# Patient Record
Sex: FEMALE | Race: WHITE | Employment: UNEMPLOYED | URBAN - METROPOLITAN AREA
[De-identification: names, ages, dates, MRNs, and addresses within clinical notes are randomized per-mention and may not be internally consistent; named-entity substitution may affect disease eponyms.]

---

## 2017-01-05 ENCOUNTER — TELEPHONE (OUTPATIENT)
Dept: INTERNAL MEDICINE CLINIC | Age: 57
End: 2017-01-05

## 2017-01-05 DIAGNOSIS — E79.0 HYPERURICEMIA: ICD-10-CM

## 2017-01-05 DIAGNOSIS — Z79.899 ENCOUNTER FOR LONG-TERM (CURRENT) USE OF MEDICATIONS: ICD-10-CM

## 2017-01-05 DIAGNOSIS — I10 ESSENTIAL HYPERTENSION: ICD-10-CM

## 2017-01-05 DIAGNOSIS — Z51.81 ENCOUNTER FOR THERAPEUTIC DRUG MONITORING: ICD-10-CM

## 2017-01-05 DIAGNOSIS — E78.2 MIXED HYPERLIPIDEMIA: Primary | ICD-10-CM

## 2017-01-12 ENCOUNTER — HOSPITAL ENCOUNTER (OUTPATIENT)
Dept: GENERAL RADIOLOGY | Age: 57
Discharge: HOME | End: 2017-01-12
Attending: INTERNAL MEDICINE | Admitting: INTERNAL MEDICINE

## 2017-01-12 LAB
ALBUMIN SERPL-MCNC: 4.8 GM/DL (ref 3.4–5)
ALP BLD-CCNC: 66 IU/L (ref 40–129)
ALT SERPL-CCNC: 26 U/L (ref 10–40)
ANION GAP SERPL CALCULATED.3IONS-SCNC: 14 MMOL/L (ref 4–16)
AST SERPL-CCNC: 22 IU/L (ref 15–37)
BILIRUB SERPL-MCNC: 0.5 MG/DL (ref 0–1)
BILIRUBIN DIRECT: 0.2 MG/DL (ref 0–0.3)
BILIRUBIN, INDIRECT: 0.3 MG/DL (ref 0–0.7)
BUN BLDV-MCNC: 12 MG/DL (ref 6–23)
CALCIUM SERPL-MCNC: 9.8 MG/DL (ref 8.3–10.6)
CHLORIDE BLD-SCNC: 104 MMOL/L (ref 99–110)
CHOLESTEROL: 194 MG/DL
CO2: 26 MMOL/L (ref 21–32)
CREAT SERPL-MCNC: 0.7 MG/DL (ref 0.6–1.1)
GFR AFRICAN AMERICAN: >60 ML/MIN/1.73M2
GFR NON-AFRICAN AMERICAN: >60 ML/MIN/1.73M2
GLUCOSE BLD-MCNC: 106 MG/DL (ref 70–140)
HDLC SERPL-MCNC: 38 MG/DL
LDL CHOLESTEROL CALCULATED: 87 MG/DL
POTASSIUM SERPL-SCNC: 4.4 MMOL/L (ref 3.5–5.1)
SODIUM BLD-SCNC: 144 MMOL/L (ref 135–145)
TOTAL CK: 74 IU/L (ref 26–140)
TOTAL PROTEIN: 7.2 GM/DL (ref 6.4–8.2)
TRIGL SERPL-MCNC: 347 MG/DL
URIC ACID: 6.1 MG/DL (ref 2.6–6)

## 2017-01-19 ENCOUNTER — OFFICE VISIT (OUTPATIENT)
Dept: INTERNAL MEDICINE CLINIC | Age: 57
End: 2017-01-19

## 2017-01-19 VITALS
WEIGHT: 164.2 LBS | DIASTOLIC BLOOD PRESSURE: 80 MMHG | SYSTOLIC BLOOD PRESSURE: 130 MMHG | HEART RATE: 68 BPM | BODY MASS INDEX: 29.79 KG/M2 | RESPIRATION RATE: 14 BRPM

## 2017-01-19 DIAGNOSIS — I10 ESSENTIAL HYPERTENSION: ICD-10-CM

## 2017-01-19 DIAGNOSIS — E79.0 HYPERURICEMIA: ICD-10-CM

## 2017-01-19 DIAGNOSIS — E78.2 MIXED HYPERLIPIDEMIA: ICD-10-CM

## 2017-01-19 DIAGNOSIS — R10.13 EPIGASTRIC PAIN: Primary | ICD-10-CM

## 2017-01-19 PROCEDURE — 99213 OFFICE O/P EST LOW 20 MIN: CPT | Performed by: INTERNAL MEDICINE

## 2017-02-02 ENCOUNTER — TELEPHONE (OUTPATIENT)
Dept: INTERNAL MEDICINE CLINIC | Age: 57
End: 2017-02-02

## 2017-02-02 DIAGNOSIS — M62.838 MUSCLE SPASMS OF NECK: Primary | ICD-10-CM

## 2017-02-03 ENCOUNTER — TELEPHONE (OUTPATIENT)
Dept: INTERNAL MEDICINE CLINIC | Age: 57
End: 2017-02-03

## 2017-02-03 ENCOUNTER — HOSPITAL ENCOUNTER (OUTPATIENT)
Dept: PHYSICAL THERAPY | Age: 57
Discharge: HOME | End: 2017-02-28
Attending: INTERNAL MEDICINE | Admitting: INTERNAL MEDICINE

## 2017-02-03 RX ORDER — TIZANIDINE HYDROCHLORIDE 2 MG/1
2 CAPSULE, GELATIN COATED ORAL 2 TIMES DAILY PRN
Qty: 30 CAPSULE | Refills: 1 | Status: SHIPPED | OUTPATIENT
Start: 2017-02-03 | End: 2017-08-29

## 2017-02-03 ASSESSMENT — PAIN DESCRIPTION - LOCATION: LOCATION: ARM;SHOULDER;NECK

## 2017-02-03 ASSESSMENT — PAIN DESCRIPTION - PROGRESSION: CLINICAL_PROGRESSION: GRADUALLY WORSENING

## 2017-02-03 ASSESSMENT — PAIN DESCRIPTION - PAIN TYPE: TYPE: ACUTE PAIN

## 2017-02-03 ASSESSMENT — PAIN DESCRIPTION - ONSET: ONSET: SUDDEN

## 2017-02-03 ASSESSMENT — PAIN DESCRIPTION - DESCRIPTORS: DESCRIPTORS: ACHING;SHARP

## 2017-02-03 ASSESSMENT — PAIN DESCRIPTION - FREQUENCY: FREQUENCY: CONTINUOUS

## 2017-02-03 ASSESSMENT — PAIN SCALES - GENERAL: PAINLEVEL_OUTOF10: 3

## 2017-02-03 ASSESSMENT — PAIN DESCRIPTION - ORIENTATION: ORIENTATION: LEFT

## 2017-02-06 ENCOUNTER — HOSPITAL ENCOUNTER (OUTPATIENT)
Dept: PHYSICAL THERAPY | Age: 57
Discharge: HOME | End: 2017-02-06
Admitting: INTERNAL MEDICINE

## 2017-02-09 ENCOUNTER — HOSPITAL ENCOUNTER (OUTPATIENT)
Dept: PHYSICAL THERAPY | Age: 57
Discharge: HOME | End: 2017-02-09
Admitting: INTERNAL MEDICINE

## 2017-02-13 ENCOUNTER — HOSPITAL ENCOUNTER (OUTPATIENT)
Dept: GENERAL RADIOLOGY | Age: 57
Discharge: HOME | End: 2017-02-13
Attending: INTERNAL MEDICINE | Admitting: INTERNAL MEDICINE

## 2017-02-13 ENCOUNTER — TELEPHONE (OUTPATIENT)
Dept: INTERNAL MEDICINE CLINIC | Age: 57
End: 2017-02-13

## 2017-02-13 DIAGNOSIS — M54.2 NECK PAIN: ICD-10-CM

## 2017-02-13 DIAGNOSIS — M54.2 NECK PAIN: Primary | ICD-10-CM

## 2017-02-14 ENCOUNTER — TELEPHONE (OUTPATIENT)
Dept: INTERNAL MEDICINE CLINIC | Age: 57
End: 2017-02-14

## 2017-02-14 DIAGNOSIS — M50.30 DDD (DEGENERATIVE DISC DISEASE), CERVICAL: Primary | ICD-10-CM

## 2017-02-18 ENCOUNTER — HOSPITAL ENCOUNTER (OUTPATIENT)
Dept: PHYSICAL THERAPY | Age: 57
Discharge: HOME | End: 2017-02-18
Admitting: INTERNAL MEDICINE

## 2017-03-01 ENCOUNTER — HOSPITAL ENCOUNTER (OUTPATIENT)
Dept: OTHER | Age: 57
Discharge: HOME | End: 2017-03-31
Attending: INTERNAL MEDICINE | Admitting: INTERNAL MEDICINE

## 2017-03-02 ENCOUNTER — HOSPITAL ENCOUNTER (OUTPATIENT)
Dept: PHYSICAL THERAPY | Age: 57
Discharge: HOME | End: 2017-03-02
Admitting: INTERNAL MEDICINE

## 2017-03-16 ENCOUNTER — HOSPITAL ENCOUNTER (OUTPATIENT)
Dept: SURGERY | Age: 57
Discharge: HOME | End: 2017-03-16
Attending: INTERNAL MEDICINE | Admitting: INTERNAL MEDICINE

## 2017-03-16 VITALS
SYSTOLIC BLOOD PRESSURE: 144 MMHG | WEIGHT: 167 LBS | DIASTOLIC BLOOD PRESSURE: 85 MMHG | RESPIRATION RATE: 16 BRPM | HEIGHT: 62 IN | BODY MASS INDEX: 30.73 KG/M2 | TEMPERATURE: 98.7 F | HEART RATE: 66 BPM | OXYGEN SATURATION: 98 %

## 2017-03-16 RX ORDER — SODIUM CHLORIDE, SODIUM LACTATE, POTASSIUM CHLORIDE, CALCIUM CHLORIDE 600; 310; 30; 20 MG/100ML; MG/100ML; MG/100ML; MG/100ML
INJECTION, SOLUTION INTRAVENOUS CONTINUOUS
Status: DISCONTINUED | OUTPATIENT
Start: 2017-03-16 | End: 2017-03-17 | Stop reason: HOSPADM

## 2017-03-16 RX ORDER — ONDANSETRON 2 MG/ML
4 INJECTION INTRAMUSCULAR; INTRAVENOUS ONCE
Status: DISCONTINUED | OUTPATIENT
Start: 2017-03-16 | End: 2017-03-17 | Stop reason: HOSPADM

## 2017-03-16 RX ADMIN — SODIUM CHLORIDE, SODIUM LACTATE, POTASSIUM CHLORIDE, CALCIUM CHLORIDE: 600; 310; 30; 20 INJECTION, SOLUTION INTRAVENOUS at 08:54

## 2017-03-16 ASSESSMENT — PAIN - FUNCTIONAL ASSESSMENT: PAIN_FUNCTIONAL_ASSESSMENT: 0-10

## 2017-03-16 ASSESSMENT — PAIN SCALES - GENERAL
PAINLEVEL_OUTOF10: 0
PAINLEVEL_OUTOF10: 0

## 2017-03-20 ENCOUNTER — HOSPITAL ENCOUNTER (OUTPATIENT)
Dept: PHYSICAL THERAPY | Age: 57
Discharge: HOME | End: 2017-03-20
Admitting: INTERNAL MEDICINE

## 2017-04-01 ENCOUNTER — HOSPITAL ENCOUNTER (OUTPATIENT)
Dept: OTHER | Age: 57
Discharge: HOME | End: 2017-04-24
Attending: INTERNAL MEDICINE | Admitting: INTERNAL MEDICINE

## 2017-04-03 ENCOUNTER — TELEPHONE (OUTPATIENT)
Dept: INTERNAL MEDICINE CLINIC | Age: 57
End: 2017-04-03

## 2017-04-04 ENCOUNTER — OFFICE VISIT (OUTPATIENT)
Dept: INTERNAL MEDICINE CLINIC | Age: 57
End: 2017-04-04

## 2017-04-04 VITALS
WEIGHT: 165.4 LBS | SYSTOLIC BLOOD PRESSURE: 122 MMHG | HEART RATE: 64 BPM | HEIGHT: 63 IN | DIASTOLIC BLOOD PRESSURE: 70 MMHG | RESPIRATION RATE: 12 BRPM | BODY MASS INDEX: 29.3 KG/M2

## 2017-04-04 DIAGNOSIS — E55.9 VITAMIN D DEFICIENCY: ICD-10-CM

## 2017-04-04 DIAGNOSIS — M25.512 CHRONIC LEFT SHOULDER PAIN: ICD-10-CM

## 2017-04-04 DIAGNOSIS — E79.0 HYPERURICEMIA: ICD-10-CM

## 2017-04-04 DIAGNOSIS — R00.2 PALPITATIONS: ICD-10-CM

## 2017-04-04 DIAGNOSIS — G89.29 CHRONIC LEFT SHOULDER PAIN: ICD-10-CM

## 2017-04-04 DIAGNOSIS — Z00.00 ANNUAL PHYSICAL EXAM: Primary | ICD-10-CM

## 2017-04-04 LAB
HEMOCCULT STL QL: NORMAL

## 2017-04-04 PROCEDURE — 81001 URINALYSIS AUTO W/SCOPE: CPT | Performed by: INTERNAL MEDICINE

## 2017-04-04 PROCEDURE — 36415 COLL VENOUS BLD VENIPUNCTURE: CPT | Performed by: INTERNAL MEDICINE

## 2017-04-04 PROCEDURE — 93000 ELECTROCARDIOGRAM COMPLETE: CPT | Performed by: INTERNAL MEDICINE

## 2017-04-04 PROCEDURE — 99396 PREV VISIT EST AGE 40-64: CPT | Performed by: INTERNAL MEDICINE

## 2017-04-04 RX ORDER — HYDROCODONE BITARTRATE AND ACETAMINOPHEN 5; 325 MG/1; MG/1
1 TABLET ORAL 2 TIMES DAILY PRN
Qty: 60 TABLET | Refills: 0 | Status: SHIPPED | OUTPATIENT
Start: 2017-04-04 | End: 2017-11-30 | Stop reason: SDUPTHER

## 2017-04-04 RX ORDER — DILTIAZEM HYDROCHLORIDE 180 MG/1
180 CAPSULE, COATED, EXTENDED RELEASE ORAL DAILY
Qty: 90 CAPSULE | Refills: 3 | Status: SHIPPED | OUTPATIENT
Start: 2017-04-04 | End: 2018-04-18 | Stop reason: SDUPTHER

## 2017-04-05 LAB
A/G RATIO: 1.9 (ref 1.1–2.2)
ALBUMIN SERPL-MCNC: 4.6 G/DL (ref 3.4–5)
ALP BLD-CCNC: 75 U/L (ref 40–129)
ALT SERPL-CCNC: 25 U/L (ref 10–40)
ANION GAP SERPL CALCULATED.3IONS-SCNC: 18 MMOL/L (ref 3–16)
AST SERPL-CCNC: 19 U/L (ref 15–37)
BASOPHILS ABSOLUTE: 0 K/UL (ref 0–0.2)
BASOPHILS RELATIVE PERCENT: 0.3 %
BILIRUB SERPL-MCNC: 0.5 MG/DL (ref 0–1)
BILIRUBIN URINE: NEGATIVE
BLOOD, URINE: NEGATIVE
BUN BLDV-MCNC: 10 MG/DL (ref 7–20)
CALCIUM SERPL-MCNC: 9.8 MG/DL (ref 8.3–10.6)
CHLORIDE BLD-SCNC: 104 MMOL/L (ref 99–110)
CHOLESTEROL, TOTAL: 203 MG/DL (ref 0–199)
CLARITY: ABNORMAL
CO2: 23 MMOL/L (ref 21–32)
COLOR: YELLOW
CREAT SERPL-MCNC: 0.6 MG/DL (ref 0.6–1.1)
EOSINOPHILS ABSOLUTE: 0.1 K/UL (ref 0–0.6)
EOSINOPHILS RELATIVE PERCENT: 1.6 %
EPITHELIAL CELLS, UA: 1 /HPF (ref 0–5)
ESTIMATED AVERAGE GLUCOSE: 111.2 MG/DL
GFR AFRICAN AMERICAN: >60
GFR NON-AFRICAN AMERICAN: >60
GLOBULIN: 2.4 G/DL
GLUCOSE BLD-MCNC: 106 MG/DL (ref 70–99)
GLUCOSE URINE: NEGATIVE MG/DL
HBA1C MFR BLD: 5.5 %
HCT VFR BLD CALC: 44.1 % (ref 36–48)
HDLC SERPL-MCNC: 36 MG/DL (ref 40–60)
HEMOGLOBIN: 14.5 G/DL (ref 12–16)
HYALINE CASTS: 0 /HPF (ref 0–8)
KETONES, URINE: NEGATIVE MG/DL
LDL CHOLESTEROL CALCULATED: ABNORMAL MG/DL
LDL CHOLESTEROL DIRECT: 103 MG/DL
LEUKOCYTE ESTERASE, URINE: NEGATIVE
LYMPHOCYTES ABSOLUTE: 2.8 K/UL (ref 1–5.1)
LYMPHOCYTES RELATIVE PERCENT: 34.7 %
MCH RBC QN AUTO: 28 PG (ref 26–34)
MCHC RBC AUTO-ENTMCNC: 32.9 G/DL (ref 31–36)
MCV RBC AUTO: 85.2 FL (ref 80–100)
MICROSCOPIC EXAMINATION: YES
MONOCYTES ABSOLUTE: 0.6 K/UL (ref 0–1.3)
MONOCYTES RELATIVE PERCENT: 7.7 %
NEUTROPHILS ABSOLUTE: 4.5 K/UL (ref 1.7–7.7)
NEUTROPHILS RELATIVE PERCENT: 55.7 %
NITRITE, URINE: NEGATIVE
PDW BLD-RTO: 14.1 % (ref 12.4–15.4)
PH UA: 7.5
PLATELET # BLD: 264 K/UL (ref 135–450)
PMV BLD AUTO: 9.2 FL (ref 5–10.5)
POTASSIUM SERPL-SCNC: 4.1 MMOL/L (ref 3.5–5.1)
PROTEIN UA: ABNORMAL MG/DL
RBC # BLD: 5.18 M/UL (ref 4–5.2)
RBC UA: 2 /HPF (ref 0–4)
SODIUM BLD-SCNC: 145 MMOL/L (ref 136–145)
SPECIFIC GRAVITY UA: 1.02
TOTAL CK: 43 U/L (ref 26–192)
TOTAL PROTEIN: 7 G/DL (ref 6.4–8.2)
TRIGL SERPL-MCNC: 444 MG/DL (ref 0–150)
TSH SERPL DL<=0.05 MIU/L-ACNC: 1.02 UIU/ML (ref 0.27–4.2)
URIC ACID, SERUM: 8.1 MG/DL (ref 2.6–6)
UROBILINOGEN, URINE: 0.2 E.U./DL
VITAMIN D 25-HYDROXY: 28.9 NG/ML
VLDLC SERPL CALC-MCNC: ABNORMAL MG/DL
WBC # BLD: 8.1 K/UL (ref 4–11)
WBC UA: 2 /HPF (ref 0–5)

## 2017-04-06 ENCOUNTER — HOSPITAL ENCOUNTER (OUTPATIENT)
Dept: GENERAL RADIOLOGY | Age: 57
Discharge: HOME | End: 2017-04-06
Attending: INTERNAL MEDICINE | Admitting: INTERNAL MEDICINE

## 2017-04-06 DIAGNOSIS — M25.512 CHRONIC LEFT SHOULDER PAIN: ICD-10-CM

## 2017-04-06 DIAGNOSIS — G89.29 CHRONIC LEFT SHOULDER PAIN: ICD-10-CM

## 2017-04-17 ENCOUNTER — TELEPHONE (OUTPATIENT)
Dept: INTERNAL MEDICINE CLINIC | Age: 57
End: 2017-04-17

## 2017-04-18 ENCOUNTER — OFFICE VISIT (OUTPATIENT)
Dept: INTERNAL MEDICINE CLINIC | Age: 57
End: 2017-04-18

## 2017-04-18 DIAGNOSIS — E78.2 MIXED HYPERLIPIDEMIA: ICD-10-CM

## 2017-04-18 DIAGNOSIS — M79.7 FIBROMYALGIA: ICD-10-CM

## 2017-04-18 DIAGNOSIS — E79.0 HYPERURICEMIA: Primary | ICD-10-CM

## 2017-04-18 DIAGNOSIS — F41.1 GENERALIZED ANXIETY DISORDER: ICD-10-CM

## 2017-04-18 DIAGNOSIS — I10 ESSENTIAL HYPERTENSION: ICD-10-CM

## 2017-04-18 PROCEDURE — 99213 OFFICE O/P EST LOW 20 MIN: CPT | Performed by: INTERNAL MEDICINE

## 2017-05-03 VITALS — HEART RATE: 72 BPM | DIASTOLIC BLOOD PRESSURE: 80 MMHG | SYSTOLIC BLOOD PRESSURE: 130 MMHG

## 2017-05-23 ENCOUNTER — OFFICE VISIT (OUTPATIENT)
Dept: FAMILY MEDICINE CLINIC | Age: 57
End: 2017-05-23

## 2017-05-23 ENCOUNTER — TELEPHONE (OUTPATIENT)
Dept: FAMILY MEDICINE CLINIC | Age: 57
End: 2017-05-23

## 2017-05-23 VITALS
HEART RATE: 85 BPM | WEIGHT: 169.6 LBS | SYSTOLIC BLOOD PRESSURE: 138 MMHG | BODY MASS INDEX: 31.21 KG/M2 | HEIGHT: 62 IN | OXYGEN SATURATION: 98 % | TEMPERATURE: 97.9 F | DIASTOLIC BLOOD PRESSURE: 86 MMHG

## 2017-05-23 DIAGNOSIS — J01.00 ACUTE MAXILLARY SINUSITIS, RECURRENCE NOT SPECIFIED: Primary | ICD-10-CM

## 2017-05-23 DIAGNOSIS — H00.014 HORDEOLUM EXTERNUM OF LEFT UPPER EYELID: ICD-10-CM

## 2017-05-23 DIAGNOSIS — H10.32 ACUTE BACTERIAL CONJUNCTIVITIS OF LEFT EYE: ICD-10-CM

## 2017-05-23 PROCEDURE — 99214 OFFICE O/P EST MOD 30 MIN: CPT | Performed by: FAMILY MEDICINE

## 2017-05-23 RX ORDER — AMOXICILLIN AND CLAVULANATE POTASSIUM 500; 125 MG/1; MG/1
1 TABLET, FILM COATED ORAL 2 TIMES DAILY
Qty: 14 TABLET | Refills: 0 | Status: SHIPPED | OUTPATIENT
Start: 2017-05-23 | End: 2017-05-30

## 2017-05-23 RX ORDER — FLUCONAZOLE 150 MG/1
TABLET ORAL
Qty: 2 TABLET | Refills: 0 | Status: SHIPPED | OUTPATIENT
Start: 2017-05-23 | End: 2017-08-29 | Stop reason: ALTCHOICE

## 2017-05-23 RX ORDER — MOXIFLOXACIN 5 MG/ML
1 SOLUTION/ DROPS OPHTHALMIC 3 TIMES DAILY
Qty: 3 ML | Refills: 0 | Status: SHIPPED | OUTPATIENT
Start: 2017-05-23 | End: 2017-05-30

## 2017-05-23 ASSESSMENT — ENCOUNTER SYMPTOMS
SORE THROAT: 0
SHORTNESS OF BREATH: 0
NAUSEA: 0
SINUS PRESSURE: 0
EYE REDNESS: 1
COUGH: 0
DOUBLE VISION: 0
FOREIGN BODY SENSATION: 1
EYE ITCHING: 1
DIARRHEA: 0
BLURRED VISION: 1
BACK PAIN: 0
VOMITING: 0
EYE DISCHARGE: 1

## 2017-05-23 ASSESSMENT — PATIENT HEALTH QUESTIONNAIRE - PHQ9
2. FEELING DOWN, DEPRESSED OR HOPELESS: 0
SUM OF ALL RESPONSES TO PHQ QUESTIONS 1-9: 0
1. LITTLE INTEREST OR PLEASURE IN DOING THINGS: 0
SUM OF ALL RESPONSES TO PHQ9 QUESTIONS 1 & 2: 0

## 2017-05-23 ASSESSMENT — VISUAL ACUITY
OD_CC: 20/25
OS_CC: 20/25

## 2017-05-25 ENCOUNTER — TELEPHONE (OUTPATIENT)
Dept: FAMILY MEDICINE CLINIC | Age: 57
End: 2017-05-25

## 2017-07-06 ENCOUNTER — TELEPHONE (OUTPATIENT)
Dept: INTERNAL MEDICINE CLINIC | Age: 57
End: 2017-07-06

## 2017-07-06 DIAGNOSIS — E78.2 MIXED HYPERLIPIDEMIA: Primary | ICD-10-CM

## 2017-07-06 DIAGNOSIS — I10 ESSENTIAL HYPERTENSION: ICD-10-CM

## 2017-07-06 DIAGNOSIS — E79.0 HYPERURICEMIA: ICD-10-CM

## 2017-07-06 DIAGNOSIS — R73.02 GLUCOSE INTOLERANCE (IMPAIRED GLUCOSE TOLERANCE): ICD-10-CM

## 2017-07-06 DIAGNOSIS — Z79.899 ENCOUNTER FOR LONG-TERM (CURRENT) USE OF MEDICATIONS: ICD-10-CM

## 2017-07-06 DIAGNOSIS — Z51.81 ENCOUNTER FOR THERAPEUTIC DRUG MONITORING: ICD-10-CM

## 2017-07-10 ENCOUNTER — NURSE ONLY (OUTPATIENT)
Dept: INTERNAL MEDICINE CLINIC | Age: 57
End: 2017-07-10

## 2017-07-10 DIAGNOSIS — E78.2 MIXED HYPERLIPIDEMIA: ICD-10-CM

## 2017-07-10 DIAGNOSIS — I10 ESSENTIAL HYPERTENSION: ICD-10-CM

## 2017-07-10 LAB
ALBUMIN SERPL-MCNC: 4.8 G/DL (ref 3.4–5)
ALP BLD-CCNC: 74 U/L (ref 40–129)
ALT SERPL-CCNC: 27 U/L (ref 10–40)
ANION GAP SERPL CALCULATED.3IONS-SCNC: 19 MMOL/L (ref 3–16)
AST SERPL-CCNC: 22 U/L (ref 15–37)
BILIRUB SERPL-MCNC: 0.5 MG/DL (ref 0–1)
BILIRUBIN DIRECT: <0.2 MG/DL (ref 0–0.3)
BILIRUBIN, INDIRECT: NORMAL MG/DL (ref 0–1)
BUN BLDV-MCNC: 12 MG/DL (ref 7–20)
CALCIUM SERPL-MCNC: 9.9 MG/DL (ref 8.3–10.6)
CHLORIDE BLD-SCNC: 103 MMOL/L (ref 99–110)
CHOLESTEROL, TOTAL: 193 MG/DL (ref 0–199)
CO2: 22 MMOL/L (ref 21–32)
CREAT SERPL-MCNC: 0.6 MG/DL (ref 0.6–1.1)
GFR AFRICAN AMERICAN: >60
GFR NON-AFRICAN AMERICAN: >60
GLUCOSE BLD-MCNC: 89 MG/DL (ref 70–99)
HDLC SERPL-MCNC: 34 MG/DL (ref 40–60)
LDL CHOLESTEROL CALCULATED: ABNORMAL MG/DL
LDL CHOLESTEROL DIRECT: 102 MG/DL
POTASSIUM SERPL-SCNC: 4.2 MMOL/L (ref 3.5–5.1)
SODIUM BLD-SCNC: 144 MMOL/L (ref 136–145)
TOTAL PROTEIN: 7.7 G/DL (ref 6.4–8.2)
TRIGL SERPL-MCNC: 422 MG/DL (ref 0–150)
URIC ACID, SERUM: 7.4 MG/DL (ref 2.6–6)
VLDLC SERPL CALC-MCNC: ABNORMAL MG/DL

## 2017-07-10 PROCEDURE — 36415 COLL VENOUS BLD VENIPUNCTURE: CPT | Performed by: INTERNAL MEDICINE

## 2017-07-11 LAB
ESTIMATED AVERAGE GLUCOSE: 116.9 MG/DL
HBA1C MFR BLD: 5.7 %

## 2017-07-17 ENCOUNTER — TELEPHONE (OUTPATIENT)
Dept: INTERNAL MEDICINE CLINIC | Age: 57
End: 2017-07-17

## 2017-07-18 ENCOUNTER — OFFICE VISIT (OUTPATIENT)
Dept: INTERNAL MEDICINE CLINIC | Age: 57
End: 2017-07-18

## 2017-07-18 VITALS
DIASTOLIC BLOOD PRESSURE: 74 MMHG | BODY MASS INDEX: 31.09 KG/M2 | HEART RATE: 68 BPM | RESPIRATION RATE: 12 BRPM | SYSTOLIC BLOOD PRESSURE: 130 MMHG | WEIGHT: 170 LBS

## 2017-07-18 DIAGNOSIS — E78.2 MIXED HYPERLIPIDEMIA: ICD-10-CM

## 2017-07-18 DIAGNOSIS — R73.02 GLUCOSE INTOLERANCE (IMPAIRED GLUCOSE TOLERANCE): ICD-10-CM

## 2017-07-18 DIAGNOSIS — E79.0 HYPERURICEMIA: ICD-10-CM

## 2017-07-18 DIAGNOSIS — I10 ESSENTIAL HYPERTENSION: Primary | ICD-10-CM

## 2017-07-18 PROCEDURE — 99213 OFFICE O/P EST LOW 20 MIN: CPT | Performed by: INTERNAL MEDICINE

## 2017-08-29 ENCOUNTER — OFFICE VISIT (OUTPATIENT)
Dept: FAMILY MEDICINE CLINIC | Age: 57
End: 2017-08-29

## 2017-08-29 VITALS
SYSTOLIC BLOOD PRESSURE: 144 MMHG | HEART RATE: 92 BPM | HEIGHT: 62 IN | WEIGHT: 171.2 LBS | TEMPERATURE: 98.3 F | BODY MASS INDEX: 31.5 KG/M2 | OXYGEN SATURATION: 98 % | DIASTOLIC BLOOD PRESSURE: 96 MMHG

## 2017-08-29 DIAGNOSIS — J01.01 ACUTE RECURRENT MAXILLARY SINUSITIS: Primary | ICD-10-CM

## 2017-08-29 DIAGNOSIS — I10 ESSENTIAL HYPERTENSION: ICD-10-CM

## 2017-08-29 PROCEDURE — 99213 OFFICE O/P EST LOW 20 MIN: CPT | Performed by: FAMILY MEDICINE

## 2017-08-29 RX ORDER — ALPRAZOLAM 0.25 MG/1
0.25 TABLET ORAL PRN
COMMUNITY
End: 2019-04-23 | Stop reason: CLARIF

## 2017-08-29 RX ORDER — AZITHROMYCIN 250 MG/1
TABLET, FILM COATED ORAL
Qty: 1 PACKET | Refills: 0 | Status: SHIPPED | OUTPATIENT
Start: 2017-08-29 | End: 2017-09-15

## 2017-08-29 RX ORDER — ASPIRIN 325 MG
325 TABLET ORAL PRN
COMMUNITY
End: 2018-04-18 | Stop reason: CLARIF

## 2017-08-29 ASSESSMENT — ENCOUNTER SYMPTOMS
VOMITING: 0
COUGH: 0
BACK PAIN: 0
SINUS PRESSURE: 0
DIARRHEA: 0
NAUSEA: 0
SHORTNESS OF BREATH: 0
SORE THROAT: 0
EYE DISCHARGE: 0
ABDOMINAL PAIN: 0
BLOOD IN STOOL: 0

## 2017-08-31 ENCOUNTER — TELEPHONE (OUTPATIENT)
Dept: FAMILY MEDICINE CLINIC | Age: 57
End: 2017-08-31

## 2017-08-31 ENCOUNTER — OFFICE VISIT (OUTPATIENT)
Dept: INTERNAL MEDICINE CLINIC | Age: 57
End: 2017-08-31

## 2017-08-31 VITALS
BODY MASS INDEX: 30.99 KG/M2 | HEART RATE: 88 BPM | DIASTOLIC BLOOD PRESSURE: 82 MMHG | WEIGHT: 170.8 LBS | RESPIRATION RATE: 16 BRPM | SYSTOLIC BLOOD PRESSURE: 142 MMHG | TEMPERATURE: 98.3 F

## 2017-08-31 DIAGNOSIS — H66.001 ACUTE SUPPURATIVE OTITIS MEDIA OF RIGHT EAR WITHOUT SPONTANEOUS RUPTURE OF TYMPANIC MEMBRANE, RECURRENCE NOT SPECIFIED: Primary | ICD-10-CM

## 2017-08-31 DIAGNOSIS — R59.0 LYMPHADENOPATHY OF RIGHT CERVICAL REGION: ICD-10-CM

## 2017-08-31 DIAGNOSIS — B00.9 HERPETIC LESION: ICD-10-CM

## 2017-08-31 PROCEDURE — 99213 OFFICE O/P EST LOW 20 MIN: CPT | Performed by: INTERNAL MEDICINE

## 2017-08-31 RX ORDER — AMOXICILLIN AND CLAVULANATE POTASSIUM 875; 125 MG/1; MG/1
1 TABLET, FILM COATED ORAL 2 TIMES DAILY
Qty: 20 TABLET | Refills: 0 | Status: SHIPPED | OUTPATIENT
Start: 2017-08-31 | End: 2017-09-10

## 2017-08-31 RX ORDER — FAMCICLOVIR 500 MG/1
TABLET, FILM COATED ORAL
Qty: 6 TABLET | Refills: 2 | Status: SHIPPED | OUTPATIENT
Start: 2017-08-31 | End: 2019-12-17 | Stop reason: SDUPTHER

## 2017-08-31 RX ORDER — METHYLPREDNISOLONE 4 MG/1
TABLET ORAL
Qty: 1 KIT | Refills: 0 | Status: SHIPPED | OUTPATIENT
Start: 2017-08-31 | End: 2017-09-06

## 2017-08-31 RX ORDER — FLUCONAZOLE 150 MG/1
150 TABLET ORAL ONCE
Qty: 1 TABLET | Refills: 0 | Status: SHIPPED | OUTPATIENT
Start: 2017-08-31 | End: 2017-08-31

## 2017-09-15 ENCOUNTER — OFFICE VISIT (OUTPATIENT)
Dept: INTERNAL MEDICINE CLINIC | Age: 57
End: 2017-09-15

## 2017-09-15 VITALS
OXYGEN SATURATION: 96 % | DIASTOLIC BLOOD PRESSURE: 80 MMHG | RESPIRATION RATE: 14 BRPM | SYSTOLIC BLOOD PRESSURE: 130 MMHG | HEART RATE: 90 BPM

## 2017-09-15 DIAGNOSIS — H91.91 HEARING LOSS OF RIGHT EAR, UNSPECIFIED HEARING LOSS TYPE: ICD-10-CM

## 2017-09-15 DIAGNOSIS — I10 ESSENTIAL HYPERTENSION: ICD-10-CM

## 2017-09-15 DIAGNOSIS — J32.0 CHRONIC MAXILLARY SINUSITIS: ICD-10-CM

## 2017-09-15 DIAGNOSIS — H65.31 CHRONIC MUCOID OTITIS MEDIA OF RIGHT EAR: Primary | ICD-10-CM

## 2017-09-15 PROCEDURE — 99213 OFFICE O/P EST LOW 20 MIN: CPT | Performed by: INTERNAL MEDICINE

## 2017-09-25 ENCOUNTER — OFFICE VISIT (OUTPATIENT)
Dept: SURGERY | Age: 57
End: 2017-09-25

## 2017-09-25 VITALS — HEIGHT: 62 IN | BODY MASS INDEX: 30.55 KG/M2 | WEIGHT: 166 LBS

## 2017-09-25 DIAGNOSIS — L68.0 HIRSUTISM: Primary | ICD-10-CM

## 2017-09-25 PROCEDURE — 99999 PR OFFICE/OUTPT VISIT,PROCEDURE ONLY: CPT | Performed by: SURGERY

## 2017-09-25 ASSESSMENT — ENCOUNTER SYMPTOMS
CHOKING: 0
BACK PAIN: 0
ANAL BLEEDING: 0
STRIDOR: 0
CONSTIPATION: 0
PHOTOPHOBIA: 0
APNEA: 0
EYE ITCHING: 0
RECTAL PAIN: 0
SORE THROAT: 0
COLOR CHANGE: 0
EYE REDNESS: 0

## 2017-10-12 ENCOUNTER — PROCEDURE VISIT (OUTPATIENT)
Dept: SURGERY | Age: 57
End: 2017-10-12

## 2017-10-12 DIAGNOSIS — L68.0 HIRSUTISM: Primary | ICD-10-CM

## 2017-10-12 PROCEDURE — 99024 POSTOP FOLLOW-UP VISIT: CPT | Performed by: SURGERY

## 2017-10-26 ENCOUNTER — TELEPHONE (OUTPATIENT)
Dept: INTERNAL MEDICINE CLINIC | Age: 57
End: 2017-10-26

## 2017-10-26 DIAGNOSIS — R73.02 GLUCOSE INTOLERANCE (IMPAIRED GLUCOSE TOLERANCE): ICD-10-CM

## 2017-10-26 DIAGNOSIS — M1A.9XX1 CHRONIC GOUT WITH TOPHUS, UNSPECIFIED CAUSE, UNSPECIFIED SITE: Primary | ICD-10-CM

## 2017-10-26 DIAGNOSIS — I10 ESSENTIAL HYPERTENSION: ICD-10-CM

## 2017-10-26 DIAGNOSIS — E78.2 MIXED HYPERLIPIDEMIA: ICD-10-CM

## 2017-10-26 DIAGNOSIS — E79.0 HYPERURICEMIA: ICD-10-CM

## 2017-11-01 ENCOUNTER — NURSE ONLY (OUTPATIENT)
Dept: INTERNAL MEDICINE CLINIC | Age: 57
End: 2017-11-01

## 2017-11-01 DIAGNOSIS — Z23 NEED FOR INFLUENZA VACCINATION: Primary | ICD-10-CM

## 2017-11-01 PROCEDURE — 90686 IIV4 VACC NO PRSV 0.5 ML IM: CPT | Performed by: INTERNAL MEDICINE

## 2017-11-01 PROCEDURE — 90471 IMMUNIZATION ADMIN: CPT | Performed by: INTERNAL MEDICINE

## 2017-11-01 NOTE — PROGRESS NOTES
Vaccine Information Sheet, \"Influenza - Inactivated\"  given to Yonis Sanchez, or parent/legal guardian of  Yonis Sanchez and verbalized understanding. Patient responses:    Have you ever had a reaction to a flu vaccine? NO  Are you able to eat eggs without adverse effects? YES  Do you have any current illness? NO  Have you ever had Guillian Joffre Syndrome? NO    Flu vaccine given per order. Please see immunization tab.

## 2017-11-03 LAB
ANION GAP SERPL CALCULATED.3IONS-SCNC: 17 MMOL/L (ref 3–16)
BASOPHILS ABSOLUTE: 0 K/UL (ref 0–0.2)
BASOPHILS RELATIVE PERCENT: 0.3 %
BUN BLDV-MCNC: 9 MG/DL (ref 7–20)
CALCIUM SERPL-MCNC: 9.8 MG/DL (ref 8.3–10.6)
CHLORIDE BLD-SCNC: 101 MMOL/L (ref 99–110)
CHOLESTEROL, TOTAL: 179 MG/DL (ref 0–199)
CO2: 25 MMOL/L (ref 21–32)
CREAT SERPL-MCNC: 0.6 MG/DL (ref 0.6–1.1)
EOSINOPHILS ABSOLUTE: 0.1 K/UL (ref 0–0.6)
EOSINOPHILS RELATIVE PERCENT: 1.8 %
GFR AFRICAN AMERICAN: >60
GFR NON-AFRICAN AMERICAN: >60
GLUCOSE BLD-MCNC: 81 MG/DL (ref 70–99)
HCT VFR BLD CALC: 42.4 % (ref 36–48)
HDLC SERPL-MCNC: 31 MG/DL (ref 40–60)
HEMOGLOBIN: 14.1 G/DL (ref 12–16)
LDL CHOLESTEROL CALCULATED: ABNORMAL MG/DL
LDL CHOLESTEROL DIRECT: 83 MG/DL
LYMPHOCYTES ABSOLUTE: 2.5 K/UL (ref 1–5.1)
LYMPHOCYTES RELATIVE PERCENT: 33.7 %
MCH RBC QN AUTO: 28.3 PG (ref 26–34)
MCHC RBC AUTO-ENTMCNC: 33.3 G/DL (ref 31–36)
MCV RBC AUTO: 84.9 FL (ref 80–100)
MONOCYTES ABSOLUTE: 0.6 K/UL (ref 0–1.3)
MONOCYTES RELATIVE PERCENT: 7.9 %
NEUTROPHILS ABSOLUTE: 4.2 K/UL (ref 1.7–7.7)
NEUTROPHILS RELATIVE PERCENT: 56.3 %
PDW BLD-RTO: 14.7 % (ref 12.4–15.4)
PLATELET # BLD: 277 K/UL (ref 135–450)
PMV BLD AUTO: 8.5 FL (ref 5–10.5)
POTASSIUM SERPL-SCNC: 4.3 MMOL/L (ref 3.5–5.1)
RBC # BLD: 5 M/UL (ref 4–5.2)
SODIUM BLD-SCNC: 143 MMOL/L (ref 136–145)
TRIGL SERPL-MCNC: 435 MG/DL (ref 0–150)
TSH SERPL DL<=0.05 MIU/L-ACNC: 1.96 UIU/ML (ref 0.27–4.2)
URIC ACID, SERUM: 6.4 MG/DL (ref 2.6–6)
VLDLC SERPL CALC-MCNC: ABNORMAL MG/DL
WBC # BLD: 7.5 K/UL (ref 4–11)

## 2017-11-04 LAB
ESTIMATED AVERAGE GLUCOSE: 116.9 MG/DL
HBA1C MFR BLD: 5.7 %

## 2017-11-09 ENCOUNTER — PROCEDURE VISIT (OUTPATIENT)
Dept: SURGERY | Age: 57
End: 2017-11-09

## 2017-11-09 VITALS — BODY MASS INDEX: 30.55 KG/M2 | HEIGHT: 62 IN | WEIGHT: 166.01 LBS

## 2017-11-09 DIAGNOSIS — L68.0 HIRSUTISM: Primary | ICD-10-CM

## 2017-11-09 PROCEDURE — 99999 PR OFFICE/OUTPT VISIT,PROCEDURE ONLY: CPT | Performed by: SURGERY

## 2017-11-30 NOTE — TELEPHONE ENCOUNTER
From: Marcos Villa  Sent: 11/30/2017 1:54 PM EST  Subject: Medication Renewal Request    Pola Boards A. Quinton Lesches would like a refill of the following medications:  omeprazole (PRILOSEC) 20 MG capsule [MARIA TERESA Jesus MD]  Icosapent Ethyl (VASCEPA) 1 G CAPS capsule [MARIA TERESA Jesus MD]  HYDROcodone-acetaminophen (NORCO) 5-325 MG per tablet [MARIA TERESA Jesus MD]    Preferred pharmacy: [address] [place] [address], [address], [place]. \[facility].\""e.  [facility] [phone] - F [phone]    Comment:

## 2017-12-01 RX ORDER — OMEPRAZOLE 20 MG/1
20 CAPSULE, DELAYED RELEASE ORAL DAILY
Qty: 90 CAPSULE | Refills: 3 | Status: SHIPPED | OUTPATIENT
Start: 2017-12-01 | End: 2019-12-09 | Stop reason: SDUPTHER

## 2017-12-01 RX ORDER — HYDROCODONE BITARTRATE AND ACETAMINOPHEN 5; 325 MG/1; MG/1
1 TABLET ORAL 2 TIMES DAILY PRN
Qty: 60 TABLET | Refills: 0 | Status: SHIPPED | OUTPATIENT
Start: 2017-12-01 | End: 2018-02-08 | Stop reason: SDUPTHER

## 2017-12-01 RX ORDER — ICOSAPENT ETHYL 1000 MG/1
1 CAPSULE ORAL DAILY
Qty: 30 CAPSULE | Refills: 5 | Status: SHIPPED | OUTPATIENT
Start: 2017-12-01 | End: 2018-04-18 | Stop reason: CLARIF

## 2017-12-07 ENCOUNTER — PROCEDURE VISIT (OUTPATIENT)
Dept: SURGERY | Age: 57
End: 2017-12-07

## 2017-12-07 VITALS — BODY MASS INDEX: 30.55 KG/M2 | HEIGHT: 62 IN | WEIGHT: 166.01 LBS

## 2017-12-07 DIAGNOSIS — L68.0 HIRSUTISM: Primary | ICD-10-CM

## 2017-12-07 PROCEDURE — 99999 PR OFFICE/OUTPT VISIT,PROCEDURE ONLY: CPT | Performed by: SURGERY

## 2017-12-07 ASSESSMENT — ENCOUNTER SYMPTOMS
EYE ITCHING: 0
PHOTOPHOBIA: 0
BACK PAIN: 0
RECTAL PAIN: 0
SORE THROAT: 0
COLOR CHANGE: 0
CONSTIPATION: 0
APNEA: 0
STRIDOR: 0
ANAL BLEEDING: 0
EYE REDNESS: 0
CHOKING: 0

## 2017-12-08 NOTE — PROGRESS NOTES
Chief Complaint   Patient presents with    Hirsutism     3 rd laser TX face/ neck          SUBJECTIVE:  HPI: Patient is here with complaints of Facial hair. Thoroughly reviewed the patient's medical history, family history, social history and review of systems with the patient today in the office. Please see medical record for pertinent positives. Past Medical History:   Diagnosis Date    Fatty liver     by US    Fibromyalgia     GERD (gastroesophageal reflux disease)     by UGI    Gout     Hyperlipidemia     Hypertension     Hypoglycemia     IBS (irritable bowel syndrome)     Prolonged emergence from general anesthesia     PSVT (paroxysmal supraventricular tachycardia) (HCC)     s/p ablation      Past Surgical History:   Procedure Laterality Date    ABDOMEN SURGERY       Cholecystectomy    ABDOMEN SURGERY      Rt ovarian adhesions removed and cervical laser therapy/Dr Karrie Paul ABDOMEN SURGERY      Rt ovarian cyst removed    ABLATION OF DYSRHYTHMIC FOCUS      APPENDECTOMY      BONE CYST EXCISION  1980    Left bone cyst aneurysm removed    CHOLECYSTECTOMY      COLONOSCOPY  1/19/2016    diverticulosis    FOOT NEUROMA SURGERY Right     TONSILLECTOMY       Current Outpatient Prescriptions   Medication Sig Dispense Refill    omeprazole (PRILOSEC) 20 MG delayed release capsule Take 1 capsule by mouth Daily 90 capsule 3    Icosapent Ethyl (VASCEPA) 1 g CAPS capsule Take 1 capsule by mouth daily 30 capsule 5    HYDROcodone-acetaminophen (NORCO) 5-325 MG per tablet Take 1 tablet by mouth 2 times daily as needed for Pain .  Earliest Fill Date: 12/1/17 60 tablet 0    famciclovir (FAMVIR) 500 MG tablet Take 3 tablets as a single dose as directed 6 tablet 2    aspirin 325 MG tablet Take 325 mg by mouth as needed for Pain      ALPRAZolam (XANAX) 0.25 MG tablet Take 0.25 mg by mouth as needed for Anxiety      choline fenofibrate (TRILIPIX) 135 MG CPDR delayed release capsule TAKE ONE CAPSULE swelling and myalgias. Skin: Negative for color change and pallor. Allergic/Immunologic: Negative for environmental allergies. Neurological: Negative for syncope and speech difficulty. Psychiatric/Behavioral: Negative for confusion and hallucinations. OBJECTIVE:  Physical Exam:    Ht 5' 2.01\" (1.575 m)   Wt 166 lb 0.1 oz (75.3 kg)   BMI 30.36 kg/m²      Physical Exam   Constitutional: She is oriented to person, place, and time. She appears well-developed and well-nourished. HENT:   Head: Normocephalic. Eyes: Pupils are equal, round, and reactive to light. Neck: Normal range of motion. Neck supple. Cardiovascular: Normal rate. Pulmonary/Chest: Effort normal.   Abdominal: Soft. She exhibits no distension and no mass. There is no tenderness. There is no rebound and no guarding. Musculoskeletal: Normal range of motion. Neurological: She is alert and oriented to person, place, and time. Skin: Skin is warm. Psychiatric: She has a normal mood and affect. ASSESSMENT:  1. Hirsutism          PLAN:  Treatment:  Patient completed laser hair removal using the 755 laser head. Patient counseled on risks, benefits, and alternatives of treatment plan at length today. Patient states an understanding and willingness to proceed with plan. No orders of the defined types were placed in this encounter. No orders of the defined types were placed in this encounter. Follow Up:  Return if symptoms worsen or fail to improve.       Ty Hernandez MD

## 2017-12-10 ASSESSMENT — ENCOUNTER SYMPTOMS
SORE THROAT: 0
ANAL BLEEDING: 0
PHOTOPHOBIA: 0
STRIDOR: 0
CHOKING: 0
CONSTIPATION: 0
EYE REDNESS: 0
COLOR CHANGE: 0
EYE ITCHING: 0
RECTAL PAIN: 0
BACK PAIN: 0
APNEA: 0

## 2017-12-11 NOTE — PROGRESS NOTES
Chief Complaint   Patient presents with    Hirsutism     c/c-hair growth-laser-2nd  Laser Procedure Cheeks, upper lip, Chin Neck         SUBJECTIVE:  HPI: Patient is here with complaints of Facial hair. She is here to have 2nd laser hair therapy. Thoroughly reviewed the patient's medical history, family history, social history and review of systems with the patient today in the office. Please see medical record for pertinent positives. Past Medical History:   Diagnosis Date    Fatty liver     by US    Fibromyalgia     GERD (gastroesophageal reflux disease)     by UGI    Gout     Hyperlipidemia     Hypertension     Hypoglycemia     IBS (irritable bowel syndrome)     Prolonged emergence from general anesthesia     PSVT (paroxysmal supraventricular tachycardia) (HCC)     s/p ablation      Past Surgical History:   Procedure Laterality Date    ABDOMEN SURGERY       Cholecystectomy    ABDOMEN SURGERY      Rt ovarian adhesions removed and cervical laser therapy/Dr Royer Ignacio ABDOMEN SURGERY      Rt ovarian cyst removed    ABLATION OF DYSRHYTHMIC FOCUS      APPENDECTOMY      BONE CYST EXCISION  1980    Left bone cyst aneurysm removed    CHOLECYSTECTOMY      COLONOSCOPY  1/19/2016    diverticulosis    FOOT NEUROMA SURGERY Right     TONSILLECTOMY       Current Outpatient Prescriptions   Medication Sig Dispense Refill    omeprazole (PRILOSEC) 20 MG delayed release capsule Take 1 capsule by mouth Daily 90 capsule 3    Icosapent Ethyl (VASCEPA) 1 g CAPS capsule Take 1 capsule by mouth daily 30 capsule 5    HYDROcodone-acetaminophen (NORCO) 5-325 MG per tablet Take 1 tablet by mouth 2 times daily as needed for Pain .  Earliest Fill Date: 12/1/17 60 tablet 0    famciclovir (FAMVIR) 500 MG tablet Take 3 tablets as a single dose as directed 6 tablet 2    aspirin 325 MG tablet Take 325 mg by mouth as needed for Pain      ALPRAZolam (XANAX) 0.25 MG tablet Take 0.25 mg by mouth as needed for Anxiety

## 2018-01-02 ENCOUNTER — OFFICE VISIT (OUTPATIENT)
Dept: INTERNAL MEDICINE CLINIC | Age: 58
End: 2018-01-02

## 2018-01-02 VITALS
WEIGHT: 173 LBS | SYSTOLIC BLOOD PRESSURE: 130 MMHG | DIASTOLIC BLOOD PRESSURE: 80 MMHG | BODY MASS INDEX: 31.63 KG/M2 | HEART RATE: 88 BPM

## 2018-01-02 DIAGNOSIS — R73.02 GLUCOSE INTOLERANCE (IMPAIRED GLUCOSE TOLERANCE): ICD-10-CM

## 2018-01-02 DIAGNOSIS — E78.2 MIXED HYPERLIPIDEMIA: ICD-10-CM

## 2018-01-02 DIAGNOSIS — R20.2 PARESTHESIAS: Primary | ICD-10-CM

## 2018-01-02 DIAGNOSIS — I10 ESSENTIAL HYPERTENSION: ICD-10-CM

## 2018-01-02 PROCEDURE — 99213 OFFICE O/P EST LOW 20 MIN: CPT | Performed by: INTERNAL MEDICINE

## 2018-01-24 ENCOUNTER — OFFICE VISIT (OUTPATIENT)
Dept: PHYSICAL MEDICINE AND REHAB | Age: 58
End: 2018-01-24

## 2018-01-24 DIAGNOSIS — G56.03 BILATERAL CARPAL TUNNEL SYNDROME: Primary | ICD-10-CM

## 2018-01-24 DIAGNOSIS — M79.602 PARESTHESIA AND PAIN OF BOTH UPPER EXTREMITIES: ICD-10-CM

## 2018-01-24 DIAGNOSIS — M79.601 PARESTHESIA AND PAIN OF BOTH UPPER EXTREMITIES: ICD-10-CM

## 2018-01-24 DIAGNOSIS — R52 UNCONTROLLED PAIN: ICD-10-CM

## 2018-01-24 DIAGNOSIS — R20.2 PARESTHESIA AND PAIN OF BOTH UPPER EXTREMITIES: ICD-10-CM

## 2018-01-24 DIAGNOSIS — M54.18 SACRAL RADICULOPATHY: ICD-10-CM

## 2018-01-24 PROCEDURE — 95913 NRV CNDJ TEST 13/> STUDIES: CPT | Performed by: PHYSICAL MEDICINE & REHABILITATION

## 2018-01-24 PROCEDURE — 95886 MUSC TEST DONE W/N TEST COMP: CPT | Performed by: PHYSICAL MEDICINE & REHABILITATION

## 2018-01-27 NOTE — PROGRESS NOTES
Volutional  MUAP's   Lumbar paraspinals Guarding None Normal Guarding None Normal   Glut Med Normal None Normal Normal None Normal   Rect fem Normal None Normal Normal None Normal   Vast Med Normal None Normal Normal None Normal   Ant Tibialis Normal None Normal Normal None Normal   EHL Normal None Normal Normal None Normal   FDL Normal None Normal Normal None Normal   Gastroc Normal None Normal Normal None Normal   EDB Normal None Dec  #, Larger Normal None Dec #, Larger   1st dors int Normal None Dec #, Larger Normal None Dec # Larger         RIGHT   LEFT     Insertional Activity Spontaneous  Activity Volutional  MUAP's Insertional Activity Spontaneous  Activity Volutional  MUAP's   Cerv Parasp Normal None Normal Normal None Normal   Infraspinatus Normal None Normal Normal None Normal   Deltoid   Normal None Normal Normal None Normal   Biceps   Normal None Normal Normal None Normal   Triceps   Normal None Normal Normal None Normal   Pronator Teres Normal None Normal Normal None Normal   Extensor Indices Normal None Normal Normal None Normal   1st Dorsal Interosseus Normal None Normal Normal None Normal   Abductor Pollicis Br. Increased + Dec #. Polys Increased ++ Dec #, Larger, Polys       FINDINGS:   EMG of the cervical and lumbar paraspinals and all 4 limbs demonstrated no paraspinal abnormalities. Positive waves and fibrillations were limited to the APB muscles of each hand. In those irritable muscles, motor units were diminished in number and larger. A diminished number of larger motor units were also noted in the foot intrinsic muscles of both feet. The median sensory and motor latencies were significantly delayed across both wrists. All other sensory and motor latencies, evoked amplitudes and conduction velocities were normal.      IMPRESSION:      1. Abnormal EMG. 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      2. The foot intrinsic

## 2018-01-31 ENCOUNTER — TELEPHONE (OUTPATIENT)
Dept: INTERNAL MEDICINE CLINIC | Age: 58
End: 2018-01-31

## 2018-01-31 DIAGNOSIS — G56.03 BILATERAL CARPAL TUNNEL SYNDROME: Primary | ICD-10-CM

## 2018-02-08 DIAGNOSIS — M54.50 CHRONIC MIDLINE LOW BACK PAIN WITHOUT SCIATICA: Primary | ICD-10-CM

## 2018-02-08 DIAGNOSIS — G89.29 CHRONIC MIDLINE LOW BACK PAIN WITHOUT SCIATICA: Primary | ICD-10-CM

## 2018-02-08 RX ORDER — HYDROCODONE BITARTRATE AND ACETAMINOPHEN 5; 325 MG/1; MG/1
1 TABLET ORAL 2 TIMES DAILY PRN
Qty: 60 TABLET | Refills: 0 | Status: SHIPPED | OUTPATIENT
Start: 2018-02-08 | End: 2018-04-18 | Stop reason: SDUPTHER

## 2018-02-08 NOTE — TELEPHONE ENCOUNTER
Controlled Substances Monitoring: The Prescription Monitoring Report for this patient was reviewed today. Perry Rangel MD)    No signs of potential drug abuse or diversion identified.  Perry Rangel MD)

## 2018-04-18 ENCOUNTER — OFFICE VISIT (OUTPATIENT)
Dept: INTERNAL MEDICINE CLINIC | Age: 58
End: 2018-04-18

## 2018-04-18 VITALS
HEIGHT: 63 IN | DIASTOLIC BLOOD PRESSURE: 80 MMHG | HEART RATE: 64 BPM | WEIGHT: 167.8 LBS | BODY MASS INDEX: 29.73 KG/M2 | SYSTOLIC BLOOD PRESSURE: 130 MMHG

## 2018-04-18 DIAGNOSIS — M54.50 CHRONIC MIDLINE LOW BACK PAIN WITHOUT SCIATICA: ICD-10-CM

## 2018-04-18 DIAGNOSIS — Z00.00 ANNUAL PHYSICAL EXAM: ICD-10-CM

## 2018-04-18 DIAGNOSIS — G89.29 CHRONIC MIDLINE LOW BACK PAIN WITHOUT SCIATICA: ICD-10-CM

## 2018-04-18 DIAGNOSIS — E55.9 VITAMIN D DEFICIENCY: ICD-10-CM

## 2018-04-18 DIAGNOSIS — R00.2 PALPITATIONS: Primary | ICD-10-CM

## 2018-04-18 LAB
A/G RATIO: 2.1 (ref 1.1–2.2)
ALBUMIN SERPL-MCNC: 4.8 G/DL (ref 3.4–5)
ALP BLD-CCNC: 66 U/L (ref 40–129)
ALT SERPL-CCNC: 27 U/L (ref 10–40)
ANION GAP SERPL CALCULATED.3IONS-SCNC: 15 MMOL/L (ref 3–16)
AST SERPL-CCNC: 18 U/L (ref 15–37)
BASOPHILS ABSOLUTE: 0 K/UL (ref 0–0.2)
BASOPHILS RELATIVE PERCENT: 0.4 %
BILIRUB SERPL-MCNC: 0.4 MG/DL (ref 0–1)
BILIRUBIN URINE: NEGATIVE
BLOOD, URINE: NEGATIVE
BUN BLDV-MCNC: 12 MG/DL (ref 7–20)
CALCIUM SERPL-MCNC: 9.8 MG/DL (ref 8.3–10.6)
CHLORIDE BLD-SCNC: 104 MMOL/L (ref 99–110)
CHOLESTEROL, TOTAL: 216 MG/DL (ref 0–199)
CLARITY: CLEAR
CO2: 27 MMOL/L (ref 21–32)
COLOR: YELLOW
CREAT SERPL-MCNC: 0.6 MG/DL (ref 0.6–1.1)
EOSINOPHILS ABSOLUTE: 0.1 K/UL (ref 0–0.6)
EOSINOPHILS RELATIVE PERCENT: 1.5 %
EPITHELIAL CELLS, UA: 1 /HPF (ref 0–5)
GFR AFRICAN AMERICAN: >60
GFR NON-AFRICAN AMERICAN: >60
GLOBULIN: 2.3 G/DL
GLUCOSE BLD-MCNC: 97 MG/DL (ref 70–99)
GLUCOSE URINE: NEGATIVE MG/DL
HCT VFR BLD CALC: 41.8 % (ref 36–48)
HDLC SERPL-MCNC: 43 MG/DL (ref 40–60)
HEMOGLOBIN: 14.4 G/DL (ref 12–16)
HYALINE CASTS: 1 /LPF (ref 0–8)
KETONES, URINE: NEGATIVE MG/DL
LDL CHOLESTEROL CALCULATED: 122 MG/DL
LEUKOCYTE ESTERASE, URINE: NEGATIVE
LYMPHOCYTES ABSOLUTE: 2.1 K/UL (ref 1–5.1)
LYMPHOCYTES RELATIVE PERCENT: 33.9 %
MCH RBC QN AUTO: 28.2 PG (ref 26–34)
MCHC RBC AUTO-ENTMCNC: 34.4 G/DL (ref 31–36)
MCV RBC AUTO: 81.9 FL (ref 80–100)
MICROSCOPIC EXAMINATION: NORMAL
MONOCYTES ABSOLUTE: 0.5 K/UL (ref 0–1.3)
MONOCYTES RELATIVE PERCENT: 7.9 %
NEUTROPHILS ABSOLUTE: 3.5 K/UL (ref 1.7–7.7)
NEUTROPHILS RELATIVE PERCENT: 56.3 %
NITRITE, URINE: NEGATIVE
PDW BLD-RTO: 14.7 % (ref 12.4–15.4)
PH UA: 5.5
PLATELET # BLD: 231 K/UL (ref 135–450)
PMV BLD AUTO: 8.9 FL (ref 5–10.5)
POTASSIUM SERPL-SCNC: 4.5 MMOL/L (ref 3.5–5.1)
PROTEIN UA: NEGATIVE MG/DL
RBC # BLD: 5.11 M/UL (ref 4–5.2)
RBC UA: 4 /HPF (ref 0–4)
SODIUM BLD-SCNC: 146 MMOL/L (ref 136–145)
SPECIFIC GRAVITY UA: 1.02
TOTAL PROTEIN: 7.1 G/DL (ref 6.4–8.2)
TRIGL SERPL-MCNC: 255 MG/DL (ref 0–150)
TSH SERPL DL<=0.05 MIU/L-ACNC: 1.43 UIU/ML (ref 0.27–4.2)
URIC ACID, SERUM: 6.5 MG/DL (ref 2.6–6)
UROBILINOGEN, URINE: 0.2 E.U./DL
VITAMIN D 25-HYDROXY: 32.7 NG/ML
VLDLC SERPL CALC-MCNC: 51 MG/DL
WBC # BLD: 6.2 K/UL (ref 4–11)
WBC UA: 4 /HPF (ref 0–5)

## 2018-04-18 PROCEDURE — 99396 PREV VISIT EST AGE 40-64: CPT | Performed by: INTERNAL MEDICINE

## 2018-04-18 PROCEDURE — 93000 ELECTROCARDIOGRAM COMPLETE: CPT | Performed by: INTERNAL MEDICINE

## 2018-04-18 PROCEDURE — 99214 OFFICE O/P EST MOD 30 MIN: CPT | Performed by: INTERNAL MEDICINE

## 2018-04-18 PROCEDURE — 81001 URINALYSIS AUTO W/SCOPE: CPT | Performed by: INTERNAL MEDICINE

## 2018-04-18 RX ORDER — GABAPENTIN 100 MG/1
100 CAPSULE ORAL DAILY
Qty: 30 CAPSULE | Refills: 5 | Status: SHIPPED | OUTPATIENT
Start: 2018-04-18 | End: 2019-04-23 | Stop reason: CLARIF

## 2018-04-18 RX ORDER — TORSEMIDE 5 MG/1
TABLET ORAL
Qty: 90 TABLET | Refills: 3 | Status: SHIPPED | OUTPATIENT
Start: 2018-04-18 | End: 2020-01-28

## 2018-04-18 RX ORDER — DILTIAZEM HYDROCHLORIDE 180 MG/1
180 CAPSULE, COATED, EXTENDED RELEASE ORAL DAILY
Qty: 90 CAPSULE | Refills: 3 | Status: SHIPPED | OUTPATIENT
Start: 2018-04-18 | End: 2019-04-23 | Stop reason: SDUPTHER

## 2018-04-18 RX ORDER — HYDROCODONE BITARTRATE AND ACETAMINOPHEN 5; 325 MG/1; MG/1
1 TABLET ORAL 2 TIMES DAILY PRN
Qty: 60 TABLET | Refills: 0 | Status: SHIPPED | OUTPATIENT
Start: 2018-04-18 | End: 2018-09-26 | Stop reason: SDUPTHER

## 2018-04-19 LAB
ESTIMATED AVERAGE GLUCOSE: 111.2 MG/DL
HBA1C MFR BLD: 5.5 %

## 2018-04-26 ENCOUNTER — HOSPITAL ENCOUNTER (OUTPATIENT)
Dept: WOMENS IMAGING | Age: 58
Discharge: HOME | End: 2018-04-26
Attending: INTERNAL MEDICINE | Admitting: INTERNAL MEDICINE

## 2018-04-26 DIAGNOSIS — Z00.00 ANNUAL PHYSICAL EXAM: ICD-10-CM

## 2018-04-26 DIAGNOSIS — Z12.39 BREAST CANCER SCREENING: ICD-10-CM

## 2018-04-30 DIAGNOSIS — G56.03 BILATERAL CARPAL TUNNEL SYNDROME: Primary | ICD-10-CM

## 2018-05-02 ENCOUNTER — OFFICE VISIT (OUTPATIENT)
Dept: INTERNAL MEDICINE CLINIC | Age: 58
End: 2018-05-02

## 2018-05-02 VITALS — RESPIRATION RATE: 16 BRPM | DIASTOLIC BLOOD PRESSURE: 90 MMHG | SYSTOLIC BLOOD PRESSURE: 130 MMHG | HEART RATE: 72 BPM

## 2018-05-02 DIAGNOSIS — E78.2 MIXED HYPERLIPIDEMIA: ICD-10-CM

## 2018-05-02 DIAGNOSIS — G56.03 BILATERAL CARPAL TUNNEL SYNDROME: ICD-10-CM

## 2018-05-02 DIAGNOSIS — I10 ESSENTIAL HYPERTENSION: ICD-10-CM

## 2018-05-02 DIAGNOSIS — J34.89 LESION OF NOSTRIL: Primary | ICD-10-CM

## 2018-05-02 PROCEDURE — 99213 OFFICE O/P EST LOW 20 MIN: CPT | Performed by: INTERNAL MEDICINE

## 2018-06-04 ENCOUNTER — NURSE ONLY (OUTPATIENT)
Dept: INTERNAL MEDICINE CLINIC | Age: 58
End: 2018-06-04

## 2018-06-04 VITALS — HEART RATE: 62 BPM | SYSTOLIC BLOOD PRESSURE: 142 MMHG | DIASTOLIC BLOOD PRESSURE: 88 MMHG

## 2018-06-25 ENCOUNTER — TELEPHONE (OUTPATIENT)
Dept: INTERNAL MEDICINE CLINIC | Age: 58
End: 2018-06-25

## 2018-06-25 RX ORDER — DICYCLOMINE HYDROCHLORIDE 10 MG/1
10 CAPSULE ORAL EVERY 8 HOURS PRN
Qty: 30 CAPSULE | Refills: 1 | Status: SHIPPED | OUTPATIENT
Start: 2018-06-25 | End: 2020-01-28

## 2018-09-26 DIAGNOSIS — M54.50 CHRONIC MIDLINE LOW BACK PAIN WITHOUT SCIATICA: ICD-10-CM

## 2018-09-26 DIAGNOSIS — G89.29 CHRONIC MIDLINE LOW BACK PAIN WITHOUT SCIATICA: ICD-10-CM

## 2018-09-26 RX ORDER — HYDROCODONE BITARTRATE AND ACETAMINOPHEN 5; 325 MG/1; MG/1
1 TABLET ORAL 2 TIMES DAILY PRN
Qty: 60 TABLET | Refills: 0 | Status: SHIPPED | OUTPATIENT
Start: 2018-09-26 | End: 2018-12-20 | Stop reason: SDUPTHER

## 2018-09-26 NOTE — TELEPHONE ENCOUNTER
Controlled Substances Monitoring:     RX Monitoring 9/26/2018   Attestation The Prescription Monitoring Report for this patient was reviewed today. Documentation No signs of potential drug abuse or diversion identified.

## 2018-10-03 ENCOUNTER — IMMUNIZATION (OUTPATIENT)
Dept: INTERNAL MEDICINE CLINIC | Age: 58
End: 2018-10-03
Payer: COMMERCIAL

## 2018-10-03 DIAGNOSIS — Z23 NEED FOR INFLUENZA VACCINATION: Primary | ICD-10-CM

## 2018-10-03 PROCEDURE — 90686 IIV4 VACC NO PRSV 0.5 ML IM: CPT | Performed by: INTERNAL MEDICINE

## 2018-10-03 PROCEDURE — 90471 IMMUNIZATION ADMIN: CPT | Performed by: INTERNAL MEDICINE

## 2018-10-19 DIAGNOSIS — M1A.9XX1 CHRONIC GOUT WITH TOPHUS, UNSPECIFIED CAUSE, UNSPECIFIED SITE: ICD-10-CM

## 2018-10-19 DIAGNOSIS — I10 ESSENTIAL HYPERTENSION: Primary | ICD-10-CM

## 2018-10-19 DIAGNOSIS — E03.9 HYPOTHYROIDISM, UNSPECIFIED TYPE: ICD-10-CM

## 2018-10-19 DIAGNOSIS — E79.0 HYPERURICEMIA: ICD-10-CM

## 2018-10-19 DIAGNOSIS — R73.02 GLUCOSE INTOLERANCE (IMPAIRED GLUCOSE TOLERANCE): ICD-10-CM

## 2018-10-19 DIAGNOSIS — E78.2 MIXED HYPERLIPIDEMIA: ICD-10-CM

## 2018-10-29 ENCOUNTER — HOSPITAL ENCOUNTER (OUTPATIENT)
Age: 58
Discharge: HOME | End: 2018-10-29
Payer: COMMERCIAL

## 2018-10-29 LAB
ALBUMIN SERPL-MCNC: 4.8 GM/DL (ref 3.4–5)
ALP BLD-CCNC: 62 IU/L (ref 40–129)
ALT SERPL-CCNC: 24 U/L (ref 10–40)
ANION GAP SERPL CALCULATED.3IONS-SCNC: 15 MMOL/L (ref 4–16)
AST SERPL-CCNC: 17 IU/L (ref 15–37)
BASOPHILS ABSOLUTE: 0 K/CU MM
BASOPHILS RELATIVE PERCENT: 0.3 % (ref 0–1)
BILIRUB SERPL-MCNC: 0.5 MG/DL (ref 0–1)
BILIRUBIN DIRECT: 0.2 MG/DL (ref 0–0.3)
BILIRUBIN, INDIRECT: 0.3 MG/DL (ref 0–0.7)
BUN BLDV-MCNC: 12 MG/DL (ref 6–23)
CALCIUM SERPL-MCNC: 9.8 MG/DL (ref 8.3–10.6)
CHLORIDE BLD-SCNC: 101 MMOL/L (ref 99–110)
CHOLESTEROL: 204 MG/DL
CO2: 27 MMOL/L (ref 21–32)
CREAT SERPL-MCNC: 0.9 MG/DL (ref 0.6–1.1)
DIFFERENTIAL TYPE: ABNORMAL
EOSINOPHILS ABSOLUTE: 0.1 K/CU MM
EOSINOPHILS RELATIVE PERCENT: 1.2 % (ref 0–3)
ESTIMATED AVERAGE GLUCOSE: 105 MG/DL
GFR AFRICAN AMERICAN: >60 ML/MIN/1.73M2
GFR NON-AFRICAN AMERICAN: >60 ML/MIN/1.73M2
GLUCOSE BLD-MCNC: 105 MG/DL (ref 70–99)
HBA1C MFR BLD: 5.3 % (ref 4.2–6.3)
HCT VFR BLD CALC: 43.9 % (ref 37–47)
HDLC SERPL-MCNC: 39 MG/DL
HEMOGLOBIN: 14.1 GM/DL (ref 12.5–16)
IMMATURE NEUTROPHIL %: 0.3 % (ref 0–0.43)
LDL CHOLESTEROL DIRECT: 138 MG/DL
LYMPHOCYTES ABSOLUTE: 2.7 K/CU MM
LYMPHOCYTES RELATIVE PERCENT: 29.5 % (ref 24–44)
MCH RBC QN AUTO: 28.8 PG (ref 27–31)
MCHC RBC AUTO-ENTMCNC: 32.1 % (ref 32–36)
MCV RBC AUTO: 89.6 FL (ref 78–100)
MONOCYTES ABSOLUTE: 0.7 K/CU MM
MONOCYTES RELATIVE PERCENT: 7.3 % (ref 0–4)
NUCLEATED RBC %: 0 %
PDW BLD-RTO: 13.8 % (ref 11.7–14.9)
PLATELET # BLD: 294 K/CU MM (ref 140–440)
PMV BLD AUTO: 10.1 FL (ref 7.5–11.1)
POTASSIUM SERPL-SCNC: 4 MMOL/L (ref 3.5–5.1)
RBC # BLD: 4.9 M/CU MM (ref 4.2–5.4)
SEGMENTED NEUTROPHILS ABSOLUTE COUNT: 5.5 K/CU MM
SEGMENTED NEUTROPHILS RELATIVE PERCENT: 61.4 % (ref 36–66)
SODIUM BLD-SCNC: 143 MMOL/L (ref 135–145)
TOTAL CK: 68 IU/L (ref 26–140)
TOTAL IMMATURE NEUTOROPHIL: 0.03 K/CU MM
TOTAL NUCLEATED RBC: 0 K/CU MM
TOTAL PROTEIN: 7.1 GM/DL (ref 6.4–8.2)
TRIGL SERPL-MCNC: 251 MG/DL
TSH HIGH SENSITIVITY: 1.85 UIU/ML (ref 0.27–4.2)
URIC ACID: 7 MG/DL (ref 2.6–6)
WBC # BLD: 9 K/CU MM (ref 4–10.5)

## 2018-10-29 PROCEDURE — 82550 ASSAY OF CK (CPK): CPT

## 2018-10-29 PROCEDURE — 83036 HEMOGLOBIN GLYCOSYLATED A1C: CPT

## 2018-10-29 PROCEDURE — 84550 ASSAY OF BLOOD/URIC ACID: CPT

## 2018-10-29 PROCEDURE — 85025 COMPLETE CBC W/AUTO DIFF WBC: CPT

## 2018-10-29 PROCEDURE — 80061 LIPID PANEL: CPT

## 2018-10-29 PROCEDURE — 36415 COLL VENOUS BLD VENIPUNCTURE: CPT

## 2018-10-29 PROCEDURE — 80053 COMPREHEN METABOLIC PANEL: CPT

## 2018-10-29 PROCEDURE — 82248 BILIRUBIN DIRECT: CPT

## 2018-10-29 PROCEDURE — 83721 ASSAY OF BLOOD LIPOPROTEIN: CPT

## 2018-10-29 PROCEDURE — 84443 ASSAY THYROID STIM HORMONE: CPT

## 2018-11-02 ENCOUNTER — OFFICE VISIT (OUTPATIENT)
Dept: INTERNAL MEDICINE CLINIC | Age: 58
End: 2018-11-02
Payer: COMMERCIAL

## 2018-11-02 VITALS
WEIGHT: 169 LBS | SYSTOLIC BLOOD PRESSURE: 132 MMHG | BODY MASS INDEX: 30.18 KG/M2 | DIASTOLIC BLOOD PRESSURE: 76 MMHG | HEART RATE: 76 BPM

## 2018-11-02 DIAGNOSIS — E79.0 HYPERURICEMIA: ICD-10-CM

## 2018-11-02 DIAGNOSIS — E74.39 GLUCOSE INTOLERANCE: ICD-10-CM

## 2018-11-02 DIAGNOSIS — E78.2 MIXED HYPERLIPIDEMIA: ICD-10-CM

## 2018-11-02 DIAGNOSIS — I10 ESSENTIAL HYPERTENSION: Primary | ICD-10-CM

## 2018-11-02 PROCEDURE — 99213 OFFICE O/P EST LOW 20 MIN: CPT | Performed by: INTERNAL MEDICINE

## 2018-11-02 ASSESSMENT — PATIENT HEALTH QUESTIONNAIRE - PHQ9
SUM OF ALL RESPONSES TO PHQ QUESTIONS 1-9: 1
1. LITTLE INTEREST OR PLEASURE IN DOING THINGS: 1
SUM OF ALL RESPONSES TO PHQ QUESTIONS 1-9: 1
SUM OF ALL RESPONSES TO PHQ9 QUESTIONS 1 & 2: 1
2. FEELING DOWN, DEPRESSED OR HOPELESS: 0

## 2018-12-20 DIAGNOSIS — G89.29 CHRONIC MIDLINE LOW BACK PAIN WITHOUT SCIATICA: ICD-10-CM

## 2018-12-20 DIAGNOSIS — M54.50 CHRONIC MIDLINE LOW BACK PAIN WITHOUT SCIATICA: ICD-10-CM

## 2018-12-20 RX ORDER — HYDROCODONE BITARTRATE AND ACETAMINOPHEN 5; 325 MG/1; MG/1
1 TABLET ORAL 2 TIMES DAILY PRN
Qty: 60 TABLET | Refills: 0 | Status: SHIPPED | OUTPATIENT
Start: 2018-12-20 | End: 2019-01-21 | Stop reason: SDUPTHER

## 2019-01-21 DIAGNOSIS — G89.29 CHRONIC MIDLINE LOW BACK PAIN WITHOUT SCIATICA: ICD-10-CM

## 2019-01-21 DIAGNOSIS — M54.50 CHRONIC MIDLINE LOW BACK PAIN WITHOUT SCIATICA: ICD-10-CM

## 2019-01-21 RX ORDER — HYDROCODONE BITARTRATE AND ACETAMINOPHEN 5; 325 MG/1; MG/1
1 TABLET ORAL 2 TIMES DAILY PRN
Qty: 60 TABLET | Refills: 0 | Status: SHIPPED | OUTPATIENT
Start: 2019-01-21 | End: 2019-04-23 | Stop reason: SDUPTHER

## 2019-02-08 ENCOUNTER — OFFICE VISIT (OUTPATIENT)
Dept: INTERNAL MEDICINE CLINIC | Age: 59
End: 2019-02-08
Payer: COMMERCIAL

## 2019-02-08 VITALS
RESPIRATION RATE: 15 BRPM | BODY MASS INDEX: 30.89 KG/M2 | HEART RATE: 68 BPM | WEIGHT: 173 LBS | DIASTOLIC BLOOD PRESSURE: 82 MMHG | SYSTOLIC BLOOD PRESSURE: 126 MMHG

## 2019-02-08 DIAGNOSIS — E79.0 HYPERURICEMIA: ICD-10-CM

## 2019-02-08 DIAGNOSIS — I10 ESSENTIAL HYPERTENSION: ICD-10-CM

## 2019-02-08 DIAGNOSIS — M19.90 ARTHRITIS: Primary | ICD-10-CM

## 2019-02-08 DIAGNOSIS — M79.672 BILATERAL FOOT PAIN: ICD-10-CM

## 2019-02-08 DIAGNOSIS — M79.671 BILATERAL FOOT PAIN: ICD-10-CM

## 2019-02-08 LAB
HEMOGLOBIN: 14.8 G/DL (ref 12–16)
SEDIMENTATION RATE, ERYTHROCYTE: 9 MM/HR (ref 0–30)
URIC ACID, SERUM: 7.5 MG/DL (ref 2.6–6)

## 2019-02-08 PROCEDURE — 99213 OFFICE O/P EST LOW 20 MIN: CPT | Performed by: INTERNAL MEDICINE

## 2019-02-08 ASSESSMENT — PATIENT HEALTH QUESTIONNAIRE - PHQ9
SUM OF ALL RESPONSES TO PHQ QUESTIONS 1-9: 0
1. LITTLE INTEREST OR PLEASURE IN DOING THINGS: 0
SUM OF ALL RESPONSES TO PHQ QUESTIONS 1-9: 0
2. FEELING DOWN, DEPRESSED OR HOPELESS: 0
SUM OF ALL RESPONSES TO PHQ9 QUESTIONS 1 & 2: 0

## 2019-03-18 ENCOUNTER — TELEPHONE (OUTPATIENT)
Dept: INTERNAL MEDICINE CLINIC | Age: 59
End: 2019-03-18

## 2019-03-18 RX ORDER — FEBUXOSTAT 40 MG/1
40 TABLET, FILM COATED ORAL DAILY
Qty: 30 TABLET | Refills: 5 | Status: SHIPPED | OUTPATIENT
Start: 2019-03-18 | End: 2019-11-04 | Stop reason: CLARIF

## 2019-04-23 ENCOUNTER — OFFICE VISIT (OUTPATIENT)
Dept: INTERNAL MEDICINE CLINIC | Age: 59
End: 2019-04-23
Payer: COMMERCIAL

## 2019-04-23 VITALS
WEIGHT: 165 LBS | HEART RATE: 68 BPM | BODY MASS INDEX: 29.23 KG/M2 | HEIGHT: 63 IN | RESPIRATION RATE: 15 BRPM | SYSTOLIC BLOOD PRESSURE: 148 MMHG | DIASTOLIC BLOOD PRESSURE: 80 MMHG

## 2019-04-23 DIAGNOSIS — L98.9 SKIN LESION: ICD-10-CM

## 2019-04-23 DIAGNOSIS — Z23 NEED FOR PROPHYLACTIC VACCINATION AND INOCULATION AGAINST VARICELLA: ICD-10-CM

## 2019-04-23 DIAGNOSIS — Z12.39 SCREENING BREAST EXAMINATION: ICD-10-CM

## 2019-04-23 DIAGNOSIS — E55.9 VITAMIN D DEFICIENCY: ICD-10-CM

## 2019-04-23 DIAGNOSIS — M54.50 CHRONIC MIDLINE LOW BACK PAIN WITHOUT SCIATICA: ICD-10-CM

## 2019-04-23 DIAGNOSIS — Z00.00 WELLNESS EXAMINATION: ICD-10-CM

## 2019-04-23 DIAGNOSIS — G89.29 CHRONIC MIDLINE LOW BACK PAIN WITHOUT SCIATICA: ICD-10-CM

## 2019-04-23 DIAGNOSIS — R00.2 PALPITATIONS: ICD-10-CM

## 2019-04-23 DIAGNOSIS — Z00.00 WELLNESS EXAMINATION: Primary | ICD-10-CM

## 2019-04-23 LAB
A/G RATIO: 1.9 (ref 1.1–2.2)
ALBUMIN SERPL-MCNC: 4.7 G/DL (ref 3.4–5)
ALP BLD-CCNC: 87 U/L (ref 40–129)
ALT SERPL-CCNC: 35 U/L (ref 10–40)
ANION GAP SERPL CALCULATED.3IONS-SCNC: 14 MMOL/L (ref 3–16)
AST SERPL-CCNC: 28 U/L (ref 15–37)
BASOPHILS ABSOLUTE: 0 K/UL (ref 0–0.2)
BASOPHILS RELATIVE PERCENT: 0.3 %
BILIRUB SERPL-MCNC: 0.5 MG/DL (ref 0–1)
BILIRUBIN URINE: NEGATIVE
BLOOD, URINE: NEGATIVE
BUN BLDV-MCNC: 8 MG/DL (ref 7–20)
CALCIUM SERPL-MCNC: 9.9 MG/DL (ref 8.3–10.6)
CASTS: ABNORMAL /LPF
CHLORIDE BLD-SCNC: 103 MMOL/L (ref 99–110)
CHOLESTEROL, TOTAL: 195 MG/DL (ref 0–199)
CLARITY: CLEAR
CO2: 27 MMOL/L (ref 21–32)
COLOR: YELLOW
CREAT SERPL-MCNC: 0.6 MG/DL (ref 0.6–1.2)
EOSINOPHILS ABSOLUTE: 0.1 K/UL (ref 0–0.6)
EOSINOPHILS RELATIVE PERCENT: 1.2 %
EPITHELIAL CELLS, UA: ABNORMAL /HPF
GFR AFRICAN AMERICAN: >60
GFR NON-AFRICAN AMERICAN: >60
GLOBULIN: 2.5 G/DL
GLUCOSE BLD-MCNC: 102 MG/DL (ref 70–99)
GLUCOSE URINE: NEGATIVE MG/DL
HCT VFR BLD CALC: 42.4 % (ref 36–48)
HDLC SERPL-MCNC: 34 MG/DL (ref 40–60)
HEMOGLOBIN: 14.3 G/DL (ref 12–16)
KETONES, URINE: NEGATIVE MG/DL
LDL CHOLESTEROL CALCULATED: ABNORMAL MG/DL
LDL CHOLESTEROL DIRECT: 80 MG/DL
LEUKOCYTE ESTERASE, URINE: NEGATIVE
LYMPHOCYTES ABSOLUTE: 2.3 K/UL (ref 1–5.1)
LYMPHOCYTES RELATIVE PERCENT: 31.2 %
MCH RBC QN AUTO: 28.6 PG (ref 26–34)
MCHC RBC AUTO-ENTMCNC: 33.8 G/DL (ref 31–36)
MCV RBC AUTO: 84.5 FL (ref 80–100)
MICROSCOPIC EXAMINATION: YES
MONOCYTES ABSOLUTE: 0.5 K/UL (ref 0–1.3)
MONOCYTES RELATIVE PERCENT: 7.2 %
NEUTROPHILS ABSOLUTE: 4.4 K/UL (ref 1.7–7.7)
NEUTROPHILS RELATIVE PERCENT: 60.1 %
NITRITE, URINE: NEGATIVE
PDW BLD-RTO: 14.3 % (ref 12.4–15.4)
PH UA: 7 (ref 5–8)
PLATELET # BLD: 275 K/UL (ref 135–450)
PMV BLD AUTO: 8.7 FL (ref 5–10.5)
POTASSIUM SERPL-SCNC: 4.4 MMOL/L (ref 3.5–5.1)
PROTEIN UA: ABNORMAL MG/DL
RBC # BLD: 5.02 M/UL (ref 4–5.2)
SODIUM BLD-SCNC: 144 MMOL/L (ref 136–145)
SPECIFIC GRAVITY UA: 1.02 (ref 1–1.03)
TOTAL PROTEIN: 7.2 G/DL (ref 6.4–8.2)
TRIGL SERPL-MCNC: 590 MG/DL (ref 0–150)
TSH SERPL DL<=0.05 MIU/L-ACNC: 1.07 UIU/ML (ref 0.27–4.2)
URIC ACID, SERUM: 5.6 MG/DL (ref 2.6–6)
UROBILINOGEN, URINE: 0.2 E.U./DL
VITAMIN D 25-HYDROXY: 32.8 NG/ML
VLDLC SERPL CALC-MCNC: ABNORMAL MG/DL
WBC # BLD: 7.4 K/UL (ref 4–11)
WBC UA: ABNORMAL /HPF (ref 0–5)

## 2019-04-23 PROCEDURE — 99396 PREV VISIT EST AGE 40-64: CPT | Performed by: INTERNAL MEDICINE

## 2019-04-23 PROCEDURE — 93000 ELECTROCARDIOGRAM COMPLETE: CPT | Performed by: INTERNAL MEDICINE

## 2019-04-23 RX ORDER — DILTIAZEM HYDROCHLORIDE 180 MG/1
180 CAPSULE, COATED, EXTENDED RELEASE ORAL DAILY
Qty: 90 CAPSULE | Refills: 3 | Status: SHIPPED | OUTPATIENT
Start: 2019-04-23 | End: 2020-01-28

## 2019-04-23 RX ORDER — HYDROCODONE BITARTRATE AND ACETAMINOPHEN 5; 325 MG/1; MG/1
1 TABLET ORAL 2 TIMES DAILY PRN
Qty: 60 TABLET | Refills: 0 | Status: SHIPPED | OUTPATIENT
Start: 2019-04-23 | End: 2019-08-01 | Stop reason: SDUPTHER

## 2019-04-23 ASSESSMENT — PATIENT HEALTH QUESTIONNAIRE - PHQ9
SUM OF ALL RESPONSES TO PHQ9 QUESTIONS 1 & 2: 0
1. LITTLE INTEREST OR PLEASURE IN DOING THINGS: 0
SUM OF ALL RESPONSES TO PHQ QUESTIONS 1-9: 0
SUM OF ALL RESPONSES TO PHQ QUESTIONS 1-9: 0
2. FEELING DOWN, DEPRESSED OR HOPELESS: 0

## 2019-04-23 NOTE — PROGRESS NOTES
[facility]) 50 MCG/0.5ML SUSR injection Inject 0.5 mLs into the muscle once for 1 dose 50 MCG IM then repeat 2-6 months. 1 each 1    diltiazem (CARDIZEM CD) 180 MG extended release capsule Take 1 capsule by mouth daily 90 capsule 3    HYDROcodone-acetaminophen (NORCO) 5-325 MG per tablet Take 1 tablet by mouth 2 times daily as needed for Pain (back pain) for up to 30 days. 60 tablet 0    febuxostat (ULORIC) 40 MG TABS tablet Take 1 tablet by mouth daily For uric acid. 30 tablet 5    dicyclomine (BENTYL) 10 MG capsule Take 1 capsule by mouth every 8 hours as needed (nausea/diarrhea) 30 capsule 1    choline fenofibrate (TRILIPIX) 135 MG CPDR delayed release capsule TAKE ONE CAPSULE BY MOUTH EVERY DAY 90 capsule 3    torsemide (DEMADEX) 5 MG tablet TAKE ONE (1) TABLET BY MOUTH DAILY AS NEEDED FOR SWELLING. 90 tablet 3    omeprazole (PRILOSEC) 20 MG delayed release capsule Take 1 capsule by mouth Daily 90 capsule 3    famciclovir (FAMVIR) 500 MG tablet Take 3 tablets as a single dose as directed 6 tablet 2    Coenzyme Q10 (CO Q 10 PO) Take by mouth as needed       Fexofenadine HCl (ALLEGRA PO) Take by mouth as needed       Cholecalciferol (VITAMIN D3) 2000 UNITS CAPS Take  by mouth 2 times daily.  Multiple Vitamins-Minerals (THERAPEUTIC MULTIVIT/MINERAL PO) Take 1 tablet by mouth daily as needed        No current facility-administered medications for this visit. ALL: sulfa and allopurinol cause a rash. SH:   No smoking or alcohol history. Avoids caffeine. FH:   Mother  age 76 of COPD. Father  80  with HTN, gout, hyperlipidemia, multiple myeloma. ROS:   Heent:         No headaches, voice change, hoarseness, or swallowing difficulties. Resp:          No Wheezing, coughing, shortness of breath, or hemoptysis. CV:              No rest or exertional chest pain. No  dizziness, or syncope.               GI:               No abdominal pain, change in bowel habits, hematochezia, or melanotic stools. :             No frequency, urgency, or hematuria. Neuro:         No symptoms of cranial nerve dysfunction, gait difficulties, or extremities weakness. Immun:       Up to date on influenza and TDap. PE:      VS:             Blood pressure (!) 148/80, pulse 68, resp. rate 15, height 5' 2.5\" (1.588 m), weight 165 lb (74.8 kg), not currently breastfeeding. GEN:           In no acute distress. Alert & oriented X 3. HEENT:      TMs and auditory canals are clear. Pupils equal and reactive to light. EOMs intact. Fundi were clear and the discs were flat. Oropharynx is unremarkable. No facial rash or lesions. NECK:        Supple and without palpable lymph nodes. Thyroid exam is normal.              LUNGS:      Clear to auscultation. CV:              Regular pulse. Normal S1 & S2. No murmur. No carotid bruits. BREASTS:  Palpation of breasts and axillary areas reveal no palpable lymphadenopathy or skin lesions. No skin retraction or dimpling is noted. ABD:           Bowel sounds are normal.  There is no distention. No bruits were auscultated. No area of tenderness, guarding, rebound, masses, or organomegaly. PELVIC:      Deferred. RECTAL:    Sphincter tone was normal.  No masses palpated. Stool is not melanotic and guaiac negative. EXT:           No edema and good distal arterial pulsations. SKIN:          Abnormal skin lesions over left upper abdominal wall              NEURO:     Cranial Nerves II-XII were grossly intact. The gait was normal and the patient was moving all extremities well. IMP:     1. Annual exam              2. Vit D deficiency                       PLAN:  1.  Comp chem lipid cbc tsh vitamin D cpk ua uric acid and EKG              2. Hgba1c & uric acid              3.

## 2019-04-24 LAB
ESTIMATED AVERAGE GLUCOSE: 119.8 MG/DL
HBA1C MFR BLD: 5.8 %

## 2019-05-02 ENCOUNTER — OFFICE VISIT (OUTPATIENT)
Dept: INTERNAL MEDICINE CLINIC | Age: 59
End: 2019-05-02
Payer: COMMERCIAL

## 2019-05-02 VITALS — RESPIRATION RATE: 15 BRPM | HEART RATE: 68 BPM | DIASTOLIC BLOOD PRESSURE: 84 MMHG | SYSTOLIC BLOOD PRESSURE: 130 MMHG

## 2019-05-02 DIAGNOSIS — I10 ESSENTIAL HYPERTENSION: ICD-10-CM

## 2019-05-02 DIAGNOSIS — E78.2 MIXED HYPERLIPIDEMIA: ICD-10-CM

## 2019-05-02 DIAGNOSIS — E78.1 HYPERTRIGLYCERIDEMIA: Primary | ICD-10-CM

## 2019-05-02 DIAGNOSIS — E79.0 HYPERURICEMIA: ICD-10-CM

## 2019-05-02 DIAGNOSIS — E74.39 GLUCOSE INTOLERANCE: ICD-10-CM

## 2019-05-02 DIAGNOSIS — I10 ESSENTIAL HYPERTENSION: Primary | ICD-10-CM

## 2019-05-02 PROCEDURE — 99213 OFFICE O/P EST LOW 20 MIN: CPT | Performed by: INTERNAL MEDICINE

## 2019-05-02 RX ORDER — ICOSAPENT ETHYL 1000 MG/1
1 CAPSULE ORAL 2 TIMES DAILY
Qty: 180 CAPSULE | Refills: 3 | Status: SHIPPED | OUTPATIENT
Start: 2019-05-02 | End: 2021-05-27 | Stop reason: SINTOL

## 2019-05-14 NOTE — PROGRESS NOTES
S: Patient presents with problems of hypertension, hyperlipidemia, fibromyalgia, GERD, and hyperuricemia. She has been unable to tolerate Allopurinol for her hyperuricemia. She is tolerating Uloric and a low purine diet. She has noted improvement in her joints. She is following a low carb diet and on the last OV given samples of Vascepa which she is tolerating. No polyuria or polydipsia. No headache, chest pain, or breathing difficulties. Her GERD symptoms are controlled with no swallowing difficulties. O:Blood pressure 130/84, pulse 68, resp. rate 15, not currently breastfeeding. Neck: No palpable lymph nodes, normal thyroid examination.        Lungs: clear      Cardio: reg pulse      Ext: no edema        LABS: from  4/23/19 CBC & UA normal, Lytes normal, BUN 8 Creat 0.6, Liver normal, Vit D 33, TSH 1.07, LDL 80 HDL 34 Trig 590, Hgba1c 5.8%, Uric Acid 5.6        EKG: NSR, normal     A: hypertension controlled      Hyperuricemia controlled with Uloric      Hypertriglyceridemia now on Vascepa       GERD controlled       HTN controlled     P: copy of labs and EKG given       Filled Vascepa 1G 1 bid with a meal #180 RX3       Lab slip for fasting Triglyceride on 8/2/19 & call results        OV in 6M with Basic chem Hgba1c lipid liver cpk cbc uric acid        H&P one year

## 2019-08-01 DIAGNOSIS — M54.50 CHRONIC MIDLINE LOW BACK PAIN WITHOUT SCIATICA: ICD-10-CM

## 2019-08-01 DIAGNOSIS — G89.29 CHRONIC MIDLINE LOW BACK PAIN WITHOUT SCIATICA: ICD-10-CM

## 2019-08-01 RX ORDER — HYDROCODONE BITARTRATE AND ACETAMINOPHEN 5; 325 MG/1; MG/1
1 TABLET ORAL 2 TIMES DAILY PRN
Qty: 60 TABLET | Refills: 0 | Status: SHIPPED | OUTPATIENT
Start: 2019-08-01 | End: 2019-10-01 | Stop reason: SDUPTHER

## 2019-09-27 ENCOUNTER — TELEPHONE (OUTPATIENT)
Dept: INTERNAL MEDICINE CLINIC | Age: 59
End: 2019-09-27

## 2019-09-27 DIAGNOSIS — M54.50 CHRONIC MIDLINE LOW BACK PAIN WITHOUT SCIATICA: ICD-10-CM

## 2019-09-27 DIAGNOSIS — G89.29 CHRONIC MIDLINE LOW BACK PAIN WITHOUT SCIATICA: ICD-10-CM

## 2019-10-01 RX ORDER — HYDROCODONE BITARTRATE AND ACETAMINOPHEN 5; 325 MG/1; MG/1
1 TABLET ORAL 2 TIMES DAILY PRN
Qty: 60 TABLET | Refills: 0 | Status: SHIPPED | OUTPATIENT
Start: 2019-10-01 | End: 2019-11-04 | Stop reason: SDUPTHER

## 2019-10-16 ENCOUNTER — NURSE ONLY (OUTPATIENT)
Dept: INTERNAL MEDICINE CLINIC | Age: 59
End: 2019-10-16
Payer: COMMERCIAL

## 2019-10-16 DIAGNOSIS — I10 ESSENTIAL HYPERTENSION: ICD-10-CM

## 2019-10-16 DIAGNOSIS — E79.0 HYPERURICEMIA: ICD-10-CM

## 2019-10-16 DIAGNOSIS — E78.2 MIXED HYPERLIPIDEMIA: ICD-10-CM

## 2019-10-16 DIAGNOSIS — E74.39 GLUCOSE INTOLERANCE: ICD-10-CM

## 2019-10-16 DIAGNOSIS — Z23 NEED FOR INFLUENZA VACCINATION: Primary | ICD-10-CM

## 2019-10-16 LAB
ALBUMIN SERPL-MCNC: 5.3 G/DL (ref 3.4–5)
ALP BLD-CCNC: 87 U/L (ref 40–129)
ALT SERPL-CCNC: 32 U/L (ref 10–40)
ANION GAP SERPL CALCULATED.3IONS-SCNC: 19 MMOL/L (ref 3–16)
AST SERPL-CCNC: 28 U/L (ref 15–37)
BASOPHILS ABSOLUTE: 0 K/UL (ref 0–0.2)
BASOPHILS RELATIVE PERCENT: 0.2 %
BILIRUB SERPL-MCNC: 0.4 MG/DL (ref 0–1)
BILIRUBIN DIRECT: <0.2 MG/DL (ref 0–0.3)
BILIRUBIN, INDIRECT: ABNORMAL MG/DL (ref 0–1)
BUN BLDV-MCNC: 9 MG/DL (ref 7–20)
CALCIUM SERPL-MCNC: 9.9 MG/DL (ref 8.3–10.6)
CHLORIDE BLD-SCNC: 103 MMOL/L (ref 99–110)
CHOLESTEROL, TOTAL: 194 MG/DL (ref 0–199)
CO2: 24 MMOL/L (ref 21–32)
CREAT SERPL-MCNC: 0.5 MG/DL (ref 0.6–1.2)
EOSINOPHILS ABSOLUTE: 0.1 K/UL (ref 0–0.6)
EOSINOPHILS RELATIVE PERCENT: 1.6 %
GFR AFRICAN AMERICAN: >60
GFR NON-AFRICAN AMERICAN: >60
GLUCOSE BLD-MCNC: 105 MG/DL (ref 70–99)
HCT VFR BLD CALC: 40.8 % (ref 36–48)
HDLC SERPL-MCNC: 32 MG/DL (ref 40–60)
HEMOGLOBIN: 14 G/DL (ref 12–16)
LDL CHOLESTEROL CALCULATED: ABNORMAL MG/DL
LDL CHOLESTEROL DIRECT: 73 MG/DL
LYMPHOCYTES ABSOLUTE: 2.4 K/UL (ref 1–5.1)
LYMPHOCYTES RELATIVE PERCENT: 28.2 %
MCH RBC QN AUTO: 28.5 PG (ref 26–34)
MCHC RBC AUTO-ENTMCNC: 34.2 G/DL (ref 31–36)
MCV RBC AUTO: 83.2 FL (ref 80–100)
MONOCYTES ABSOLUTE: 0.6 K/UL (ref 0–1.3)
MONOCYTES RELATIVE PERCENT: 6.7 %
NEUTROPHILS ABSOLUTE: 5.3 K/UL (ref 1.7–7.7)
NEUTROPHILS RELATIVE PERCENT: 63.3 %
PDW BLD-RTO: 14.1 % (ref 12.4–15.4)
PLATELET # BLD: 273 K/UL (ref 135–450)
PMV BLD AUTO: 8.9 FL (ref 5–10.5)
POTASSIUM SERPL-SCNC: 4 MMOL/L (ref 3.5–5.1)
RBC # BLD: 4.9 M/UL (ref 4–5.2)
SODIUM BLD-SCNC: 146 MMOL/L (ref 136–145)
TOTAL CK: 95 U/L (ref 26–192)
TOTAL PROTEIN: 7.2 G/DL (ref 6.4–8.2)
TRIGL SERPL-MCNC: 759 MG/DL (ref 0–150)
URIC ACID, SERUM: 7.2 MG/DL (ref 2.6–6)
VLDLC SERPL CALC-MCNC: ABNORMAL MG/DL
WBC # BLD: 8.4 K/UL (ref 4–11)

## 2019-10-16 PROCEDURE — 90686 IIV4 VACC NO PRSV 0.5 ML IM: CPT | Performed by: INTERNAL MEDICINE

## 2019-10-16 PROCEDURE — 90471 IMMUNIZATION ADMIN: CPT | Performed by: INTERNAL MEDICINE

## 2019-10-17 LAB
ESTIMATED AVERAGE GLUCOSE: 108.3 MG/DL
HBA1C MFR BLD: 5.4 %

## 2019-11-04 ENCOUNTER — OFFICE VISIT (OUTPATIENT)
Dept: INTERNAL MEDICINE CLINIC | Age: 59
End: 2019-11-04
Payer: COMMERCIAL

## 2019-11-04 VITALS
HEART RATE: 60 BPM | BODY MASS INDEX: 30.2 KG/M2 | WEIGHT: 167.8 LBS | DIASTOLIC BLOOD PRESSURE: 90 MMHG | SYSTOLIC BLOOD PRESSURE: 130 MMHG

## 2019-11-04 DIAGNOSIS — I10 ESSENTIAL HYPERTENSION: Primary | ICD-10-CM

## 2019-11-04 DIAGNOSIS — E79.0 HYPERURICEMIA: ICD-10-CM

## 2019-11-04 DIAGNOSIS — E78.2 MIXED HYPERLIPIDEMIA: ICD-10-CM

## 2019-11-04 DIAGNOSIS — E88.09 HYPERALBUMINEMIA: Primary | ICD-10-CM

## 2019-11-04 DIAGNOSIS — E88.09 HYPERALBUMINEMIA: ICD-10-CM

## 2019-11-04 DIAGNOSIS — M54.50 CHRONIC MIDLINE LOW BACK PAIN WITHOUT SCIATICA: ICD-10-CM

## 2019-11-04 DIAGNOSIS — M19.90 ARTHRITIS: ICD-10-CM

## 2019-11-04 DIAGNOSIS — I10 ESSENTIAL HYPERTENSION: ICD-10-CM

## 2019-11-04 DIAGNOSIS — M79.7 FIBROMYALGIA: ICD-10-CM

## 2019-11-04 DIAGNOSIS — G89.29 CHRONIC MIDLINE LOW BACK PAIN WITHOUT SCIATICA: ICD-10-CM

## 2019-11-04 LAB
C-REACTIVE PROTEIN: 6 MG/L (ref 0–5.1)
SEDIMENTATION RATE, ERYTHROCYTE: 10 MM/HR (ref 0–30)

## 2019-11-04 PROCEDURE — 99214 OFFICE O/P EST MOD 30 MIN: CPT | Performed by: INTERNAL MEDICINE

## 2019-11-04 RX ORDER — ALLOPURINOL 100 MG/1
100 TABLET ORAL DAILY
Qty: 90 TABLET | Refills: 3 | Status: SHIPPED | OUTPATIENT
Start: 2019-11-04 | End: 2021-01-14

## 2019-11-04 RX ORDER — HYDROCODONE BITARTRATE AND ACETAMINOPHEN 5; 325 MG/1; MG/1
1 TABLET ORAL 2 TIMES DAILY PRN
Qty: 60 TABLET | Refills: 0 | Status: SHIPPED | OUTPATIENT
Start: 2019-11-04 | End: 2019-12-09 | Stop reason: SDUPTHER

## 2019-11-04 RX ORDER — DILTIAZEM HYDROCHLORIDE 240 MG/1
240 CAPSULE, COATED, EXTENDED RELEASE ORAL DAILY
Qty: 90 CAPSULE | Refills: 3 | Status: SHIPPED | OUTPATIENT
Start: 2019-11-04 | End: 2020-10-26

## 2019-11-06 LAB
ALBUMIN SERPL-MCNC: 3.8 G/DL (ref 3.1–4.9)
ALPHA-1-GLOBULIN: 0.2 G/DL (ref 0.2–0.4)
ALPHA-2-GLOBULIN: 0.9 G/DL (ref 0.4–1.1)
BETA GLOBULIN: 1.2 G/DL (ref 0.9–1.6)
GAMMA GLOBULIN: 1 G/DL (ref 0.6–1.8)
SPE/IFE INTERPRETATION: NORMAL
TOTAL PROTEIN: 7.1 G/DL (ref 6.4–8.2)

## 2019-11-29 ENCOUNTER — HOSPITAL ENCOUNTER (OUTPATIENT)
Age: 59
Discharge: HOME | End: 2019-11-29
Payer: COMMERCIAL

## 2019-11-29 DIAGNOSIS — I10 ESSENTIAL HYPERTENSION: ICD-10-CM

## 2019-11-29 DIAGNOSIS — E79.0 HYPERURICEMIA: ICD-10-CM

## 2019-11-29 DIAGNOSIS — E78.2 MIXED HYPERLIPIDEMIA: ICD-10-CM

## 2019-11-29 DIAGNOSIS — E88.09 HYPERALBUMINEMIA: ICD-10-CM

## 2019-11-29 LAB
ALBUMIN SERPL-MCNC: 4.7 GM/DL (ref 3.4–5)
ALP BLD-CCNC: 72 IU/L (ref 40–129)
ALT SERPL-CCNC: 28 U/L (ref 10–40)
ANION GAP SERPL CALCULATED.3IONS-SCNC: 13 MMOL/L (ref 4–16)
AST SERPL-CCNC: 23 IU/L (ref 15–37)
BASOPHILS ABSOLUTE: 0 K/CU MM
BASOPHILS RELATIVE PERCENT: 0.3 % (ref 0–1)
BILIRUB SERPL-MCNC: 0.4 MG/DL (ref 0–1)
BILIRUBIN DIRECT: 0.2 MG/DL (ref 0–0.3)
BILIRUBIN, INDIRECT: 0.2 MG/DL (ref 0–0.7)
BUN BLDV-MCNC: 11 MG/DL (ref 6–23)
CALCIUM SERPL-MCNC: 9.9 MG/DL (ref 8.3–10.6)
CHLORIDE BLD-SCNC: 100 MMOL/L (ref 99–110)
CHOLESTEROL: 185 MG/DL
CO2: 27 MMOL/L (ref 21–32)
CREAT SERPL-MCNC: 0.7 MG/DL (ref 0.6–1.1)
DIFFERENTIAL TYPE: ABNORMAL
EOSINOPHILS ABSOLUTE: 0.1 K/CU MM
EOSINOPHILS RELATIVE PERCENT: 1.6 % (ref 0–3)
GFR AFRICAN AMERICAN: >60 ML/MIN/1.73M2
GFR NON-AFRICAN AMERICAN: >60 ML/MIN/1.73M2
GLUCOSE BLD-MCNC: 110 MG/DL (ref 70–99)
HCT VFR BLD CALC: 43.8 % (ref 37–47)
HDLC SERPL-MCNC: 34 MG/DL
HEMOGLOBIN: 14.2 GM/DL (ref 12.5–16)
IMMATURE NEUTROPHIL %: 0.4 % (ref 0–0.43)
LDL CHOLESTEROL DIRECT: 111 MG/DL
LYMPHOCYTES ABSOLUTE: 2.4 K/CU MM
LYMPHOCYTES RELATIVE PERCENT: 32.3 % (ref 24–44)
MCH RBC QN AUTO: 27.8 PG (ref 27–31)
MCHC RBC AUTO-ENTMCNC: 32.4 % (ref 32–36)
MCV RBC AUTO: 85.9 FL (ref 78–100)
MONOCYTES ABSOLUTE: 0.6 K/CU MM
MONOCYTES RELATIVE PERCENT: 7.6 % (ref 0–4)
NUCLEATED RBC %: 0 %
PDW BLD-RTO: 13.5 % (ref 11.7–14.9)
PLATELET # BLD: 291 K/CU MM (ref 140–440)
PMV BLD AUTO: 10.3 FL (ref 7.5–11.1)
POTASSIUM SERPL-SCNC: 3.8 MMOL/L (ref 3.5–5.1)
RBC # BLD: 5.1 M/CU MM (ref 4.2–5.4)
SEGMENTED NEUTROPHILS ABSOLUTE COUNT: 4.3 K/CU MM
SEGMENTED NEUTROPHILS RELATIVE PERCENT: 57.8 % (ref 36–66)
SODIUM BLD-SCNC: 140 MMOL/L (ref 135–145)
TOTAL IMMATURE NEUTOROPHIL: 0.03 K/CU MM
TOTAL NUCLEATED RBC: 0 K/CU MM
TOTAL PROTEIN: 7.3 GM/DL (ref 6.4–8.2)
TRIGL SERPL-MCNC: 346 MG/DL
URIC ACID: 5.8 MG/DL (ref 2.6–6)
WBC # BLD: 7.5 K/CU MM (ref 4–10.5)

## 2019-11-29 PROCEDURE — 36415 COLL VENOUS BLD VENIPUNCTURE: CPT

## 2019-11-29 PROCEDURE — 80061 LIPID PANEL: CPT

## 2019-11-29 PROCEDURE — 83721 ASSAY OF BLOOD LIPOPROTEIN: CPT

## 2019-11-29 PROCEDURE — 84550 ASSAY OF BLOOD/URIC ACID: CPT

## 2019-11-29 PROCEDURE — 80053 COMPREHEN METABOLIC PANEL: CPT

## 2019-11-29 PROCEDURE — 82248 BILIRUBIN DIRECT: CPT

## 2019-11-29 PROCEDURE — 85025 COMPLETE CBC W/AUTO DIFF WBC: CPT

## 2019-12-09 ENCOUNTER — OFFICE VISIT (OUTPATIENT)
Dept: INTERNAL MEDICINE CLINIC | Age: 59
End: 2019-12-09
Payer: COMMERCIAL

## 2019-12-09 VITALS
WEIGHT: 169 LBS | SYSTOLIC BLOOD PRESSURE: 150 MMHG | BODY MASS INDEX: 30.42 KG/M2 | HEART RATE: 88 BPM | DIASTOLIC BLOOD PRESSURE: 90 MMHG

## 2019-12-09 DIAGNOSIS — M54.50 CHRONIC MIDLINE LOW BACK PAIN WITHOUT SCIATICA: ICD-10-CM

## 2019-12-09 DIAGNOSIS — E79.0 HYPERURICEMIA: ICD-10-CM

## 2019-12-09 DIAGNOSIS — E78.2 MIXED HYPERLIPIDEMIA: ICD-10-CM

## 2019-12-09 DIAGNOSIS — M79.7 FIBROMYALGIA: ICD-10-CM

## 2019-12-09 DIAGNOSIS — G89.29 CHRONIC MIDLINE LOW BACK PAIN WITHOUT SCIATICA: ICD-10-CM

## 2019-12-09 DIAGNOSIS — I10 ESSENTIAL HYPERTENSION: Primary | ICD-10-CM

## 2019-12-09 PROCEDURE — 99213 OFFICE O/P EST LOW 20 MIN: CPT | Performed by: INTERNAL MEDICINE

## 2019-12-09 RX ORDER — OMEPRAZOLE 20 MG/1
20 CAPSULE, DELAYED RELEASE ORAL DAILY
Qty: 90 CAPSULE | Refills: 3 | Status: SHIPPED | OUTPATIENT
Start: 2019-12-09 | End: 2021-04-01 | Stop reason: SDUPTHER

## 2019-12-09 RX ORDER — HYDROCODONE BITARTRATE AND ACETAMINOPHEN 5; 325 MG/1; MG/1
1 TABLET ORAL 2 TIMES DAILY PRN
Qty: 60 TABLET | Refills: 0 | Status: SHIPPED | OUTPATIENT
Start: 2019-12-09 | End: 2020-01-28 | Stop reason: SDUPTHER

## 2019-12-17 ENCOUNTER — TELEPHONE (OUTPATIENT)
Dept: INTERNAL MEDICINE CLINIC | Age: 59
End: 2019-12-17

## 2019-12-17 RX ORDER — FAMCICLOVIR 500 MG/1
TABLET, FILM COATED ORAL
Qty: 6 TABLET | Refills: 2 | Status: SHIPPED | OUTPATIENT
Start: 2019-12-17 | End: 2022-02-07 | Stop reason: SDUPTHER

## 2020-01-09 RX ORDER — HYDROCODONE BITARTRATE AND ACETAMINOPHEN 5; 325 MG/1; MG/1
1 TABLET ORAL 2 TIMES DAILY PRN
Qty: 60 TABLET | Refills: 0 | OUTPATIENT
Start: 2020-01-09 | End: 2020-02-08

## 2020-01-28 ENCOUNTER — OFFICE VISIT (OUTPATIENT)
Dept: INTERNAL MEDICINE CLINIC | Age: 60
End: 2020-01-28
Payer: COMMERCIAL

## 2020-01-28 VITALS
HEART RATE: 93 BPM | OXYGEN SATURATION: 98 % | SYSTOLIC BLOOD PRESSURE: 160 MMHG | BODY MASS INDEX: 30.2 KG/M2 | WEIGHT: 167.8 LBS | DIASTOLIC BLOOD PRESSURE: 84 MMHG

## 2020-01-28 PROBLEM — J01.00 ACUTE MAXILLARY SINUSITIS: Status: RESOLVED | Noted: 2017-05-23 | Resolved: 2020-01-28

## 2020-01-28 PROCEDURE — 99204 OFFICE O/P NEW MOD 45 MIN: CPT | Performed by: FAMILY MEDICINE

## 2020-01-28 RX ORDER — HYDROCODONE BITARTRATE AND ACETAMINOPHEN 5; 325 MG/1; MG/1
1 TABLET ORAL 2 TIMES DAILY PRN
Qty: 60 TABLET | Refills: 0 | Status: SHIPPED | OUTPATIENT
Start: 2020-01-28 | End: 2020-03-09 | Stop reason: SDUPTHER

## 2020-01-28 ASSESSMENT — PATIENT HEALTH QUESTIONNAIRE - PHQ9
SUM OF ALL RESPONSES TO PHQ QUESTIONS 1-9: 0
2. FEELING DOWN, DEPRESSED OR HOPELESS: 0
1. LITTLE INTEREST OR PLEASURE IN DOING THINGS: 0
SUM OF ALL RESPONSES TO PHQ9 QUESTIONS 1 & 2: 0
SUM OF ALL RESPONSES TO PHQ QUESTIONS 1-9: 0

## 2020-01-28 NOTE — PROGRESS NOTES
Patient also complaining about bilateral chronic foot pain. Pain is aching character, continuous, mild to moderate intensity, worse with walking and stepping. Pain is better with Norco. She has developed pain involving all of her metatarsal heads. She is having difficulty walking. Patient also completed EMG studies with Dr. Terrance Yañez. Discontinuation plan: I have extensive discussion with the patient as there is no clear evidence of her complain in her foot and back pain. I am refilling patient medication, Norco, with the understanding that patient will return in 1 month for refill and renewed discussion about tapering of Norco.    Hyperuricemia: Multiple joint pain complaint and currently taking allopurinol 100 mg once daily. Patient last uric acid was 5.8. GERD: Patient is currently taking omeprazole 20 mg once daily. Patient denies having any GERD symptoms at this time. Patient Active Problem List   Diagnosis    Essential hypertension    Mixed hyperlipidemia    PSVT (paroxysmal supraventricular tachycardia) (HCC)    Raynaud disease    Vitamin D deficiency    Sleep apnea    Gout of multiple sites    Hordeolum externum of left upper eyelid    Acute recurrent maxillary sinusitis    Chronic pain of both feet       Past Medical History:   Diagnosis Date    Fatty liver     by US    Fibromyalgia     GERD (gastroesophageal reflux disease)     by UGI    Gout     Hyperlipidemia     Hypertension     Hypoglycemia     IBS (irritable bowel syndrome)     Prolonged emergence from general anesthesia     PSVT (paroxysmal supraventricular tachycardia) (HCC)     s/p ablation        Social History     Tobacco Use    Smoking status: Never Smoker    Smokeless tobacco: Never Used   Substance Use Topics    Alcohol use: Yes     Comment: occasionally        Review of Systems   Constitutional: Negative for appetite change, chills, fatigue, fever and unexpected weight change.    HENT: Negative for congestion, ear pain, sinus pain, sore throat and trouble swallowing. Eyes: Negative for redness and itching. Respiratory: Negative for cough, chest tightness, shortness of breath and wheezing. Cardiovascular: Negative for chest pain and palpitations. Gastrointestinal: Negative for abdominal distention, abdominal pain, constipation, diarrhea, nausea and vomiting. Endocrine: Negative for polyuria. Genitourinary: Negative for difficulty urinating, dysuria, frequency and urgency. Musculoskeletal: Positive for arthralgias, back pain and myalgias. Skin: Negative for rash. Neurological: Negative for dizziness and headaches. Psychiatric/Behavioral: Positive for sleep disturbance. Negative for behavioral problems, confusion and suicidal ideas. The patient is nervous/anxious. Objective:      BP (!) 160/84   Pulse 93   Wt 167 lb 12.8 oz (76.1 kg)   SpO2 98%   BMI 30.20 kg/m²      Physical Exam  Vitals signs reviewed. Constitutional:       Appearance: Normal appearance. She is well-developed. HENT:      Head: Normocephalic and atraumatic. Mouth/Throat:      Mouth: Mucous membranes are moist.      Pharynx: Oropharynx is clear. Eyes:      Conjunctiva/sclera: Conjunctivae normal.      Pupils: Pupils are equal, round, and reactive to light. Neck:      Musculoskeletal: Normal range of motion and neck supple. Thyroid: No thyromegaly. Cardiovascular:      Rate and Rhythm: Normal rate and regular rhythm. Heart sounds: Normal heart sounds. Pulmonary:      Effort: Pulmonary effort is normal.      Breath sounds: Normal breath sounds. Abdominal:      General: Bowel sounds are normal. There is no distension. Palpations: Abdomen is soft. Tenderness: There is no abdominal tenderness. There is no guarding. Musculoskeletal:      Right lower leg: No edema. Left lower leg: No edema.       Comments: 5-5 muscular strength throughout and bilateral.  Bilateral lumbar spinal tenderness  Leg length discrepancy  Unable to find any physical findings except for multiple metatarsal head tenderness of bilateral foot during examination   Skin:     General: Skin is warm and dry. Neurological:      General: No focal deficit present. Mental Status: She is alert and oriented to person, place, and time. Psychiatric:         Mood and Affect: Mood normal.         Behavior: Behavior normal.         Thought Content: Thought content normal.         Judgment: Judgment normal.            Assessment / Plan:      1. Chronic midline low back pain without sciatica  - Filling out patient medication and patient agreed to below discontinuation plan. - May need to repeat patient x-ray or MRI of back. - Patient return in 1 month. Discontinuation plan: I have extensive discussion with the patient as there is no clear evidence of her complain in her foot and back pain. I am refilling patient medication, Norco, with the understanding that patient will return in 1 month for refill and renewed discussion about tapering of Norco.  - HYDROcodone-acetaminophen (NORCO) 5-325 MG per tablet; Take 1 tablet by mouth 2 times daily as needed for Pain (back pain) for up to 30 days. Dispense: 60 tablet; Refill: 0    2. Chronic pain of both feet  - Discussed the case with Dr. Gagandeep Barrera who also of the opinion that patient does not need to be on Norco for her foot pain. 3. Chronic gout of multiple sites, unspecified cause  -Stable on allopurinol 100 mg once daily. Patient last uric acid level was 5.8.    4. PSVT (paroxysmal supraventricular tachycardia) (HCC)  -Stable without any symptoms. Patient currently taking diltiazem 240 mg once daily.  - Patient is not on any anticoagulant. 5. Essential hypertension  - Stable without any medication. 6. Mixed hyperlipidemia  - Patient is currently taking was Vascepa. 7. Encounter to establish care with new doctor  - Stable and fair health.           Note: Patient understand the assessment and agreed to the plan. Medication side effects was discussed during the encounter. Before discharging the patient, all questions and concern were addressed. After visit summary was provided. Advice to call clinic or me for any further questions or concern.        Maulik Fraga, DO

## 2020-02-01 PROBLEM — G89.29 CHRONIC PAIN OF BOTH FEET: Status: ACTIVE | Noted: 2020-02-01

## 2020-02-01 PROBLEM — M79.672 CHRONIC PAIN OF BOTH FEET: Status: ACTIVE | Noted: 2020-02-01

## 2020-02-01 PROBLEM — H10.32 ACUTE BACTERIAL CONJUNCTIVITIS OF LEFT EYE: Status: RESOLVED | Noted: 2017-05-23 | Resolved: 2020-02-01

## 2020-02-01 PROBLEM — M79.671 CHRONIC PAIN OF BOTH FEET: Status: ACTIVE | Noted: 2020-02-01

## 2020-02-01 ASSESSMENT — ENCOUNTER SYMPTOMS
NAUSEA: 0
CONSTIPATION: 0
EYE REDNESS: 0
COUGH: 0
CHEST TIGHTNESS: 0
ABDOMINAL PAIN: 0
SINUS PAIN: 0
TROUBLE SWALLOWING: 0
ABDOMINAL DISTENTION: 0
VOMITING: 0
EYE ITCHING: 0
SHORTNESS OF BREATH: 0
DIARRHEA: 0
SORE THROAT: 0
BACK PAIN: 1
WHEEZING: 0

## 2020-02-04 ENCOUNTER — OFFICE VISIT (OUTPATIENT)
Dept: FAMILY MEDICINE CLINIC | Age: 60
End: 2020-02-04
Payer: COMMERCIAL

## 2020-02-04 VITALS
DIASTOLIC BLOOD PRESSURE: 102 MMHG | HEART RATE: 80 BPM | OXYGEN SATURATION: 97 % | BODY MASS INDEX: 31.1 KG/M2 | SYSTOLIC BLOOD PRESSURE: 170 MMHG | HEIGHT: 62 IN | TEMPERATURE: 98.1 F | WEIGHT: 169 LBS

## 2020-02-04 PROCEDURE — 99213 OFFICE O/P EST LOW 20 MIN: CPT | Performed by: NURSE PRACTITIONER

## 2020-02-04 RX ORDER — LISINOPRIL 10 MG/1
10 TABLET ORAL DAILY
Qty: 30 TABLET | Refills: 0 | Status: SHIPPED | OUTPATIENT
Start: 2020-02-04 | End: 2020-11-03 | Stop reason: ALTCHOICE

## 2020-02-04 NOTE — PROGRESS NOTES
Ike Elena   62 y.o.  female  [id number]      Chief Complaint   Patient presents with    Hypertension     c/o elevated b/p        Subjective:  62 y. o.female is here for a follow up. She has the following chronic/acute medical problems:  Patient Active Problem List   Diagnosis    Essential hypertension    Mixed hyperlipidemia    PSVT [facility]parox[facility]mal [facility]ricular tachycardia) ([facility])    Raynaud disease    Vitamin D deficiency    Sleep apnea    Gout of multiple sites    Hordeolum externum of left upper eyelid    Acute recurrent maxillary sinusitis    Chronic pain of both feet    GERD (gastroesophageal reflux disease)    Hypertriglyceridemia       Patient is here with complaints of high blood pressure. Patient states in December diltiazam was increased 240 mg. She states it was high in January and nothing was done. Patient states she was trying to find a doctor today and when she was at that office it was found be 150/100 and then later 170/110. She states she called Dr. Millie New office and could not get in till next week. She was told by the front office staff to come here for the nurse to check it. The nurse found the blood pressure to be high and roomed her for an appointment. Review of Systems   Constitutional: Negative for appetite change, chills, fatigue and fever. HENT: Negative. Respiratory: Negative. Cardiovascular: Negative for chest pain and palpitations. Gastrointestinal: Negative for diarrhea, nausea and vomiting. Skin: Negative for rash. Neurological: Negative for dizziness, light-headedness and headaches. Current Outpatient Medications   Medication Sig Dispense Refill    lisinopril (PRINIVIL;ZESTRIL) 10 MG tablet Take 1 tablet by mouth daily 30 tablet 0    HYDROcodone-acetaminophen (NORCO) 5-325 MG per tablet Take 1 tablet by mouth 2 times daily as needed for Pain (back pain) for up to 30 days.  60 tablet 0    famciclovir (FAMVIR) 500 MG tablet Take 3 tablets as a

## 2020-02-13 ENCOUNTER — OFFICE VISIT (OUTPATIENT)
Dept: INTERNAL MEDICINE CLINIC | Age: 60
End: 2020-02-13
Payer: COMMERCIAL

## 2020-02-13 VITALS
BODY MASS INDEX: 30.18 KG/M2 | HEART RATE: 80 BPM | RESPIRATION RATE: 16 BRPM | SYSTOLIC BLOOD PRESSURE: 132 MMHG | DIASTOLIC BLOOD PRESSURE: 80 MMHG | WEIGHT: 165 LBS

## 2020-02-13 PROBLEM — E78.1 HYPERTRIGLYCERIDEMIA: Status: ACTIVE | Noted: 2020-02-13

## 2020-02-13 PROBLEM — K21.9 GERD (GASTROESOPHAGEAL REFLUX DISEASE): Status: ACTIVE | Noted: 2020-02-13

## 2020-02-13 PROCEDURE — 99214 OFFICE O/P EST MOD 30 MIN: CPT | Performed by: FAMILY MEDICINE

## 2020-02-13 ASSESSMENT — ENCOUNTER SYMPTOMS
VOMITING: 0
CONSTIPATION: 0
CHEST TIGHTNESS: 0
ABDOMINAL PAIN: 0
SORE THROAT: 0
DIARRHEA: 0
SHORTNESS OF BREATH: 0
COLOR CHANGE: 0

## 2020-02-13 NOTE — PROGRESS NOTES
Subjective:      Chief Complaint   Patient presents with   Breanna Hathaway Doctor     Former Dr Parley Bamberger Dr Debbra Officer patient    Hypertension       HPI:  David Maya is a 62 y.o. female who presents today for BP follow up. States it was high at a different office (170's/90's), was started on lisinopril. Came here for follow up with another provider and was high again. Does check at home, readings are consistently 130's/70-80's. Thought BP was still high because she feels it should be 110/60. Has cardiology appointment next week. Does not need refills today, but wanted to discuss norco use. Has been taking for many years for pain in her feet. Takes as needed. Is nervous starting with new provider due to history of narcotic use. Has some intermittent mild upper abdominal discomfort- has been told it is due to stress. Has tried prilosec but only for a few days at a time. States she has appointment for her annual physical in a few months and would like all of her labs completed.       Past Medical History:   Diagnosis Date    Fatty liver     by US    Fibromyalgia     GERD (gastroesophageal reflux disease)     by UGI    Gout     Hyperlipidemia     Hypertension     Hypoglycemia     IBS (irritable bowel syndrome)     Prolonged emergence from general anesthesia     PSVT (paroxysmal supraventricular tachycardia) (HCC)     s/p ablation        Past Surgical History:   Procedure Laterality Date    ABDOMEN SURGERY       Cholecystectomy    ABDOMEN SURGERY      Rt ovarian adhesions removed and cervical laser therapy/Dr Belinda Rojas ABDOMEN SURGERY      Rt ovarian cyst removed    ABLATION OF DYSRHYTHMIC FOCUS      APPENDECTOMY      BONE CYST EXCISION  1980    Left bone cyst aneurysm removed    CHOLECYSTECTOMY      COLONOSCOPY  1/19/2016    diverticulosis    FOOT NEUROMA SURGERY Right     TONSILLECTOMY         Social History     Tobacco Use    Smoking status: Never Smoker    Smokeless Panel; Future  - CBC Auto Differential; Future    2. General medical exam  Up to date on screening. 3. Vitamin D deficiency  - Vitamin D 25 Hydroxy; Future    4. Chronic gout of multiple sites, unspecified cause  - URIC ACID; Future    5. Restless leg syndrome  Non specific evening symptoms in her feet- possibly RLS. Will check iron levels and supplement if low. Can try treating if symptoms persist/worsen. - IRON AND TIBC; Future    6. Gastroesophageal reflux disease, esophagitis presence not specified  Discussed that if she is having reflux symptoms, she should try taking prilosec for at least 14 days. Recommended taking daily until her next appointment to see whether it prevents/improves her abdominal symptoms. 7. Hypertriglyceridemia  History of severe hypertriglyceridemia. Currently in 300's (low for her). States she cannot tolerate statins due to GI symptoms, is currently taking tripilix, co q10 and vascepa. Will monitor.  - Lipid Panel; Future          Patient voiced understanding and agreement with plan. All questions/concerns were addressed, risks/side effects of medications were reviewed. Return precautions and after visit summary were provided. No follow-ups on file. or earlier as needed.       Jeni Fowler MD

## 2020-02-18 ASSESSMENT — ENCOUNTER SYMPTOMS
VOMITING: 0
NAUSEA: 0
RESPIRATORY NEGATIVE: 1
DIARRHEA: 0

## 2020-03-09 ENCOUNTER — OFFICE VISIT (OUTPATIENT)
Dept: INTERNAL MEDICINE CLINIC | Age: 60
End: 2020-03-09
Payer: COMMERCIAL

## 2020-03-09 VITALS
OXYGEN SATURATION: 98 % | DIASTOLIC BLOOD PRESSURE: 98 MMHG | SYSTOLIC BLOOD PRESSURE: 135 MMHG | RESPIRATION RATE: 14 BRPM | WEIGHT: 166 LBS | BODY MASS INDEX: 30.36 KG/M2 | HEART RATE: 76 BPM

## 2020-03-09 PROCEDURE — 99214 OFFICE O/P EST MOD 30 MIN: CPT | Performed by: FAMILY MEDICINE

## 2020-03-09 RX ORDER — HYDROCODONE BITARTRATE AND ACETAMINOPHEN 5; 325 MG/1; MG/1
1 TABLET ORAL 2 TIMES DAILY PRN
Qty: 60 TABLET | Refills: 0 | Status: SHIPPED | OUTPATIENT
Start: 2020-03-09 | End: 2020-05-04 | Stop reason: SDUPTHER

## 2020-03-09 ASSESSMENT — ENCOUNTER SYMPTOMS
CHEST TIGHTNESS: 0
SHORTNESS OF BREATH: 0
ABDOMINAL PAIN: 0
COLOR CHANGE: 0
DIARRHEA: 0
VOMITING: 0
SORE THROAT: 0
CONSTIPATION: 0

## 2020-03-09 NOTE — PROGRESS NOTES
Subjective:      Chief Complaint   Patient presents with    Gout     pain has increased, pt would like samples of Colchicine to take with the Allopurinol as she had used this in the past       HPI:  Chris Kim is a 62 y.o. female who presents today to follow up for foot pain. Has had chronic foot pain for several years, but also has gout, which she states started flaring about 4 days ago. States that she was diagnosed several years ago with gout based upon symptoms and uric acid levels (never had joint aspiration). States that although she has baseline pain in her feet, her gout pain is different- feels like broken glass. Does not have the typical swelling and usually not localized to one joint. Pain is diffuse throughout all of her toes, is tender to touch, sometimes gets red. States she has flares once every few months. Has been on uloric in the past, but did not do well with it. Started having bilateral foot pain >10 years ago. No injury or trauma. Has been through extensive evaluation. Has seen podiatry, ortho, gotten XR, MRI. Has also tried acupuncture, pain management and is currently seeing a kinesiologist.  Has had multiple EMGs, was told she did not have neuropathy. Has tried tylenol, NSAIDs (can't tolerate), gabapentin, lyrica, muscle relaxants, etc.  Orthopedic surgeon started her on norco about a year ago which has been keeping symptoms tolerable so that she can continue to work. Needing refill today. States she tries not to use both doses if she can manage without it. Stopped taking lisinopril because she ran out of medication a few months ago, has upcoming appointment with cardiology. States BP readings at home are 120's/70's.         Past Medical History:   Diagnosis Date    Fatty liver     by US    Fibromyalgia     GERD (gastroesophageal reflux disease)     by UGI    Gout     Hyperlipidemia     Hypertension     Hypoglycemia     IBS (irritable bowel syndrome)  Prolonged emergence from general anesthesia     PSVT (paroxysmal supraventricular tachycardia) ([facility] [address]     s/p ablation        Past Surgical History:   Procedure Laterality Date    ABDOMEN SURGERY       Cholecystectomy    ABDOMEN SURGERY      Rt ovarian adhesions removed and cervical laser therapy/Dr Nunez Butt ABDOMEN SURGERY      Rt ovarian cyst removed    ABLATION OF DYSRHYTHMIC FOCUS      APPENDECTOMY      BONE CYST EXCISION  1980    Left bone cyst aneurysm removed    CHOLECYSTECTOMY      COLONOSCOPY  1/19/2016    diverticulosis    FOOT NEUROMA SURGERY Right     TONSILLECTOMY         Social History     Tobacco Use    Smoking status: Never Smoker    Smokeless tobacco: Never Used   Substance Use Topics    Alcohol use: Yes     Comment: occasionally        Review of Systems   Constitutional: Negative for activity change, appetite change, chills, fever and unexpected weight change. HENT: Negative for congestion and sore throat. Respiratory: Negative for chest tightness and shortness of breath. Cardiovascular: Negative for chest pain and palpitations. Gastrointestinal: Negative for abdominal pain, constipation, diarrhea and vomiting. Genitourinary: Negative for dysuria. Musculoskeletal: Positive for arthralgias. Negative for joint swelling. Skin: Negative for color change. Neurological: Negative for dizziness and light-headedness. Psychiatric/Behavioral: Negative for dysphoric mood. The patient is not nervous/anxious. Prior to Visit Medications    Medication Sig Taking?  Authorizing Provider   lisinopril (PRINIVIL;ZESTRIL) 10 MG tablet Take 1 tablet by mouth daily Yes MARGOTH Herman - CNP   famciclovir (FAMVIR) 500 MG tablet Take 3 tablets as a single dose as directed Yes Roddy Mitchell MD   omeprazole (PRILOSEC) 20 MG delayed release capsule Take 1 capsule by mouth Daily Yes Roddy Mitchell MD   choline fenofibrate (TRILIPIX) 135 MG CPDR delayed release capsule TAKE ONE CAPSULE BY MOUTH EVERY DAY Yes Nerissa Morales MD   diltiazem (CARTIA XT) 240 MG extended release capsule Take 1 capsule by mouth daily Yes Nerissa Morales MD   allopurinol (ZYLOPRIM) 100 MG tablet Take 1 tablet by mouth daily Yes Nerissa Morales MD   VASCEPA 1 g CAPS capsule Take 1 capsule by mouth 2 times daily Yes Nerissa Morales MD   Coenzyme Q10 (CO Q 10 PO) Take by mouth as needed  Yes Historical Provider, MD   Fexofenadine HCl (ALLEGRA PO) Take by mouth as needed  Yes Historical Provider, MD   Cholecalciferol (VITAMIN D3) 2000 UNITS CAPS Take  by mouth 2 times daily. Yes Historical Provider, MD   Multiple Vitamins-Minerals (THERAPEUTIC MULTIVIT/MINERAL PO) Take 1 tablet by mouth daily as needed  Yes Historical Provider, MD          Objective:      BP (!) 135/98   Pulse 76   Resp 14   Wt 166 lb (75.3 kg)   SpO2 98%   BMI 30.36 kg/m²      Physical Exam  Vitals signs and nursing note reviewed. Constitutional:       General: She is not in acute distress. Appearance: Normal appearance. She is not ill-appearing or toxic-appearing. HENT:      Head: Normocephalic and atraumatic. Right Ear: External ear normal.      Left Ear: External ear normal.      Nose: Nose normal.      Mouth/Throat:      Pharynx: Oropharynx is clear. Eyes:      General: No scleral icterus. Right eye: No discharge. Left eye: No discharge. Extraocular Movements: Extraocular movements intact. Conjunctiva/sclera: Conjunctivae normal.   Neck:      Musculoskeletal: Normal range of motion and neck supple. No neck rigidity. Cardiovascular:      Rate and Rhythm: Normal rate and regular rhythm. Heart sounds: Normal heart sounds. Pulmonary:      Effort: Pulmonary effort is normal.      Breath sounds: Normal breath sounds. No wheezing or rales. Musculoskeletal:         General: Tenderness (DIffuse tenderness distal to midfoot bilaterally; no joint swelling or erythema) present.  No

## 2020-05-05 RX ORDER — HYDROCODONE BITARTRATE AND ACETAMINOPHEN 5; 325 MG/1; MG/1
1 TABLET ORAL 2 TIMES DAILY PRN
Qty: 60 TABLET | Refills: 0 | Status: SHIPPED | OUTPATIENT
Start: 2020-05-05 | End: 2020-06-09 | Stop reason: SDUPTHER

## 2020-05-06 ENCOUNTER — HOSPITAL ENCOUNTER (OUTPATIENT)
Age: 60
Discharge: HOME | End: 2020-05-06
Payer: COMMERCIAL

## 2020-05-06 LAB
ANION GAP SERPL CALCULATED.3IONS-SCNC: 13 MMOL/L (ref 4–16)
BUN BLDV-MCNC: 12 MG/DL (ref 6–23)
CALCIUM SERPL-MCNC: 9.9 MG/DL (ref 8.3–10.6)
CHLORIDE BLD-SCNC: 102 MMOL/L (ref 99–110)
CO2: 27 MMOL/L (ref 21–32)
CREAT SERPL-MCNC: 0.7 MG/DL (ref 0.6–1.1)
GFR AFRICAN AMERICAN: >60 ML/MIN/1.73M2
GFR NON-AFRICAN AMERICAN: >60 ML/MIN/1.73M2
GLUCOSE BLD-MCNC: 97 MG/DL (ref 70–99)
POTASSIUM SERPL-SCNC: 4.5 MMOL/L (ref 3.5–5.1)
SODIUM BLD-SCNC: 142 MMOL/L (ref 135–145)

## 2020-05-06 PROCEDURE — 80048 BASIC METABOLIC PNL TOTAL CA: CPT

## 2020-05-06 PROCEDURE — 36415 COLL VENOUS BLD VENIPUNCTURE: CPT

## 2020-06-11 RX ORDER — HYDROCODONE BITARTRATE AND ACETAMINOPHEN 5; 325 MG/1; MG/1
1 TABLET ORAL 2 TIMES DAILY PRN
Qty: 60 TABLET | Refills: 0 | Status: SHIPPED | OUTPATIENT
Start: 2020-06-11 | End: 2020-07-15 | Stop reason: SDUPTHER

## 2020-07-16 RX ORDER — HYDROCODONE BITARTRATE AND ACETAMINOPHEN 5; 325 MG/1; MG/1
1 TABLET ORAL 2 TIMES DAILY PRN
Qty: 60 TABLET | Refills: 0 | Status: SHIPPED | OUTPATIENT
Start: 2020-07-16 | End: 2020-08-25 | Stop reason: SDUPTHER

## 2020-08-27 RX ORDER — HYDROCODONE BITARTRATE AND ACETAMINOPHEN 5; 325 MG/1; MG/1
1 TABLET ORAL 2 TIMES DAILY PRN
Qty: 60 TABLET | Refills: 0 | Status: SHIPPED | OUTPATIENT
Start: 2020-08-27 | End: 2020-10-07 | Stop reason: SDUPTHER

## 2020-09-30 ENCOUNTER — NURSE ONLY (OUTPATIENT)
Dept: INTERNAL MEDICINE CLINIC | Age: 60
End: 2020-09-30
Payer: COMMERCIAL

## 2020-09-30 ENCOUNTER — HOSPITAL ENCOUNTER (OUTPATIENT)
Age: 60
Discharge: HOME | End: 2020-09-30
Payer: COMMERCIAL

## 2020-09-30 VITALS — TEMPERATURE: 97.3 F

## 2020-09-30 LAB
ANION GAP SERPL CALCULATED.3IONS-SCNC: 10 MMOL/L (ref 4–16)
BASOPHILS ABSOLUTE: 0 K/CU MM
BASOPHILS RELATIVE PERCENT: 0.2 % (ref 0–1)
BUN BLDV-MCNC: 8 MG/DL (ref 6–23)
CALCIUM SERPL-MCNC: 9.6 MG/DL (ref 8.3–10.6)
CHLORIDE BLD-SCNC: 104 MMOL/L (ref 99–110)
CHOLESTEROL: 220 MG/DL
CO2: 29 MMOL/L (ref 21–32)
CREAT SERPL-MCNC: 0.7 MG/DL (ref 0.6–1.1)
DIFFERENTIAL TYPE: ABNORMAL
EOSINOPHILS ABSOLUTE: 0.2 K/CU MM
EOSINOPHILS RELATIVE PERCENT: 1.8 % (ref 0–3)
GFR AFRICAN AMERICAN: >60 ML/MIN/1.73M2
GFR NON-AFRICAN AMERICAN: >60 ML/MIN/1.73M2
GLUCOSE BLD-MCNC: 98 MG/DL (ref 70–99)
HCT VFR BLD CALC: 43.6 % (ref 37–47)
HDLC SERPL-MCNC: 30 MG/DL
HEMOGLOBIN: 14.7 GM/DL (ref 12.5–16)
IMMATURE NEUTROPHIL %: 0.7 % (ref 0–0.43)
IRON: 79 UG/DL (ref 37–145)
LDL CHOLESTEROL DIRECT: 63 MG/DL
LYMPHOCYTES ABSOLUTE: 2.8 K/CU MM
LYMPHOCYTES RELATIVE PERCENT: 32.8 % (ref 24–44)
MCH RBC QN AUTO: 29.1 PG (ref 27–31)
MCHC RBC AUTO-ENTMCNC: 33.7 % (ref 32–36)
MCV RBC AUTO: 86.2 FL (ref 78–100)
MONOCYTES ABSOLUTE: 0.6 K/CU MM
MONOCYTES RELATIVE PERCENT: 7.2 % (ref 0–4)
NUCLEATED RBC %: 0 %
PCT TRANSFERRIN: 27 % (ref 10–44)
PDW BLD-RTO: 13.2 % (ref 11.7–14.9)
PLATELET # BLD: 238 K/CU MM (ref 140–440)
PMV BLD AUTO: 10.5 FL (ref 7.5–11.1)
POTASSIUM SERPL-SCNC: 4.6 MMOL/L (ref 3.5–5.1)
RBC # BLD: 5.06 M/CU MM (ref 4.2–5.4)
SEGMENTED NEUTROPHILS ABSOLUTE COUNT: 4.9 K/CU MM
SEGMENTED NEUTROPHILS RELATIVE PERCENT: 57.3 % (ref 36–66)
SODIUM BLD-SCNC: 143 MMOL/L (ref 135–145)
TOTAL IMMATURE NEUTOROPHIL: 0.06 K/CU MM
TOTAL IRON BINDING CAPACITY: 298 UG/DL (ref 250–450)
TOTAL NUCLEATED RBC: 0 K/CU MM
TRIGL SERPL-MCNC: 1017 MG/DL
UNSATURATED IRON BINDING CAPACITY: 219 UG/DL (ref 110–370)
URIC ACID: 8.7 MG/DL (ref 2.6–6)
VITAMIN D 25-HYDROXY: 26.05 NG/ML
WBC # BLD: 8.5 K/CU MM (ref 4–10.5)

## 2020-09-30 PROCEDURE — 83550 IRON BINDING TEST: CPT

## 2020-09-30 PROCEDURE — 90686 IIV4 VACC NO PRSV 0.5 ML IM: CPT | Performed by: FAMILY MEDICINE

## 2020-09-30 PROCEDURE — 82306 VITAMIN D 25 HYDROXY: CPT

## 2020-09-30 PROCEDURE — 90471 IMMUNIZATION ADMIN: CPT | Performed by: FAMILY MEDICINE

## 2020-09-30 PROCEDURE — 80048 BASIC METABOLIC PNL TOTAL CA: CPT

## 2020-09-30 PROCEDURE — 85025 COMPLETE CBC W/AUTO DIFF WBC: CPT

## 2020-09-30 PROCEDURE — 84550 ASSAY OF BLOOD/URIC ACID: CPT

## 2020-09-30 PROCEDURE — 83721 ASSAY OF BLOOD LIPOPROTEIN: CPT

## 2020-09-30 PROCEDURE — 83540 ASSAY OF IRON: CPT

## 2020-09-30 PROCEDURE — 36415 COLL VENOUS BLD VENIPUNCTURE: CPT

## 2020-09-30 PROCEDURE — 80061 LIPID PANEL: CPT

## 2020-09-30 NOTE — PROGRESS NOTES
Have you ever had a reaction to a flu vaccine? NO  Are you able to eat eggs without adverse effects? YES  Do you have any current illness? NO  Have you ever had Guillian Garland Syndrome? NO    Flu vaccine given per order. Please see immunization tab.   Given left deltoid patient tolerated well

## 2020-10-08 ENCOUNTER — TELEPHONE (OUTPATIENT)
Dept: INTERNAL MEDICINE CLINIC | Age: 60
End: 2020-10-08

## 2020-10-08 RX ORDER — HYDROCODONE BITARTRATE AND ACETAMINOPHEN 5; 325 MG/1; MG/1
1 TABLET ORAL 2 TIMES DAILY PRN
Qty: 60 TABLET | Refills: 0 | Status: SHIPPED | OUTPATIENT
Start: 2020-10-08 | End: 2020-11-11 | Stop reason: SDUPTHER

## 2020-10-15 NOTE — TELEPHONE ENCOUNTER
Spoke with patient, is aware of results. Has long history of severe hypertriglyceridemia and has tried multiple medications. Is trying to take her current medications (vascepa, fibrate) a few days at a time as they give her GI upset. Is aware of pancreatitis risk with extremely elevated TG. Has not tried repatha or praluent. Will discuss further at appointment in 2 weeks.

## 2020-11-03 ENCOUNTER — OFFICE VISIT (OUTPATIENT)
Dept: INTERNAL MEDICINE CLINIC | Age: 60
End: 2020-11-03
Payer: COMMERCIAL

## 2020-11-03 VITALS
HEIGHT: 62 IN | OXYGEN SATURATION: 95 % | DIASTOLIC BLOOD PRESSURE: 88 MMHG | TEMPERATURE: 98.2 F | WEIGHT: 173 LBS | HEART RATE: 80 BPM | SYSTOLIC BLOOD PRESSURE: 128 MMHG | BODY MASS INDEX: 31.83 KG/M2

## 2020-11-03 PROCEDURE — 99214 OFFICE O/P EST MOD 30 MIN: CPT | Performed by: FAMILY MEDICINE

## 2020-11-03 RX ORDER — LOSARTAN POTASSIUM 50 MG/1
50 TABLET ORAL DAILY
COMMUNITY
End: 2021-02-04

## 2020-11-03 ASSESSMENT — ENCOUNTER SYMPTOMS
DIARRHEA: 0
VOMITING: 0
CONSTIPATION: 0
CHEST TIGHTNESS: 0
COLOR CHANGE: 0
SORE THROAT: 0
ABDOMINAL PAIN: 0
SHORTNESS OF BREATH: 0

## 2020-11-03 NOTE — PROGRESS NOTES
Review of Systems   Constitutional: Negative for activity change, appetite change, chills, fever and unexpected weight change. HENT: Negative for congestion and sore throat. Respiratory: Negative for chest tightness and shortness of breath. Cardiovascular: Negative for chest pain and palpitations. Gastrointestinal: Negative for abdominal pain, constipation, diarrhea and vomiting. Genitourinary: Negative for dysuria. Musculoskeletal: Positive for arthralgias. Skin: Negative for color change. Neurological: Negative for dizziness and light-headedness. Psychiatric/Behavioral: Negative for dysphoric mood. The patient is not nervous/anxious. Prior to Visit Medications    Medication Sig Taking? Authorizing Provider   dilTIAZem (CARDIZEM CD) 240 MG extended release capsule TAKE ONE (1) CAPSULE BY MOUTH ONCE DAILY Yes Jeniffer Nolan MD   HYDROcodone-acetaminophen (NORCO) 5-325 MG per tablet Take 1 tablet by mouth 2 times daily as needed for Pain (back pain) for up to 60 days. Yes Jeniffer Nolan MD   lisinopril (PRINIVIL;ZESTRIL) 10 MG tablet Take 1 tablet by mouth daily Yes MARGOTH Her CNP   famciclovir (FAMVIR) 500 MG tablet Take 3 tablets as a single dose as directed Yes Nury [place] MD   omeprazole (PRILOSEC) 20 MG delayed release capsule Take 1 capsule by mouth Daily Yes Nury [place] MD   choline fenofibrate (TRILIPIX) 135 MG CPDR delayed release capsule TAKE ONE CAPSULE BY MOUTH EVERY DAY Yes Nury [place] MD   VASCEPA 1 g CAPS capsule Take 1 capsule by mouth 2 times daily Yes Nury [place] MD   Coenzyme Q10 (CO Q 10 PO) Take by mouth as needed  Yes Historical Provider, MD   Cholecalciferol (VITAMIN D3) 2000 UNITS CAPS Take  by mouth 2 times daily.  Yes Historical Provider, MD   Multiple Vitamins-Minerals (THERAPEUTIC MULTIVIT/MINERAL PO) Take 1 tablet by mouth daily as needed  Yes Historical Provider, MD   allopurinol (ZYLOPRIM) 100 MG tablet Take 1 tablet by mouth daily  Patient not taking: Reported on 11/3/2020  Licha Aguilera MD   Fexofenadine HCl (ALLEGRA PO) Take by mouth as needed   Historical Provider, MD          Objective:      /88   Pulse 80   Temp 98.2 °F (36.8 °C)   Ht 5' 2\" (1.575 m)   Wt 173 lb (78.5 kg)   SpO2 95%   Breastfeeding No   BMI 31.64 kg/m²      Physical Exam  Vitals signs and nursing note reviewed. Constitutional:       General: She is not in acute distress. Appearance: Normal appearance. She is not ill-appearing or toxic-appearing. HENT:      Head: Normocephalic and atraumatic. Right Ear: External ear normal.      Left Ear: External ear normal.      Nose: Nose normal.      Mouth/Throat:      Pharynx: Oropharynx is clear. Eyes:      General: No scleral icterus. Right eye: No discharge. Left eye: No discharge. Extraocular Movements: Extraocular movements intact. Conjunctiva/sclera: Conjunctivae normal.   Neck:      Musculoskeletal: Normal range of motion and neck supple. No neck rigidity. Cardiovascular:      Rate and Rhythm: Normal rate and regular rhythm. Heart sounds: Normal heart sounds. Pulmonary:      Effort: Pulmonary effort is normal.      Breath sounds: Normal breath sounds. No wheezing or rales. Musculoskeletal:         General: No deformity. Skin:     General: Skin is warm and dry. Findings: No rash. Neurological:      General: No focal deficit present. Mental Status: She is alert. Mental status is at baseline. Motor: No weakness. Psychiatric:         Mood and Affect: Mood normal.         Behavior: Behavior normal.            Assessment / Plan:      1. Hypertriglyceridemia  Severe, restarted trilipix and vascepa recently but only able to take intermittently due to GI side effects. Declining injectables. Encouraged lifestyle modifications. Will recheck labs in a few months and try to maintain TG <500 to decrease risk of pancreatitis. - Lipid Panel; Future    2. General medical exam  - CBC Auto Differential; Future  - Comprehensive Metabolic Panel; Future  - Lipid Panel; Future    3. Essential hypertension  Reports labile BP's at home and is concerned about secondary causes of HTN. BP at goal today. Already following with cardiology- advised to address at follow up appointment. If still with concerns after cardiology appointment, can try referring to Dr. Abner Campos. Continue logging BP's at home. Ask cards about renal causes of HTN, will wait on adjusting for now- can refer to Philip if cards doesn't evaluate  Monitor lipids  F/u in 3 mo with labs      Patient voiced understanding and agreement with plan. All questions/concerns were addressed, risks/side effects of medications were reviewed. Return precautions and after visit summary were provided. Return in about 3 months (around 2/3/2021). or earlier as needed.       Latonia Arnett MD

## 2020-11-12 RX ORDER — HYDROCODONE BITARTRATE AND ACETAMINOPHEN 5; 325 MG/1; MG/1
1 TABLET ORAL 2 TIMES DAILY PRN
Qty: 60 TABLET | Refills: 0 | Status: SHIPPED | OUTPATIENT
Start: 2020-11-12 | End: 2020-12-28 | Stop reason: SDUPTHER

## 2020-12-22 ENCOUNTER — E-VISIT (OUTPATIENT)
Dept: INTERNAL MEDICINE CLINIC | Age: 60
End: 2020-12-22
Payer: COMMERCIAL

## 2020-12-22 PROCEDURE — 99421 OL DIG E/M SVC 5-10 MIN: CPT | Performed by: FAMILY MEDICINE

## 2020-12-22 RX ORDER — AMOXICILLIN AND CLAVULANATE POTASSIUM 875; 125 MG/1; MG/1
1 TABLET, FILM COATED ORAL 2 TIMES DAILY
Qty: 14 TABLET | Refills: 0 | Status: SHIPPED | OUTPATIENT
Start: 2020-12-22 | End: 2020-12-29

## 2020-12-22 ASSESSMENT — LIFESTYLE VARIABLES: SMOKING_STATUS: NO, I'VE NEVER SMOKED

## 2020-12-22 NOTE — PROGRESS NOTES
Spoke with patient. States she has been having sinus pain and pressure, congestion, drainage, cough for about 5 days. Has been trying multiple over-the-counter medications include including Mucinex, nasal sprays, Allegra, Tylenol, etc. with mild improvement. Denying any fevers. States she has same symptoms every year which develops into a sinus and ear infection. Given the upcoming long weekend, will call in a course of Augmentin for presumptive sinusitis. Advised patient to try waiting another few days before starting antibiotics as sinusitis may still be viral.    Continue supportive care, contact clinic if symptoms not improved with antibiotics.

## 2020-12-28 ENCOUNTER — HOSPITAL ENCOUNTER (OUTPATIENT)
Age: 60
Setting detail: SPECIMEN
Discharge: HOME | End: 2020-12-28
Payer: COMMERCIAL

## 2020-12-28 ENCOUNTER — OFFICE VISIT (OUTPATIENT)
Dept: PRIMARY CARE CLINIC | Age: 60
End: 2020-12-28
Payer: COMMERCIAL

## 2020-12-28 VITALS — TEMPERATURE: 97.2 F | HEART RATE: 96 BPM | OXYGEN SATURATION: 99 %

## 2020-12-28 PROCEDURE — 99213 OFFICE O/P EST LOW 20 MIN: CPT | Performed by: NURSE PRACTITIONER

## 2020-12-28 PROCEDURE — U0002 COVID-19 LAB TEST NON-CDC: HCPCS

## 2020-12-28 RX ORDER — HYDROCODONE BITARTRATE AND ACETAMINOPHEN 5; 325 MG/1; MG/1
1 TABLET ORAL 2 TIMES DAILY PRN
Qty: 60 TABLET | Refills: 0 | Status: SHIPPED | OUTPATIENT
Start: 2020-12-28 | End: 2021-02-04 | Stop reason: SDUPTHER

## 2020-12-28 NOTE — PATIENT INSTRUCTIONS
Your COVID 19 test can take 3-5 days for the results come back. We ask that you make a Mychart page and view your test results this way. You will need to Self quarantine until you know your results. Increase fluids rest  Saline nasal spray as directed  Warm salt gargles for throat discomfort  Monitor temperature twice a day  Tylenol for fevers and/or discomfort. If symptoms are worse -Go to the ER. Follow up with your primary doctor    To Whom it May Concern:    Luisa Holter has been tested for COVID on 12/28/20. They may NOT return to work until their lab test results back and they been fever free for 3 days. If test is positive they must stay home for 2 weeks or until they test negative or as directed by the [facility] Department.

## 2020-12-28 NOTE — PROGRESS NOTES
12/28/20  Chriss Castañeda  1960    FLU/COVID-19 CLINIC EVALUATION    HPI SYMPTOMS:    Employer: NA    [] Fevers  [] Chills  [] Cough  [] Coughing up blood  [x] Chest Congestion  [] Nasal Congestion  [] Feeling short of breath  [] Sometimes  [] Frequently  [] All the time  [] Chest pain  [] Headaches  []Tolerable  [] Severe  [] Sore throat  [] Muscle aches  [] Nausea  [] Vomiting  []Unable to keep fluids down  [] Diarrhea  []Severe    [x] OTHER SYMPTOMS:  Loss of smell    Symptom Duration:   [] 1  [] 2   [] 3   [] 4    [] 5   [] 6   [x] 7   [] 8   [] 9   [] 10   [] 11   [] 12   [] 13   [] 14   [] Longer than 14 days    Symptom course:   [] Worsening     [x] Stable     [] Improving    RISK FACTORS:    [] Pregnant or possibly pregnant  [x] Age over 61  [] Diabetes  [x] Heart disease  [] Asthma  [] COPD/Other chronic lung diseases  [] Active Cancer  [] On Chemotherapy  [] Taking oral steroids  [] History Lymphoma/Leukemia  [] Close contact with a lab confirmed COVID-19 patient within 14 days of symptom onset  [] History of travel from affected geographical areas within 14 days of symptom onset       VITALS:  There were no vitals filed for this visit. TESTS:    POCT FLU:  [] Positive     []Negative    ASSESSMENT:    [] Flu  [] Possible COVID-19  [] Strep    PLAN:    [] Discharge home with written instructions for:  [] Flu management  [] Possible COVID-19 infection with self-quarantine and management of symptoms  [] Follow-up with primary care physician or emergency department if worsens  [] Evaluation per physician/NP/PA in clinic  [] Sent to ER       An  electronic signature was used to authenticate this note.      --Stephane Lucero LPN on [id number]/3766 at 11:42 AM
Head: Normocephalic and atraumatic. Nose: Nose normal.      Mouth/Throat:      Mouth: Mucous membranes are moist.      Pharynx: Oropharynx is clear. Eyes:      Extraocular Movements: Extraocular movements intact. Pupils: Pupils are equal, round, and reactive to light. Neck:      Musculoskeletal: Normal range of motion and neck supple. Cardiovascular:      Rate and Rhythm: Normal rate and regular rhythm. Pulmonary:      Effort: Pulmonary effort is normal. No respiratory distress. Breath sounds: Normal breath sounds. No wheezing, rhonchi or rales. Musculoskeletal: Normal range of motion. Skin:     General: Skin is warm and dry. Neurological:      Mental Status: She is alert and oriented to person, place, and time. Psychiatric:         Mood and Affect: Mood normal.         Behavior: Behavior normal.         Thought Content: Thought content normal.         Judgment: Judgment normal.         ASSESSMENT/PLAN:  1. Chest congestion  Stable  OTC symptom management, rest and fluids. Follow up with PCP if worsened symptoms or not improving. Treated by PCP with Augmentin start date 12/22/2020 and finish 12/29/2020      Vitals:    12/28/20 1158   Pulse: 96   Temp: 97.2 °F (36.2 °C)   SpO2: 99%       - COVID-19 Ambulatory      FOLLOW-UP:  Present to the ER for any emergent or acute symptoms not managed at home or in office.       In addition to other information, the printed after visit summary provided to the patient includes:  [x] COVID-19 Self care instructions  [x] COVID-19 General patient information

## 2020-12-29 LAB
SARS-COV-2: DETECTED
SOURCE: ABNORMAL

## 2020-12-30 ENCOUNTER — CARE COORDINATION (OUTPATIENT)
Dept: CASE MANAGEMENT | Age: 60
End: 2020-12-30

## 2020-12-30 NOTE — CARE COORDINATION
Comment alert noted in [facility] remote symptom monitoring program. Messaged patient to notify Priya Pineda if symptoms have worsened since yesterday. clifton, 3:50 PM   Feeling scared today luz of people I know  from Covid. Jorge Tyson, RN, 3:57 PM   Just wanted to let you know that The Get Well Team at [facility] ([facility] has received your message. Thanks for letting us know. Please let us know if any of your symptoms are worse than yesterday, or if you have any questions or concerns and would like to speak with a nurse. We are available 8-4 M-F, and 9-1 S/S. My number is [phone]      Pt called back and I was able to speak to her. She is able to speak in full sentences and she is starting to feel abetter but she is worried because she is on day 10 of having illness and her  is older and he still has a cough. Talked to her about getting a pulse ox as this may offer her more comfort if she sees an actual number. Talked to her about treating a fever, sore throat. Gave her parameters for going to the ED. Assured her she would be treated well in the ED if needed. Spoke about using the pharmacy to delivery even over the counter meds. And curb side on groceries. To continue to use Get Well to have someone to communicate with. How to treat a cough and GI upset also discussed. PCP is part time and she is worried about reaching her. She seemed to draw comfort from voicing concerns.       Jorge Tyson, KACIN, RN   [address] [place] [address] Nurse  [phone]

## 2020-12-31 ENCOUNTER — CARE COORDINATION (OUTPATIENT)
Dept: CASE MANAGEMENT | Age: 60
End: 2020-12-31

## 2020-12-31 NOTE — CARE COORDINATION
Comment alert noted in [facility] remote symptom monitoring program. Messaged patient to notify Luis Albertokatelynnnapoleon Shayy if symptoms have worsened since yesterday. clifton, 11:44 AM  He is 87     clifton, 11:46 AM  Now 86/85. Called Bisi Royal and told her if O2 cannot come above 90% he needs to go to the hospital. She stated when he laid flat it was 90.  I told her to watch him closely and if he is below 90 at rest, has difficulty breathing in that he is using accessory muscles at rest, has chest pain, bleue fingers or lips or mental status change to call [address], BSN, [address] [facility]/ [place] [address] Transition Nurse  [phone]

## 2020-12-31 NOTE — CARE COORDINATION
Comment alert noted in [facility] remote symptom monitoring program. Messaged patient to notify Tracy Kristen if symptoms have worsened since yesterday. clifton, 8:39 AM   Spoke with Mary Cruz yesterday about . This morning I Called  and requested antibiotics for symptoms of sinus infection. Isaak HAYES said to keep him elevated and on his side, get up so pneumonia does not set in. Any other suggestions? My symptoms for COVID are staying the same. Thanks     Laurel Hoffman RN, 8:43 AM   Have him take slow, deep breaths too. Were you able to get a pulse oximeter?    clifton, 9:08 AM   Pharmacy will deliver pulse ox today along with vitamins. Laurel Hoffman RN, 9:15 AM   Good! Remember above 90% while at rest. Is he able to sit and take the deep breath?      clifton, 9:29 AM   Yes, we practiced together. Hes showering, which he gets fatigued from that. Terry Mack set him up for us to both breathe slow and deep throughout the day. Thanks     Laurel Hoffman RN, 9:31 AM   Good. Sitting up straight allows the diaphragm to expand better and the lungs get better exercise and air movement    clifton, 9:35 AM   Good information. Thanks. He is typically a good patient so he will do his best. Your support has been helpful.     JEANNIE Tomas, RN   [address] [place] [address] Nurse  [phone]

## 2021-01-01 ENCOUNTER — CARE COORDINATION (OUTPATIENT)
Dept: CASE MANAGEMENT | Age: 61
End: 2021-01-01

## 2021-01-01 NOTE — CARE COORDINATION
is feeling better. Her  has O2 drop but is recovering. Advised that if he feels any distress to not hesitate to call 911. Changing positions, taking a deep breath , or coughing can make his O2 come up. Seems like he needs to loosen some congestion. Fever is 99.7 after tylenol. Advised to use lukewarm washcloths.  I told pt to to tke it slowly and listen to her body as she recovers      KACI VieiraN, RN   [address] [facility]/ [facility] [address] Transition Nurse  [phone]

## 2021-01-02 ENCOUNTER — CARE COORDINATION (OUTPATIENT)
Dept: CASE MANAGEMENT | Age: 61
End: 2021-01-02

## 2021-01-14 ENCOUNTER — VIRTUAL VISIT (OUTPATIENT)
Dept: INTERNAL MEDICINE CLINIC | Age: 61
End: 2021-01-14
Payer: COMMERCIAL

## 2021-01-14 DIAGNOSIS — U07.1 COVID-19: Primary | ICD-10-CM

## 2021-01-14 PROCEDURE — 99214 OFFICE O/P EST MOD 30 MIN: CPT | Performed by: FAMILY MEDICINE

## 2021-01-14 RX ORDER — ALBUTEROL SULFATE 90 UG/1
2 AEROSOL, METERED RESPIRATORY (INHALATION) EVERY 6 HOURS PRN
Qty: 3 INHALER | Refills: 1 | Status: SHIPPED | OUTPATIENT
Start: 2021-01-14 | End: 2022-07-14

## 2021-01-14 ASSESSMENT — PATIENT HEALTH QUESTIONNAIRE - PHQ9
SUM OF ALL RESPONSES TO PHQ9 QUESTIONS 1 & 2: 1
SUM OF ALL RESPONSES TO PHQ QUESTIONS 1-9: 1
SUM OF ALL RESPONSES TO PHQ QUESTIONS 1-9: 1

## 2021-01-14 ASSESSMENT — ENCOUNTER SYMPTOMS
SORE THROAT: 0
CONSTIPATION: 0
DIARRHEA: 0
COLOR CHANGE: 0
VOMITING: 0
CHEST TIGHTNESS: 0
SHORTNESS OF BREATH: 1
ABDOMINAL PAIN: 0

## 2021-01-14 NOTE — PROGRESS NOTES
Patience Smith (:  1960) is a 56 y.o. female,Established patient, here for evaluation of the following chief complaint(s): Check-Up (Covid, still feels short of breath)      ASSESSMENT/PLAN:  1. COVID-19  Still with some residual shortness of breath but otherwise recovering, may try albuterol for symptom control. Continue supportive care as needed. -     albuterol sulfate HFA (VENTOLIN HFA) 108 (90 Base) MCG/ACT inhaler; Inhale 2 puffs into the lungs every 6 hours as needed for Wheezing or Shortness of Breath, Disp-3 Inhaler, R-1Normal      Return in about 3 weeks (around 2021). SUBJECTIVE/OBJECTIVE:  HPI     25-year-old female presenting for follow-up after Covid. States she was diagnosed on . Is still having some residual shortness of breath but otherwise feeling better. Started taking a baby ASA because she was advised to do so by her pharmacist.  Otherwise no changes to medications. No acute concerns today. Review of Systems   Constitutional: Positive for fatigue. Negative for activity change, appetite change, chills, fever and unexpected weight change. HENT: Negative for congestion and sore throat. Respiratory: Positive for shortness of breath. Negative for chest tightness. Cardiovascular: Negative for chest pain and palpitations. Gastrointestinal: Negative for abdominal pain, constipation, diarrhea and vomiting. Genitourinary: Negative for dysuria. Skin: Negative for color change. Neurological: Negative for dizziness and light-headedness. Psychiatric/Behavioral: Negative for dysphoric mood. The patient is not nervous/anxious.         Patient-Reported Vitals 2021   Patient-Reported Weight 168   Patient-Reported Height 5 2   Patient-Reported Systolic 039   Patient-Reported Diastolic 82   Patient-Reported Pulse 83   Patient-Reported Temperature 98.8   Patient-Reported SpO2 99 Lani Sinclair is a 56 y.o. female being evaluated by a Virtual Visit (video visit) encounter to address concerns as mentioned above. A caregiver was present when appropriate. Due to this being a TeleHealth encounter (During QYIEV-92 public health emergency), evaluation of the following organ systems was limited: Vitals/Constitutional/EENT/Resp/CV/GI//MS/Neuro/Skin/Heme-Lymph-Imm. Pursuant to the emergency declaration under the [address] [place] [address] and the [employer] Act, this Virtual Visit was conducted with patient's (and/or legal guardian's) consent, to reduce the patient's risk of exposure to COVID-19 and provide necessary medical care. The patient (and/or legal guardian) has also been advised to contact this office for worsening conditions or problems, and seek emergency medical treatment and/or call 911 if deemed necessary. Patient identification was verified at the start of the visit: Yes    Services were provided through a video synchronous discussion virtually to substitute for in-person clinic visit. Patient was located at home and provider was located in office or at home. An electronic signature was used to authenticate this note.     --Calvin Ignacio MD

## 2021-01-27 ENCOUNTER — HOSPITAL ENCOUNTER (OUTPATIENT)
Age: 61
Discharge: HOME | End: 2021-01-27
Payer: COMMERCIAL

## 2021-01-27 LAB
ALBUMIN SERPL-MCNC: 4.6 GM/DL (ref 3.4–5)
ALP BLD-CCNC: 79 IU/L (ref 40–128)
ALT SERPL-CCNC: 32 U/L (ref 10–40)
ANION GAP SERPL CALCULATED.3IONS-SCNC: 10 MMOL/L (ref 4–16)
AST SERPL-CCNC: 23 IU/L (ref 15–37)
BASOPHILS ABSOLUTE: 0 K/CU MM
BASOPHILS RELATIVE PERCENT: 0.2 % (ref 0–1)
BILIRUB SERPL-MCNC: 0.7 MG/DL (ref 0–1)
BUN BLDV-MCNC: 12 MG/DL (ref 6–23)
CALCIUM SERPL-MCNC: 9.4 MG/DL (ref 8.3–10.6)
CHLORIDE BLD-SCNC: 101 MMOL/L (ref 99–110)
CHOLESTEROL: 186 MG/DL
CO2: 29 MMOL/L (ref 21–32)
CREAT SERPL-MCNC: 0.8 MG/DL (ref 0.6–1.1)
DIFFERENTIAL TYPE: ABNORMAL
EOSINOPHILS ABSOLUTE: 0.1 K/CU MM
EOSINOPHILS RELATIVE PERCENT: 1.1 % (ref 0–3)
GFR AFRICAN AMERICAN: >60 ML/MIN/1.73M2
GFR NON-AFRICAN AMERICAN: >60 ML/MIN/1.73M2
GLUCOSE BLD-MCNC: 107 MG/DL (ref 70–99)
HCT VFR BLD CALC: 44.9 % (ref 37–47)
HDLC SERPL-MCNC: 34 MG/DL
HEMOGLOBIN: 14.7 GM/DL (ref 12.5–16)
IMMATURE NEUTROPHIL %: 0.4 % (ref 0–0.43)
LDL CHOLESTEROL DIRECT: 85 MG/DL
LYMPHOCYTES ABSOLUTE: 2.4 K/CU MM
LYMPHOCYTES RELATIVE PERCENT: 20.8 % (ref 24–44)
MCH RBC QN AUTO: 28.4 PG (ref 27–31)
MCHC RBC AUTO-ENTMCNC: 32.7 % (ref 32–36)
MCV RBC AUTO: 86.7 FL (ref 78–100)
MONOCYTES ABSOLUTE: 0.8 K/CU MM
MONOCYTES RELATIVE PERCENT: 7.1 % (ref 0–4)
NUCLEATED RBC %: 0 %
PDW BLD-RTO: 13.8 % (ref 11.7–14.9)
PLATELET # BLD: 253 K/CU MM (ref 140–440)
PMV BLD AUTO: 10.5 FL (ref 7.5–11.1)
POTASSIUM SERPL-SCNC: 4.5 MMOL/L (ref 3.5–5.1)
RBC # BLD: 5.18 M/CU MM (ref 4.2–5.4)
SEGMENTED NEUTROPHILS ABSOLUTE COUNT: 8.2 K/CU MM
SEGMENTED NEUTROPHILS RELATIVE PERCENT: 70.4 % (ref 36–66)
SODIUM BLD-SCNC: 140 MMOL/L (ref 135–145)
TOTAL IMMATURE NEUTOROPHIL: 0.05 K/CU MM
TOTAL NUCLEATED RBC: 0 K/CU MM
TOTAL PROTEIN: 7 GM/DL (ref 6.4–8.2)
TRIGL SERPL-MCNC: 471 MG/DL
WBC # BLD: 11.7 K/CU MM (ref 4–10.5)

## 2021-01-27 PROCEDURE — 80061 LIPID PANEL: CPT

## 2021-01-27 PROCEDURE — 83721 ASSAY OF BLOOD LIPOPROTEIN: CPT

## 2021-01-27 PROCEDURE — 80053 COMPREHEN METABOLIC PANEL: CPT

## 2021-01-27 PROCEDURE — 85025 COMPLETE CBC W/AUTO DIFF WBC: CPT

## 2021-01-27 PROCEDURE — 36415 COLL VENOUS BLD VENIPUNCTURE: CPT

## 2021-02-04 ENCOUNTER — OFFICE VISIT (OUTPATIENT)
Dept: INTERNAL MEDICINE CLINIC | Age: 61
End: 2021-02-04
Payer: COMMERCIAL

## 2021-02-04 VITALS
WEIGHT: 174 LBS | BODY MASS INDEX: 31.83 KG/M2 | TEMPERATURE: 97.4 F | HEART RATE: 71 BPM | OXYGEN SATURATION: 98 % | DIASTOLIC BLOOD PRESSURE: 82 MMHG | SYSTOLIC BLOOD PRESSURE: 138 MMHG

## 2021-02-04 DIAGNOSIS — M79.671 CHRONIC PAIN OF BOTH FEET: ICD-10-CM

## 2021-02-04 DIAGNOSIS — M79.672 CHRONIC PAIN OF BOTH FEET: ICD-10-CM

## 2021-02-04 DIAGNOSIS — G89.29 CHRONIC PAIN OF BOTH FEET: ICD-10-CM

## 2021-02-04 DIAGNOSIS — I10 ESSENTIAL HYPERTENSION: Primary | ICD-10-CM

## 2021-02-04 DIAGNOSIS — R73.03 PREDIABETES: ICD-10-CM

## 2021-02-04 DIAGNOSIS — E78.1 HYPERTRIGLYCERIDEMIA: ICD-10-CM

## 2021-02-04 DIAGNOSIS — E55.9 VITAMIN D DEFICIENCY: ICD-10-CM

## 2021-02-04 PROCEDURE — 99214 OFFICE O/P EST MOD 30 MIN: CPT | Performed by: FAMILY MEDICINE

## 2021-02-04 RX ORDER — HYDROCODONE BITARTRATE AND ACETAMINOPHEN 5; 325 MG/1; MG/1
1 TABLET ORAL 2 TIMES DAILY PRN
Qty: 60 TABLET | Refills: 0 | Status: SHIPPED | OUTPATIENT
Start: 2021-02-04 | End: 2021-04-01 | Stop reason: SDUPTHER

## 2021-02-04 RX ORDER — LOSARTAN POTASSIUM 100 MG/1
100 TABLET ORAL DAILY
COMMUNITY
Start: 2021-01-25 | End: 2022-07-14 | Stop reason: SDUPTHER

## 2021-02-04 SDOH — ECONOMIC STABILITY: FOOD INSECURITY: WITHIN THE PAST 12 MONTHS, YOU WORRIED THAT YOUR FOOD WOULD RUN OUT BEFORE YOU GOT MONEY TO BUY MORE.: NEVER TRUE

## 2021-02-04 SDOH — ECONOMIC STABILITY: FOOD INSECURITY: WITHIN THE PAST 12 MONTHS, THE FOOD YOU BOUGHT JUST DIDN'T LAST AND YOU DIDN'T HAVE MONEY TO GET MORE.: NEVER TRUE

## 2021-02-04 SDOH — ECONOMIC STABILITY: INCOME INSECURITY: HOW HARD IS IT FOR YOU TO PAY FOR THE VERY BASICS LIKE FOOD, HOUSING, MEDICAL CARE, AND HEATING?: NOT HARD AT ALL

## 2021-02-04 ASSESSMENT — ENCOUNTER SYMPTOMS
CHEST TIGHTNESS: 0
COLOR CHANGE: 0
SORE THROAT: 0
ABDOMINAL PAIN: 0
DIARRHEA: 0
CONSTIPATION: 0
VOMITING: 0
SHORTNESS OF BREATH: 0

## 2021-02-04 NOTE — PROGRESS NOTES
Subjective:      Chief Complaint   Patient presents with    Discuss Labs    Hypertension       HPI:  Nguyễn Fish is a 56 y.o. female who presents today for follow up of chronic conditions as listed below. Hypertriglyceridemia:  Dermatology recommended starting OTC supplement for her skin ([employer]) that contains nicotinamide- has been taking about twice a day and tolerating it without issues. Still is trying to take vascepa and trilipix but has not been able to take much due to GI side effects. States she had not taken either of the medications for about a month prior to her lab draw. HTN:  BP's usually 130's/80's at home.     Chronic foot pain: States she has been working with physical therapy and her homeopathic provider on pain control, has been able to skip a few doses of Norco.    The 10-year ASCVD risk score (Jessica Christian, et al., 2013) is: 7.9%    Values used to calculate the score:      Age: 56 years      Sex: Female      Is Non- : No      Diabetic: No      Tobacco smoker: No      Systolic Blood Pressure: 536 mmHg      Is BP treated: Yes      HDL Cholesterol: 34 MG/DL      Total Cholesterol: 186 MG/DL       Past Medical History:   Diagnosis Date    Fatty liver     by US    Fibromyalgia     GERD (gastroesophageal reflux disease)     by UGI    Gout     Hyperlipidemia     Hypertension     Hypoglycemia     IBS (irritable bowel syndrome)     Prolonged emergence from general anesthesia     PSVT (paroxysmal supraventricular tachycardia) ([address]     s/p ablation        Past Surgical History:   Procedure Laterality Date    ABDOMEN SURGERY       Cholecystectomy    ABDOMEN SURGERY      Rt ovarian adhesions removed and cervical laser therapy/Dr Gary Edouard ABDOMEN SURGERY      Rt ovarian cyst removed    ABLATION OF DYSRHYTHMIC FOCUS      APPENDECTOMY      BONE CYST EXCISION  1980    Left bone cyst aneurysm removed    CHOLECYSTECTOMY      COLONOSCOPY  1/19/2016 diverticulosis    FOOT NEUROMA SURGERY Right     TONSILLECTOMY         Social History     Tobacco Use    Smoking status: Never Smoker    Smokeless tobacco: Never Used   Substance Use Topics    Alcohol use: Yes     Comment: occasionally        Review of Systems   Constitutional: Negative for activity change, appetite change, chills, fever and unexpected weight change. HENT: Negative for congestion and sore throat. Respiratory: Negative for chest tightness and shortness of breath. Cardiovascular: Negative for chest pain and palpitations. Gastrointestinal: Negative for abdominal pain, constipation, diarrhea and vomiting. Genitourinary: Negative for dysuria. Musculoskeletal: Positive for arthralgias. Skin: Negative for color change. Neurological: Negative for dizziness and light-headedness. Psychiatric/Behavioral: Negative for dysphoric mood. The patient is not nervous/anxious. Prior to Visit Medications    Medication Sig Taking? Authorizing Provider   losartan (COZAAR) 100 MG tablet  Yes Historical Provider, MD   albuterol sulfate HFA (VENTOLIN HFA) 108 (90 Base) MCG/ACT inhaler Inhale 2 puffs into the lungs every 6 hours as needed for Wheezing or Shortness of Breath Yes Danii Paez MD   HYDROcodone-acetaminophen (NORCO) 5-325 MG per tablet Take 1 tablet by mouth 2 times daily as needed for Pain (back pain) for up to 60 days.  Yes Danii Paez MD   dilTIAZem (CARDIZEM CD) 240 MG extended release capsule TAKE ONE (1) CAPSULE BY MOUTH ONCE DAILY Yes Danii Paez MD   famciclovir (FAMVIR) 500 MG tablet Take 3 tablets as a single dose as directed Yes Nicci Bush MD   omeprazole (PRILOSEC) 20 MG delayed release capsule Take 1 capsule by mouth Daily Yes Nicci Bush MD   choline fenofibrate (TRILIPIX) 135 MG CPDR delayed release capsule TAKE ONE CAPSULE BY MOUTH EVERY DAY Yes Nicci Bush MD   VASCEPA 1 g CAPS capsule Take 1 capsule by mouth 2 times daily Yes Thuy Smith MD   Coenzyme Q10 (CO Q 10 PO) Take by mouth as needed  Yes Historical Provider, MD   Fexofenadine HCl (ALLEGRA PO) Take by mouth as needed  Yes Historical Provider, MD   Cholecalciferol (VITAMIN D3) 2000 UNITS CAPS Take  by mouth 2 times daily. Yes Historical Provider, MD   Multiple Vitamins-Minerals (THERAPEUTIC MULTIVIT/MINERAL PO) Take 1 tablet by mouth daily as needed  Yes Historical Provider, MD          Objective:      /82 (Site: Right Upper Arm, Position: Sitting, Cuff Size: Large Adult)   Pulse 71   Temp 97.4 °F (36.3 °C)   Wt 174 lb (78.9 kg)   SpO2 98%   BMI 31.83 kg/m²      Physical Exam  Vitals signs and nursing note reviewed. Constitutional:       General: She is not in acute distress. Appearance: Normal appearance. She is not ill-appearing or toxic-appearing. HENT:      Head: Normocephalic and atraumatic. Right Ear: External ear normal.      Left Ear: External ear normal.      Nose: Nose normal.      Mouth/Throat:      Pharynx: Oropharynx is clear. Eyes:      General: No scleral icterus. Right eye: No discharge. Left eye: No discharge. Extraocular Movements: Extraocular movements intact. Conjunctiva/sclera: Conjunctivae normal.   Neck:      Musculoskeletal: Normal range of motion and neck supple. No neck rigidity. Cardiovascular:      Rate and Rhythm: Normal rate and regular rhythm. Heart sounds: Normal heart sounds. Pulmonary:      Effort: Pulmonary effort is normal.      Breath sounds: Normal breath sounds. No wheezing or rales. Musculoskeletal:         General: No deformity. Tenderness: .stable. Skin:     General: Skin is warm and dry. Findings: No rash. Neurological:      General: No focal deficit present. Mental Status: She is alert. Mental status is at baseline. Motor: No weakness.    Psychiatric:         Mood and Affect: Mood normal.         Behavior: Behavior normal. Assessment / Plan:      1. Essential hypertension  Stable, well controlled. Continue current medications. - CBC Auto Differential; Future    2. Hypertriglyceridemia  TG still elevated but significantly improved from her last labs. Advised continuing over-the-counter supplement per dermatology as it has been improving her triglyceride levels. - Lipid Panel; Future    3. Vitamin D deficiency  - Vitamin D 25 Hydroxy; Future    4. Prediabetes  Encourage dietary modifications, will continue to monitor.  - Hemoglobin A1C; Future    5. Chronic pain of both feet  Has been working with physical therapy and homeopathic provider on improved pain control, encouraged to continue skipping doses of Norco as able. - HYDROcodone-acetaminophen (NORCO) 5-325 MG per tablet; Take 1 tablet by mouth 2 times daily as needed for Pain (back pain) for up to 60 days. Dispense: 60 tablet; Refill: 0          Patient voiced understanding and agreement with plan. All questions/concerns were addressed, risks/side effects of medications were reviewed. Return precautions and after visit summary were provided. Return in about 4 months (around 6/4/2021). or earlier as needed.       Mary Vo MD

## 2021-02-25 ENCOUNTER — OFFICE VISIT (OUTPATIENT)
Dept: INTERNAL MEDICINE CLINIC | Age: 61
End: 2021-02-25
Payer: COMMERCIAL

## 2021-02-25 VITALS
DIASTOLIC BLOOD PRESSURE: 80 MMHG | TEMPERATURE: 97.2 F | SYSTOLIC BLOOD PRESSURE: 141 MMHG | OXYGEN SATURATION: 97 % | BODY MASS INDEX: 32.01 KG/M2 | WEIGHT: 175 LBS | HEART RATE: 84 BPM

## 2021-02-25 DIAGNOSIS — M54.2 NECK PAIN, ACUTE: Primary | ICD-10-CM

## 2021-02-25 PROCEDURE — 99214 OFFICE O/P EST MOD 30 MIN: CPT | Performed by: FAMILY MEDICINE

## 2021-02-25 RX ORDER — TIZANIDINE 2 MG/1
TABLET ORAL
Qty: 20 TABLET | Refills: 0 | Status: SHIPPED | OUTPATIENT
Start: 2021-02-25 | End: 2021-05-27 | Stop reason: ALTCHOICE

## 2021-02-25 NOTE — PROGRESS NOTES
Gastrointestinal: Negative for abdominal pain, constipation, diarrhea and vomiting. Genitourinary: Negative for dysuria. Musculoskeletal: Positive for neck pain. Skin: Negative for color change. Neurological: Negative for dizziness and light-headedness. Psychiatric/Behavioral: Negative for dysphoric mood. The patient is not nervous/anxious. Prior to Visit Medications    Medication Sig Taking? Authorizing Provider   losartan (COZAAR) 100 MG tablet  Yes Historical Provider, MD   HYDROcodone-acetaminophen (NORCO) 5-325 MG per tablet Take 1 tablet by mouth 2 times daily as needed for Pain (back pain) for up to 60 days. Yes Huey Tang MD   albuterol sulfate HFA (VENTOLIN HFA) 108 (90 Base) MCG/ACT inhaler Inhale 2 puffs into the lungs every 6 hours as needed for Wheezing or Shortness of Breath Yes Huey Tang MD   dilTIAZem (CARDIZEM CD) 240 MG extended release capsule TAKE ONE (1) CAPSULE BY MOUTH ONCE DAILY Yes Huey Tang MD   famciclovir (FAMVIR) 500 MG tablet Take 3 tablets as a single dose as directed Yes Yari Ramirez MD   omeprazole (PRILOSEC) 20 MG delayed release capsule Take 1 capsule by mouth Daily Yes Yari Ramirez MD   choline fenofibrate (TRILIPIX) 135 MG CPDR delayed release capsule TAKE ONE CAPSULE BY MOUTH EVERY DAY Yes Yari Ramirez MD   VASCEPA 1 g CAPS capsule Take 1 capsule by mouth 2 times daily Yes Yari Ramirez MD   Coenzyme Q10 (CO Q 10 PO) Take by mouth as needed  Yes Historical Provider, MD   Fexofenadine HCl (ALLEGRA PO) Take by mouth as needed  Yes Historical Provider, MD   Cholecalciferol (VITAMIN D3) 2000 UNITS CAPS Take  by mouth 2 times daily.  Yes Historical Provider, MD   Multiple Vitamins-Minerals (THERAPEUTIC MULTIVIT/MINERAL PO) Take 1 tablet by mouth daily as needed  Yes Historical Provider, MD          Objective: BP (!) 141/80 (Site: Right Upper Arm, Position: Sitting, Cuff Size: Large Adult)   Pulse 84   Temp 97.2 °F (36.2 °C)   Wt 175 lb (79.4 kg)   SpO2 97%   BMI 32.01 kg/m²      Physical Exam  Vitals signs and nursing note reviewed. Constitutional:       General: She is not in acute distress. Appearance: Normal appearance. She is not ill-appearing or toxic-appearing. HENT:      Head: Normocephalic and atraumatic. Right Ear: External ear normal.      Left Ear: External ear normal.      Nose: Nose normal.      Mouth/Throat:      Pharynx: Oropharynx is clear. Eyes:      General: No scleral icterus. Right eye: No discharge. Left eye: No discharge. Extraocular Movements: Extraocular movements intact. Conjunctiva/sclera: Conjunctivae normal.   Neck:      Musculoskeletal: Normal range of motion and neck supple. No neck rigidity. Cardiovascular:      Rate and Rhythm: Normal rate and regular rhythm. Heart sounds: Normal heart sounds. Pulmonary:      Effort: Pulmonary effort is normal.      Breath sounds: Normal breath sounds. No wheezing or rales. Musculoskeletal:         General: No deformity. Skin:     General: Skin is warm and dry. Findings: No rash. Neurological:      General: No focal deficit present. Mental Status: She is alert. Mental status is at baseline. Motor: No weakness. Psychiatric:         Mood and Affect: Mood normal.         Behavior: Behavior normal.            Assessment / Plan:      1. Neck pain, acute  Acute, atraumatic. Likely muscular, will treat symptomatically- may use muscle relaxant, continue tylenol, stretching, ice/heat as needed. - tiZANidine (ZANAFLEX) 2 MG tablet; Take 1-2 tabs by mouth at night as needed for neck pain  Dispense: 20 tablet;  Refill: 0

## 2021-02-26 ASSESSMENT — ENCOUNTER SYMPTOMS
CONSTIPATION: 0
VOMITING: 0
DIARRHEA: 0
COLOR CHANGE: 0
CHEST TIGHTNESS: 0
ABDOMINAL PAIN: 0
SORE THROAT: 0
SHORTNESS OF BREATH: 0

## 2021-03-12 ENCOUNTER — HOSPITAL ENCOUNTER (OUTPATIENT)
Dept: PHYSICAL THERAPY | Age: 61
Setting detail: THERAPIES SERIES
Discharge: HOME | End: 2021-03-12
Payer: COMMERCIAL

## 2021-03-12 PROCEDURE — 97110 THERAPEUTIC EXERCISES: CPT

## 2021-03-12 PROCEDURE — 97140 MANUAL THERAPY 1/> REGIONS: CPT

## 2021-03-12 PROCEDURE — 97162 PT EVAL MOD COMPLEX 30 MIN: CPT

## 2021-03-12 PROCEDURE — 97016 VASOPNEUMATIC DEVICE THERAPY: CPT

## 2021-03-12 ASSESSMENT — PAIN DESCRIPTION - PAIN TYPE: TYPE: ACUTE PAIN

## 2021-03-12 ASSESSMENT — PAIN DESCRIPTION - LOCATION: LOCATION: NECK;SHOULDER

## 2021-03-12 ASSESSMENT — PAIN DESCRIPTION - FREQUENCY: FREQUENCY: CONTINUOUS

## 2021-03-12 NOTE — PLAN OF CARE
Outpatient Physical Therapy           [place]           [x] Phone: [phone]   Fax: [phone]  [facility]           [] Phone: [phone]   Fax: [phone]     To: Referring Practitioner: Dayanara White MD  From: Abdias Kaur, PT, DPT     Patient: Ritu Engle       : 1960  Diagnosis: Diagnosis: Neck pain   Treatment Diagnosis: Treatment Diagnosis: LUE weakness, impaired C spine AROM    Date: 3/12/2021    Physical Therapy Certification/Re-Certification Form  Dear Dr. Edis Melgoza,  The following patient has been evaluated for physical therapy services and for therapy to continue, insurance requires physician review of the treatment plan initially and every 90 days. Please review the attached evaluation and/or summary of the patient's plan of care, and verify that you agree therapy should continue by signing the attached document and sending it back to our office. Assessment:    Assessment: Pt is a 64-year-old female who presents to therapy with neck pain worsening in 2021, with associated L shoulder pain. Upon assessment, pt does present with impaired C spine AROM, increased cervical musculature tissue tension, LUE weakness, neck and L shoulder pain and reduced independence with ADL completion. PT suspects L RTC involvement at this time due to special test results and location/ type of pain being reported. Pt would benefit from skilled therapy interventions to address listed impairments, progress toward goal completion and improve ADL/IADL status. PT also warranted to reduce risk for further injury or decline. Patient agrees with established plan of care and assisted in the development of their short term and long term goals. Patient had no adverse reaction with initial treatment and there are no barriers to learning. Demonstrates no mental or cognitive disorder.      Plan of Care/Treatment to date:  [x] Therapeutic Exercise  [x] Modalities:  [x] Therapeutic Activity     [x] Ultrasound  [x] Electrical Stimulation  [] Gait Training      [x] Cervical Traction [] Lumbar Traction  [x] Neuromuscular Re-education    [x] Cold/hotpack [] Iontophoresis   [x] Instruction in HEP      [x] Vasopneumatic    [x] Dry Needling  [x] Manual Therapy               [] Aquatic Therapy       Other:          Frequency/Duration:  # Days per week: [] 1 day # Weeks: [] 1 week [x] 5 weeks     [x] 2 days   [] 2 weeks [] 6 weeks     [] 3 days   [] 3 weeks [] 7 weeks     [] 4 days   [] 4 weeks [] 8 weeks         [] 9 weeks [] 10 weeks         [] 11 weeks [] 12 weeks    Rehab Potential/Progress: [] Excellent [x] Good [] Fair  [] Poor     Goals:      Short term goals  Time Frame for Short term goals: 5 visits  Short term goal 1: Pt will be IND with HEP in order to maximize recovery outside of clinic  Short term goal 2: Pt will improve L C spine Rot to 60 deg or higher to normalize w/ R side and aide in ADLs  Long term goals  Time Frame for Long term goals : 10 visits  Long term goal 1: Pt will improve LUE strength to 4+/5 or higher to aide in ADLs  Long term goal 2: Pt will improve BL C spine SB to 35 deg or higher to aide in ADLs  Long term goal 3: Pt will improve Quick Dash to 24% or lower to show [address] and subjective improvement      Electronically signed by:  Sukhdev Todd PT, DPT  3/12/2021, 12:55 PM        If you have any questions or concerns, please don't hesitate to call.   Thank you for your referral.      Physician Signature:________________________________Date:_________ TIME: _____  By signing above, therapists plan is approved by physician

## 2021-03-12 NOTE — FLOWSHEET NOTE
Outpatient Physical Therapy  [place]           [x] Phone: [phone]   Fax: [phone]  [facility]           [] Phone: [phone]   Fax: [phone]        Physical Therapy Daily Treatment Note  Date:  3/12/2021    Patient Name:  Jair Grayson    :  1960  MRN: [id number]  Restrictions/Precautions:  none  Diagnosis:   Diagnosis: Neck pain  Date of Injury/Surgery:   Treatment Diagnosis: Treatment Diagnosis: LUE weakness, impaired C spine AROM    Insurance/Certification information: PT Insurance Information: [place] - 76 PCY  Referring Physician:  Referring Practitioner: Janna Dandy, MD  Next Doctor Visit:    Plan of care signed (Y/N):  POC sent 3/11  Outcome Measure: Quick Dash: 34%. NDI:   Visit# / total visits:   1/10  Pain level: 3/10   Goals:       Short term goals  Time Frame for Short term goals: 5 visits  Short term goal 1: Pt will be IND with HEP in order to maximize recovery outside of clinic  Short term goal 2: Pt will improve L C spine Rot to 60 deg or higher to normalize w/ R side and aide in ADLs  Long term goals  Time Frame for Long term goals : 10 visits  Long term goal 1: Pt will improve LUE strength to 4+/5 or higher to aide in ADLs  Long term goal 2: Pt will improve BL C spine SB to 35 deg or higher to aide in ADLs  Long term goal 3: Pt will improve Quick Dash to 24% or lower to show [address] and subjective improvement    Summary of Evaluation: Assessment: Pt is a 77-year-old female who presents to therapy with neck pain worsening in 2021, with associated L shoulder pain. Upon assessment, pt does present with impaired C spine AROM, increased cervical musculature tissue tension, LUE weakness, neck and L shoulder pain and reduced independence with ADL completion. PT suspects L RTC involvement at this time due to special test results and location/ type of pain being reported.  Pt would benefit from skilled therapy interventions to address listed impairments, progress toward goal completion and improve ADL/IADL status. PT also warranted to reduce risk for further injury or decline. Subjective:  See eval         Any changes in Ambulatory Summary Sheet? None        Objective:  See eval   Prior to today's treatment session, patient was screened for signs and symptoms related to COVID-19 including but not limited to verbally answering questions related to feeling ill, cough, or SOB, along with taking temperature via forehead thermometer. Patient presented with all negative signs and symptoms and had no fever >100 degrees Fahrenheit this date. Exercises: (No more than 4 columns)   Exercise/Equipment 3/12/21 #1 Date Date           WARM UP                     TABLE      Chin tucks X10, 5\"     UT/LS  stretch 2x 30\" ea     scap retraction X10, 3\"                    STANDING                                                     PROPRIOCEPTION                                    MODALITIES                      Other Therapeutic Activities/Education:  Pt tolerated today's treatment without any adverse reactions or complications this date. Home Exercise Program:  Issued, practiced and pt demo ability to perform 3/12/2021    Manual Treatments:  STM UR/LS rhomboids, manual UT stretching, C spine manual distraction, L shoulder KT tape for improved resting joint position    Modalities:  10' vaso L shoulder in sitting low pressure: no adverse skin reactions or complications after tx      Communication with other providers:  POC sent 3/11      Assessment:  End pain: 3/10. Pt tolerated today's treatment without any adverse reactions or complications this date. Assessment: Pt is a 68-year-old female who presents to therapy with neck pain worsening in Feb 2021, with associated L shoulder pain.  Upon assessment, pt does present with impaired C spine AROM, increased cervical musculature tissue tension, LUE weakness, neck and L shoulder pain and reduced independence with ADL completion. PT suspects L RTC involvement at this time due to special test results and location/ type of pain being reported. Pt would benefit from skilled therapy interventions to address listed impairments, progress toward goal completion and improve ADL/IADL status. PT also warranted to reduce risk for further injury or decline.       Plan for Next Session: UBE, C spine AROM, UT/LS stretching, STM and manual, RTC strengthening w/in limits of pain      Time In / Time Out:    0637-1925    Timed Code/Total Treatment Minutes:  63'/73': 10' vaso x1, 23' MT x2, 8'TE x1, 32' eval x1    Next Progress Note due:  10th visit    Plan of Care Interventions:  [x] Therapeutic Exercise  [x] Modalities:  [x] Therapeutic Activity     [x] Ultrasound  [x] Estim  [] Gait Training      [x] Cervical Traction [] Lumbar Traction  [x] Neuromuscular Re-education    [x] Cold/hotpack [] Iontophoresis   [x] Instruction in HEP      [x] Vasopneumatic   [x] Dry Needling    [x] Manual Therapy               [] Aquatic Therapy              Electronically signed by:  Ji Espino, PT, DPT 3/12/2021, 12:56 PM

## 2021-03-12 NOTE — PROGRESS NOTES
Physical Therapy  Initial Assessment  Date: 3/12/2021  Patient Name: Bebeto Colby  MRN: [id number]  : 1960     Treatment Diagnosis: LUE weakness, impaired C spine AROM    Restrictions: none       Subjective   General  Chart Reviewed: Yes  Patient assessed for rehabilitation services?: Yes  Additional Pertinent Hx: HTN, OA, fibromyalgia  Referring Practitioner: Esmer Gibbs MD  Referral Date : 21  Diagnosis: Neck pain  Follows Commands: Within Functional Limits  PT Visit Information  PT Insurance Information: [employer] - 75  Subjective  Subjective: Pt noted that her pain started around  this year, but she has had neck pain and involvement in the past. Pt notes insidious onset both prior pain and current. She states that the pain feels muscular in nature and is affecting her LUE (none on RUE) to where her L arm also has achy/ sore pain and she feels slightly limited with use on that side and mobility. Pt denies radicular symptoms into the BUEs. Pt can perform her ADLs, but she has a difficult time using her LUE such as lifting up toward her head or cooking. Pain Screening  Patient Currently in Pain: Yes  Pain Assessment  Pain Assessment: 0-10  Pain Level: 3  Pain Type: Acute pain  Pain Location: Neck; Shoulder  Pain Orientation: Left  Pain Descriptors: Aching;Tender; Sore  Pain Frequency: Continuous  Pain Onset: Gradual  Clinical Progression: Gradually worsening  Functional Pain Assessment: Prevents or interferes some active activities and ADLs  Vital Signs  Patient Currently in Pain: Yes    Vision/Hearing  Vision  Vision: Within Functional Limits  Hearing  Hearing: Within functional limits    Orientation  Orientation  Overall Orientation Status: Within Normal Limits    Social/Functional History  Social/Functional History  ADL Assistance: Independent  Homemaking Assistance: Independent  Ambulation Assistance: Independent  Transfer Assistance: Independent  Occupation: Part time employment  Type of occupation: computer work    Objective     Observation/Palpation  Posture: Fair  Palpation: Pt has TTP over L UT/LS, BL suboccipitals, supraspinatus muscle belly / tendon  Observation: forward head w/ anterior shoulders    AROM RUE (degrees)  RUE AROM : WNL  AROM LUE (degrees)  LUE AROM : WNL  Spine  Cervical: Flex: WFL ext: WFL R SB: 23 L SB: 26 R Rot: 61  L Rot: 42  Joint Mobility  Spine: WNL C spine mobility , mild hypomobility upper C spine, hypomobile CT junction but no pain upon assessment    Strength RUE  Strength RUE: WFL  Strength LUE  Strength LUE: Exception  L Shoulder Flexion: 4-/5  L Shoulder ABduction: 4-/5  L Shoulder Internal Rotation: 4/5  L Shoulder External Rotation: 4-/5  L Elbow Flexion: 4+/5  L Elbow Extension: 4+/5     Additional Measures  Flexibility: reduced flexibility of L UT/ LS  Special Tests: belly press: pain. IR lift off: pain. AC cross body: no pain. Yergasons: no pain. Neers and [place]: mild pain. Speeds: mild pain. Empty can: significant pain. Other: Quadrant: all 4 directions irritated L side neck, no symptoms into LUE  Sensation  Overall Sensation Status: WFL       Assessment   Conditions Requiring Skilled Therapeutic Intervention  Body structures, Functions, Activity limitations: Decreased functional mobility ; Decreased ADL status; Decreased ROM; Decreased strength;Decreased high-level IADLs;Decreased posture; Increased pain  Assessment: Pt is a 68-year-old female who presents to therapy with neck pain worsening in Feb 2021, with associated L shoulder pain. Upon assessment, pt does present with impaired C spine AROM, increased cervical musculature tissue tension, LUE weakness, neck and L shoulder pain and reduced independence with ADL completion. PT suspects L RTC involvement at this time due to special test results and location/ type of pain being reported.  Pt would benefit from skilled therapy interventions to address listed impairments, progress toward goal completion and improve ADL/IADL status. PT also warranted to reduce risk for further injury or decline. Treatment Diagnosis: LUE weakness, impaired C spine AROM  Prognosis: Good  Decision Making: Medium Complexity  History: see above. PLOF: independent  Exam: see above  Clinical Presentation: see above  Barriers to Learning: none. style: demo  REQUIRES PT FOLLOW UP: Yes  Treatment Initiated : yes on 3/11    Patient agrees with established plan of care and assisted in the development of their short term and long term goals. Patient had no adverse reaction with initial treatment and there are no barriers to learning. Demonstrates no mental or cognitive disorder.          Plan   Plan  Times per week: 2  Times per day: Daily  Plan weeks: 5  Specific instructions for Next Treatment: UBE, C spine AROM, UT/LS stretching, STM and manual, RTC strengthening w/in limits of pain  Current Treatment Recommendations: Strengthening, IADL Training, Home Exercise Program, Neuromuscular Re-education, ROM, Manual Therapy - Soft Tissue Mobilization, Safety Education & Training, Patient/Caregiver Education & Training, Modalities, ADL/Self-care Training, Pain Management, Functional Mobility Training      OutComes Score    Quick Dash: 34%    NDI: 12/50    Goals  Short term goals  Time Frame for Short term goals: 5 visits  Short term goal 1: Pt will be IND with HEP in order to maximize recovery outside of clinic  Short term goal 2: Pt will improve L C spine Rot to 60 deg or higher to normalize w/ R side and aide in ADLs  Long term goals  Time Frame for Long term goals : 10 visits  Long term goal 1: Pt will improve LUE strength to 4+/5 or higher to aide in ADLs  Long term goal 2: Pt will improve BL C spine SB to 35 deg or higher to aide in ADLs  Long term goal 3: Pt will improve Quick Dash to 24% or lower to show [address] and subjective improvement  Patient Goals   Patient goals : reduce neck and L shoulder pain       Therapy Time: [address], PT, DPT

## 2021-03-26 ENCOUNTER — HOSPITAL ENCOUNTER (OUTPATIENT)
Dept: PHYSICAL THERAPY | Age: 61
Setting detail: THERAPIES SERIES
Discharge: HOME | End: 2021-03-26
Payer: COMMERCIAL

## 2021-03-26 PROCEDURE — 97140 MANUAL THERAPY 1/> REGIONS: CPT

## 2021-03-26 PROCEDURE — 97110 THERAPEUTIC EXERCISES: CPT

## 2021-03-26 NOTE — FLOWSHEET NOTE
Outpatient Physical Therapy  [place]           [x] Phone: [phone]   Fax: [phone]  [place]           [] Phone: [phone]   Fax: [phone]        Physical Therapy Daily Treatment Note  Date:  3/26/2021    Patient Name:  Ritu Engle    :  1960  MRN: [id number]  Restrictions/Precautions:  none  Diagnosis:   Diagnosis: Neck pain  Date of Injury/Surgery:   Treatment Diagnosis: Treatment Diagnosis: LUE weakness, impaired C spine AROM    Insurance/Certification information: PT Insurance Information: [place] - 78 PCY  Referring Physician:  Referring Practitioner: Dayanara White MD  Next Doctor Visit:    Plan of care signed (Y/N):  yes  Outcome Measure: Quick Dash: 34%. NDI:   Visit# / total visits:   2/10  Pain level: 0/10   Goals:       Short term goals  Time Frame for Short term goals: 5 visits  Short term goal 1: Pt will be IND with HEP in order to maximize recovery outside of clinic: GOAL MET 3/26  Short term goal 2: Pt will improve L C spine Rot to 60 deg or higher to normalize w/ R side and aide in ADLs  Long term goals  Time Frame for Long term goals : 10 visits  Long term goal 1: Pt will improve LUE strength to 4+/5 or higher to aide in ADLs  Long term goal 2: Pt will improve BL C spine SB to 35 deg or higher to aide in ADLs  Long term goal 3: Pt will improve Quick Dash to 24% or lower to show [address] and subjective improvement    Summary of Evaluation: Assessment: Pt is a 68-year-old female who presents to therapy with neck pain worsening in 2021, with associated L shoulder pain. Upon assessment, pt does present with impaired C spine AROM, increased cervical musculature tissue tension, LUE weakness, neck and L shoulder pain and reduced independence with ADL completion. PT suspects L RTC involvement at this time due to special test results and location/ type of pain being reported.  Pt would benefit from skilled therapy interventions to address listed impairments, progress toward goal completion and improve ADL/IADL status. PT also warranted to reduce risk for further injury or decline. Subjective:  Pt states she is doing well this date. She has done her HEP and it is helping. She is wanting to know about pillows for improved sleep too. Pt noted that KT tape really helped the L shoulder. Any changes in Ambulatory Summary Sheet? None      Objective:    Prior to today's treatment session, patient was screened for signs and symptoms related to COVID-19 including but not limited to verbally answering questions related to feeling ill, cough, or SOB, along with taking temperature via forehead thermometer. Patient presented with all negative signs and symptoms and had no fever >100 degrees Fahrenheit this date. Increased tissue tension BL suboccipitals    Exercises: (No more than 4 columns)   Exercise/Equipment 3/12/21 #1 3/26/21 #2 Date           WARM UP         UBE  2'/2'          TABLE      Chin tucks X10, 5\" 2X10, 5\"    UT/LS  stretch 2x 30\" ea x30\" ea BL    scap retraction X10, 3\" X10, 3\"                   STANDING                                                     PROPRIOCEPTION                                    MODALITIES                      Other Therapeutic Activities/Education:  HEP    Home Exercise Program:  Issued, practiced and pt demo ability to perform 3/12/2021    Manual Treatments:  STM UR/LS & rhomboids, manual UT stretching and, manual rotation, C spine manual distraction, L shoulder KT tape for improved resting joint position    Modalities:  10' vaso L shoulder in sitting low pressure: no adverse skin reactions or complications after tx   `  Communication with other providers:  POC sent 3/11    Assessment:  End pain: 0/10. Pt tolerated today's treatment without any adverse reactions or complications this date.  Pt demonstrated good tolerance to tx this date and reported feeling looser after manual. Pt would continue to benefit from skilled therapy interventions to address remaining impairments, improve mobility and strength and progress toward goal completion while reducing risk for re-injury or further decline.     Plan for Next Session: UBE, C spine AROM, UT/LS stretching, STM and manual, RTC strengthening w/in limits of pain    Time In / Time Out: 7064-4932    Timed Code/Total Treatment Minutes:  40': 28' MT x2, 12' TE x1    Next Progress Note due:  10th visit    Plan of Care Interventions:  [x] Therapeutic Exercise  [x] Modalities:  [x] Therapeutic Activity     [x] Ultrasound  [x] Estim  [] Gait Training      [x] Cervical Traction [] Lumbar Traction  [x] Neuromuscular Re-education    [x] Cold/hotpack [] Iontophoresis   [x] Instruction in HEP      [x] Vasopneumatic   [x] Dry Needling    [x] Manual Therapy               [] Aquatic Therapy              Electronically signed by:  Vítcor Lloyd PT, DPT 3/26/2021, 7:06 AM

## 2021-03-30 ENCOUNTER — HOSPITAL ENCOUNTER (OUTPATIENT)
Dept: PHYSICAL THERAPY | Age: 61
Setting detail: THERAPIES SERIES
Discharge: HOME | End: 2021-03-30
Payer: COMMERCIAL

## 2021-03-30 PROCEDURE — 97110 THERAPEUTIC EXERCISES: CPT

## 2021-03-30 PROCEDURE — 97016 VASOPNEUMATIC DEVICE THERAPY: CPT

## 2021-03-30 PROCEDURE — 97140 MANUAL THERAPY 1/> REGIONS: CPT

## 2021-03-30 NOTE — FLOWSHEET NOTE
progress toward goal completion and improve ADL/IADL status. PT also warranted to reduce risk for further injury or decline. Subjective:  Pt states she is doing well this date. Reports of only muscle tightness left UT. Removed KT tape last night. Wants to wait until next visit to re-tape to see how she does without it. Any changes in Ambulatory Summary Sheet? None      Objective:    Prior to today's treatment session, patient was screened for signs and symptoms related to COVID-19 including but not limited to verbally answering questions related to feeling ill, cough, or SOB, along with taking temperature via forehead thermometer. Patient presented with all negative signs and symptoms and had no fever >100 degrees Fahrenheit this date. tissue tension left UT    Exercises: (No more than 4 columns)   Exercise/Equipment 3/12/21 #1 3/26/21 #2 Date 3/30/21 #3           WARM UP         UBE  2'/2' 2'/2'         TABLE      Chin tucks X10, 5\" 2X10, 5\" 2X10, 5\"   UT/LS  stretch 2x 30\" ea x30\" ea BL x30\" ea BL   scap retraction X10, 3\" X10, 3\" x10, 3\"                  STANDING      IR/ER with T band   10x ea                                            PROPRIOCEPTION                                    MODALITIES                      Other Therapeutic Activities/Education:  HEP    Home Exercise Program:  Issued, practiced and pt demo ability to perform 3/12/2021  Added IR and ER with YTB. Provided with handout. Manual Treatments:  STM UT/LS & rhomboids, manual UT and levator stretching , manual rotation, and C spine manual distraction,     Modalities:  10' vaso L shoulder in sitting low pressure: no adverse skin reactions or complications after tx   `  Communication with other providers:  POC sent 3/11    Assessment:  End pain: 0/10.   Pt demonstrated good tolerance to tx this date and reported feeling looser after manual. Pt would continue to benefit from skilled therapy interventions to address remaining impairments, improve mobility and strength and progress toward goal completion while reducing risk for re-injury or further decline.     Plan for Next Session: UBE, C spine AROM, UT/LS stretching, STM and manual, RTC strengthening w/in limits of pain    Time In / Time Out: 3669-0884    Timed Code/Total Treatment Minutes:  37/47, (1) TE, (1) MT, (1) Vaso    Next Progress Note due:  10th visit    Plan of Care Interventions:  [x] Therapeutic Exercise  [x] Modalities:  [x] Therapeutic Activity     [x] Ultrasound  [x] Estim  [] Gait Training      [x] Cervical Traction [] Lumbar Traction  [x] Neuromuscular Re-education    [x] Cold/hotpack [] Iontophoresis   [x] Instruction in HEP      [x] Vasopneumatic   [x] Dry Needling    [x] Manual Therapy               [] Aquatic Therapy              Electronically signed by:  Grzegorz Tucker, ENRIQUE 3/30/2021, 8:17 AM

## 2021-04-01 ENCOUNTER — HOSPITAL ENCOUNTER (OUTPATIENT)
Dept: PHYSICAL THERAPY | Age: 61
Setting detail: THERAPIES SERIES
Discharge: HOME | End: 2021-04-01
Payer: COMMERCIAL

## 2021-04-01 DIAGNOSIS — M79.671 CHRONIC PAIN OF BOTH FEET: ICD-10-CM

## 2021-04-01 DIAGNOSIS — M79.672 CHRONIC PAIN OF BOTH FEET: ICD-10-CM

## 2021-04-01 DIAGNOSIS — G89.29 CHRONIC PAIN OF BOTH FEET: ICD-10-CM

## 2021-04-01 PROCEDURE — 97140 MANUAL THERAPY 1/> REGIONS: CPT

## 2021-04-01 PROCEDURE — 97110 THERAPEUTIC EXERCISES: CPT

## 2021-04-01 RX ORDER — HYDROCODONE BITARTRATE AND ACETAMINOPHEN 5; 325 MG/1; MG/1
1 TABLET ORAL 2 TIMES DAILY PRN
Qty: 60 TABLET | Refills: 0 | Status: SHIPPED | OUTPATIENT
Start: 2021-04-01 | End: 2021-05-19 | Stop reason: SDUPTHER

## 2021-04-01 RX ORDER — OMEPRAZOLE 20 MG/1
20 CAPSULE, DELAYED RELEASE ORAL DAILY
Qty: 90 CAPSULE | Refills: 3 | Status: SHIPPED | OUTPATIENT
Start: 2021-04-01 | End: 2022-07-14 | Stop reason: SDUPTHER

## 2021-04-01 NOTE — FLOWSHEET NOTE
Outpatient Physical Therapy  [place]           [x] Phone: [phone]   Fax: [phone]  [facility]           [] Phone: [phone]   Fax: [phone]        Physical Therapy Daily Treatment Note  Date:  2021    Patient Name:  Brielle Zepeda    :  1960  MRN: [id number]  Restrictions/Precautions:  none  Diagnosis:   Diagnosis: Neck pain  Date of Injury/Surgery:   Treatment Diagnosis: Treatment Diagnosis: LUE weakness, impaired C spine AROM    Insurance/Certification information: PT Insurance Information: [place] - 78 PCY  Referring Physician:  Referring Practitioner: Seymour Case MD  Next Doctor Visit:    Plan of care signed (Y/N):  yes  Outcome Measure: Quick Dash: 34%. NDI:   Visit# / total visits:   4/10  Pain level: 0/10   Goals:       Short term goals  Time Frame for Short term goals: 5 visits  Short term goal 1: Pt will be IND with HEP in order to maximize recovery outside of clinic: GOAL MET 3/26  Short term goal 2: Pt will improve L C spine Rot to 60 deg or higher to normalize w/ R side and aide in ADLs  Long term goals  Time Frame for Long term goals : 10 visits  Long term goal 1: Pt will improve LUE strength to 4+/5 or higher to aide in ADLs  Long term goal 2: Pt will improve BL C spine SB to 35 deg or higher to aide in ADLs  Long term goal 3: Pt will improve Quick Dash to 24% or lower to show [address] and subjective improvement    Summary of Evaluation: Assessment: Pt is a 58-year-old female who presents to therapy with neck pain worsening in 2021, with associated L shoulder pain. Upon assessment, pt does present with impaired C spine AROM, increased cervical musculature tissue tension, LUE weakness, neck and L shoulder pain and reduced independence with ADL completion. PT suspects L RTC involvement at this time due to special test results and location/ type of pain being reported.  Pt would benefit from skilled therapy interventions to address listed impairments, progress toward goal completion and improve ADL/IADL status. PT also warranted to reduce risk for further injury or decline. Subjective:  Pt states she did fairly well after previous session. Did have a little muscle soreness following but only lasted for about an hour. Still wants to wait on taping. Any changes in Ambulatory Summary Sheet? None      Objective:    Prior to today's treatment session, patient was screened for signs and symptoms related to COVID-19 including but not limited to verbally answering questions related to feeling ill, cough, or SOB, along with taking temperature via forehead thermometer. Patient presented with all negative signs and symptoms and had no fever >100 degrees Fahrenheit this date. tissue tension bilat UT    Exercises: (No more than 4 columns)   Exercise/Equipment 3/12/21 #1 3/26/21 #2 Date 3/30/21 #3 4/1/21 #4            WARM UP          UBE  2'/2' 2'/2' 2'/2'          TABLE       Chin tucks X10, 5\" 2X10, 5\" 2X10, 5\" 2X10, 5\"   UT/LS  stretch 2x 30\" ea x30\" ea BL x30\" ea BL x30\" ea BL   scap retraction X10, 3\" X10, 3\" x10, 3\"                     STANDING       IR/ER with T band   10x ea YTB 2x10 R/L YTB   scap retraction with TB    OTB 2x10   Doorway pec stretch    Hands low and mid range 20 sec x2 ea                                    PROPRIOCEPTION                                          MODALITIES              vaso    Declined today due to having to leave. Was provided with bag of ice to take with her. Other Therapeutic Activities/Education:  HEP    Home Exercise Program:  Issued, practiced and pt demo ability to perform 3/12/2021  Added IR and ER with YTB. Provided with handout. Manual Treatments:  STM UT/LS & rhomboids, manual UT and levator stretching , manual rotation, and C spine manual distraction,     Modalities:     `  Communication with other providers:  POC sent 3/11    Assessment:  End pain: 0/10.   Pt demonstrated overall good tolerance to today's session. Reported feeling looser following manual. Pt would continue to benefit from skilled therapy interventions to address remaining impairments, improve mobility and strength and progress toward goal completion while reducing risk for re-injury or further decline.     Plan for Next Session: UBE, C spine AROM, UT/LS stretching, STM and manual, RTC strengthening w/in limits of pain    Time In / Time Out: 0708-0938    Timed Code/Total Treatment Minutes:  44/44, (2) TE, (1) MT,     Next Progress Note due:  10th visit    Plan of Care Interventions:  [x] Therapeutic Exercise  [x] Modalities:  [x] Therapeutic Activity     [x] Ultrasound  [x] Estim  [] Gait Training      [x] Cervical Traction [] Lumbar Traction  [x] Neuromuscular Re-education    [x] Cold/hotpack [] Iontophoresis   [x] Instruction in HEP      [x] Vasopneumatic   [x] Dry Needling    [x] Manual Therapy               [] Aquatic Therapy              Electronically signed by:  Nevaeh Jimenez, ENRIQUE 4/1/2021, 9:49 AM

## 2021-04-06 ENCOUNTER — HOSPITAL ENCOUNTER (OUTPATIENT)
Dept: PHYSICAL THERAPY | Age: 61
Setting detail: THERAPIES SERIES
Discharge: HOME | End: 2021-04-06
Payer: COMMERCIAL

## 2021-04-06 PROCEDURE — 97140 MANUAL THERAPY 1/> REGIONS: CPT

## 2021-04-06 PROCEDURE — 97110 THERAPEUTIC EXERCISES: CPT

## 2021-04-06 NOTE — FLOWSHEET NOTE
Outpatient Physical Therapy  [place]           [x] Phone: [phone]   Fax: [phone]  [facility]           [] Phone: [phone]   Fax: [phone]        Physical Therapy Daily Treatment Note  Date:  2021    Patient Name:  Nick Ferguson    :  1960  MRN: [id number]  Restrictions/Precautions:  none  Diagnosis:   Diagnosis: Neck pain  Date of Injury/Surgery:   Treatment Diagnosis: Treatment Diagnosis: LUE weakness, impaired C spine AROM    Insurance/Certification information: PT Insurance Information: [place] - 78 PCY  Referring Physician:  Referring Practitioner: Radha Myles MD  Next Doctor Visit:    Plan of care signed (Y/N):  yes  Outcome Measure: Quick Dash: 34%. NDI:   Visit# / total visits:   5/10  Pain level: 0/10   Goals:       Short term goals  Time Frame for Short term goals: 5 visits  Short term goal 1: Pt will be IND with HEP in order to maximize recovery outside of clinic: GOAL MET 3/26  Short term goal 2: Pt will improve L C spine Rot to 60 deg or higher to normalize w/ R side and aide in ADLs  Long term goals  Time Frame for Long term goals : 10 visits  Long term goal 1: Pt will improve LUE strength to 4+/5 or higher to aide in ADLs  Long term goal 2: Pt will improve BL C spine SB to 35 deg or higher to aide in ADLs  Long term goal 3: Pt will improve Quick Dash to 24% or lower to show [address] and subjective improvement    Summary of Evaluation: Assessment: Pt is a 78-year-old female who presents to therapy with neck pain worsening in 2021, with associated L shoulder pain. Upon assessment, pt does present with impaired C spine AROM, increased cervical musculature tissue tension, LUE weakness, neck and L shoulder pain and reduced independence with ADL completion. PT suspects L RTC involvement at this time due to special test results and location/ type of pain being reported.  Pt would benefit from skilled therapy interventions to address listed impairments, progress toward goal completion and improve ADL/IADL status. PT also warranted to reduce risk for further injury or decline. Subjective:  Pt came in today reporting of no pain but still noticing tightness in her left UT. Any changes in Ambulatory Summary Sheet? None      Objective:    Prior to today's treatment session, patient was screened for signs and symptoms related to COVID-19 including but not limited to verbally answering questions related to feeling ill, cough, or SOB, along with taking temperature via forehead thermometer. Patient presented with all negative signs and symptoms and had no fever >100 degrees Fahrenheit this date. Tissue tension bilat UT L>R    Exercises: (No more than 4 columns)   Exercise/Equipment 3/26/21 #2 Date 3/30/21 #3 4/1/21 #4 4/6/21 #5            WARM UP          UBE 2'/2' 2'/2' 2'/2' 2'/2'          TABLE       Chin tucks 2X10, 5\" 2X10, 5\" 2X10, 5\" 2X10, 5\" supine   UT/LS  stretch x30\" ea BL x30\" ea BL x30\" ea BL x30\" ea BL   scap retraction X10, 3\" x10, 3\"                      STANDING       IR/ER with T band  10x ea YTB 2x10 R/L YTB 2x10 R/L YTB   scap retraction with TB   OTB 2x10 OTB 2x10   Doorway pec stretch   Hands low and mid range 20 sec x2 ea Hands low and mid range 20 sec x2 ea                                    PROPRIOCEPTION                                          MODALITIES              vaso   Declined today due to having to leave. Was provided with bag of ice to take with her. 10 min       Other Therapeutic Activities/Education:  HEP    Home Exercise Program:  Issued, practiced and pt demo ability to perform 3/12/2021  Added IR and ER with YTB. Provided with handout.     Manual Treatments:  STM UT/LS & rhomboids, manual UT and levator stretching , manual rotation, and C spine manual distraction,     Modalities:  10' vaso L shoulder in sitting low pressure: no adverse skin reactions or complications after tx   `  Communication with other providers:  POC

## 2021-04-08 ENCOUNTER — HOSPITAL ENCOUNTER (OUTPATIENT)
Dept: PHYSICAL THERAPY | Age: 61
Setting detail: THERAPIES SERIES
Discharge: HOME | End: 2021-04-08
Payer: COMMERCIAL

## 2021-04-08 PROCEDURE — 97110 THERAPEUTIC EXERCISES: CPT

## 2021-04-08 PROCEDURE — 97016 VASOPNEUMATIC DEVICE THERAPY: CPT

## 2021-04-08 PROCEDURE — 97140 MANUAL THERAPY 1/> REGIONS: CPT

## 2021-04-08 NOTE — FLOWSHEET NOTE
progress toward goal completion and improve ADL/IADL status. PT also warranted to reduce risk for further injury or decline. Subjective:  Pt states her right arm is sore this morning from getting first Covid vaccine. Does feel her neck is moving better but UT still feels tight. Any changes in Ambulatory Summary Sheet? None      Objective:    Prior to today's treatment session, patient was screened for signs and symptoms related to COVID-19 including but not limited to verbally answering questions related to feeling ill, cough, or SOB, along with taking temperature via forehead thermometer. Patient presented with all negative signs and symptoms and had no fever >100 degrees Fahrenheit this date. Tissue tension bilat UT L>R  cerv A/Rot: R = 61 deg, L = 52 deg    Exercises: (No more than 4 columns)   Exercise/Equipment Date 3/30/21 #3 4/1/21 #4 4/6/21 #5 4/8/21 #6            WARM UP          UBE 2'/2' 2'/2' 2'/2' 2'/2'          TABLE       Chin tucks 2X10, 5\" 2X10, 5\" 2X10, 5\" supine 2X10, 5\" supine   UT/LS  stretch x30\" ea BL x30\" ea BL x30\" ea BL manual   scap retraction x10, 3\"                       STANDING       IR/ER with T band 10x ea YTB 2x10 R/L YTB 2x10 R/L YTB 2x10 R/L RTB   scap retraction with TB  OTB 2x10 OTB 2x10 GTB 2x10   Doorway pec stretch  Hands low and mid range 20 sec x2 ea Hands low and mid range 20 sec x2 ea Hands low and mid range 20 sec x2 ea                                    PROPRIOCEPTION                                          MODALITIES              vaso  Declined today due to having to leave. Was provided with bag of ice to take with her. 10 min 10 min       Other Therapeutic Activities/Education:  HEP. Home Exercise Program:  Issued, practiced and pt demo ability to perform 3/12/2021  Added IR and ER with YTB. Provided with handout.     Manual Treatments:  STM UT/LS & rhomboids, manual UT and levator stretching , manual rotation, and C spine manual distraction Modalities:  10' vaso L shoulder in sitting low pressure: no adverse skin reactions or complications after tx   `  Communication with other providers:  POC sent 3/11    Assessment:  End pain: 0/10. Pt demonstrated overall good tolerance to today's session. Reported feeling looser following manual. Cerv Rot to left still limited but has improved 10 deg since beginning therapy. Pt would continue to benefit from skilled therapy interventions to address remaining impairments, improve mobility and strength and progress toward goal completion while reducing risk for re-injury or further decline.     Plan for Next Session: UBE, C spine AROM, UT/LS stretching, STM and manual, RTC strengthening w/in limits of pain    Time In / Time Out: 2345-4061    Timed Code/Total Treatment Minutes:  45/55, (2) TE, (1) MT, (1) vaso    Next Progress Note due:  10th visit    Plan of Care Interventions:  [x] Therapeutic Exercise  [x] Modalities:  [x] Therapeutic Activity     [x] Ultrasound  [x] Estim  [] Gait Training      [x] Cervical Traction [] Lumbar Traction  [x] Neuromuscular Re-education    [x] Cold/hotpack [] Iontophoresis   [x] Instruction in HEP      [x] Vasopneumatic   [x] Dry Needling    [x] Manual Therapy               [] Aquatic Therapy              Electronically signed by:  Jo Castaneda PTA 4/8/2021, 8:17 AM

## 2021-04-13 ENCOUNTER — HOSPITAL ENCOUNTER (OUTPATIENT)
Dept: PHYSICAL THERAPY | Age: 61
Setting detail: THERAPIES SERIES
Discharge: HOME | End: 2021-04-13
Payer: COMMERCIAL

## 2021-04-13 PROCEDURE — 97140 MANUAL THERAPY 1/> REGIONS: CPT

## 2021-04-13 PROCEDURE — 97110 THERAPEUTIC EXERCISES: CPT

## 2021-04-13 NOTE — PROGRESS NOTES
Outpatient Physical Therapy           [place]           [x] Phone: [phone]   Fax: [phone]  [place]           [] Phone: [phone]   Fax: [phone]      To:   Sonny Castro MD    From: Ana Tesfaye, PT, DPT     Patient: Tosha Roberts     : 1960  Diagnosis:    Neck pain    Date: 2021  Treatment Diagnosis:   LUE weakness, impaired C spine AROM         [x]  Progress Note                []  Discharge Note    Evaluation Date:  21  Total Visits to date:   7 Cancels/No-shows to date:  0    Subjective:  Pt states she is well this date. Overall she feels she has improved ~97% since therapy start. She feels she is only slightly still limited with some of her turning mobility and some discomfort. She is able to complete her ADLs with more ease and can turn her head to look while driving better as well. Quick Dash: 2.27%       Plan of Care/Treatment to date:  [x] Therapeutic Exercise    [x] Modalities:  [x] Therapeutic Activity     [x] Ultrasound  [x] Electrical Stimulation  [] Gait Training      [x] Cervical Traction   [] Lumbar Traction  [x] Neuromuscular Re-education  [x] Cold/hotpack [] Iontophoresis  [x] Instruction in HEP      Other:  [x] Manual Therapy       [x]  Vasopneumatic  [] Aquatic Therapy       [x]   Dry Needle Therapy                      Objective/Significant Findings At Last Visit/Comments:    Strength LUE:   L Shoulder Flexion: 4+/5  L Shoulder ABduction: 4+/5  L Shoulder Internal Rotation: 4+/5  L Shoulder External Rotation: 4/5  L Elbow Flexion: 4+/5  L Elbow Extension: 4+/5     Cervical: Flex: WFL ext: WFL R SB: 34 L SB: 32 R Rot: 61  L Rot: 53  Positive median nerve tension test BL (L > R)    Assessment:  Pt has shown good progress since therapy start with improved C spine ROM, improved BUE strength, reduced overall pain and improved neck mobility.  Pt has shown good goal progression and is having more independence with ADL tasks since therapy start. Pt would continue to benefit from skilled therapy interventions to address remaining impairments, improve mobility and strength and progress toward goal completion while reducing risk for re-injury or further decline. Goal Status:  [] Achieved [x] Partially Achieved  [] Not Achieved     Changes to goals:    Short term goals  Time Frame for Short term goals: 5 visits  Short term goal 1: Pt will be IND with HEP in order to maximize recovery outside of clinic: GOAL ReMET 4/13  Short term goal 2: Pt will improve L C spine Rot to 60 deg or higher to normalize w/ R side and aide in ADLs: Progressing 4/13  Long term goals  Time Frame for Long term goals : 10 visits  Long term goal 1: Pt will improve LUE strength to 4+/5 or higher to aide in ADLs: Progressing 4/13  Long term goal 2: Pt will improve BL C spine SB to 35 deg or higher to aide in ADLs: Progressing 4/13  Long term goal 3: Pt will improve Quick Dash to 24% or lower to show [address] and subjective improvement: Goal MET 4/13            Frequency/Duration:  # Days per week: [] 1 day # Weeks: [] 1 week [] 4 weeks [] 8 weeks     [x] 2 days   [] 2 weeks [] 5 weeks [] 10 weeks     [] 3 days   [x] 3 weeks [] 6 weeks [] 12 weeks       Rehab Potential: [] Excellent [x] Good [] Fair  [] Poor         Patient Status: [] Continue per initial plan of Care     [] Patient now discharged     [x] Additional visits requested, Please re-certify for additional visits:      Requested frequency/duration:  2 /week for 3 weeks    If we are requesting more visits, we fully anticipate the patient's condition is expected to improve within the treatment timeframe we are requesting. Electronically signed by:  Nieves Cortez PT, ROLANDO 4/13/2021, 6:48 AM    If you have any questions or concerns, please don't hesitate to call.   Thank you for your referral.    Physician Signature:______________________ Date:______ Time: ________  By signing above, therapists plan is approved by physician

## 2021-04-13 NOTE — FLOWSHEET NOTE
Outpatient Physical Therapy  [place]           [x] Phone: [phone]   Fax: [phone]  [place]           [] Phone: [phone]   Fax: [phone]        Physical Therapy Daily Treatment Note  Date:  2021    Patient Name:  Nette Odom    :  1960  MRN: [id number]  Restrictions/Precautions:  none  Diagnosis:   Diagnosis: Neck pain  Date of Injury/Surgery:   Treatment Diagnosis: Treatment Diagnosis: LUE weakness, impaired C spine AROM    Insurance/Certification information: PT Insurance Information: [place] - 78 PCY  Referring Physician:  Referring Practitioner: Melanie Fischer MD  Next Doctor Visit:    Plan of care signed (Y/N):  yes  Outcome Measure: Quick Dash: 2.27% on . NDI:   Visit# / total visits:     Pain level: 0/10   Goals:       Short term goals  Time Frame for Short term goals: 5 visits  Short term goal 1: Pt will be IND with HEP in order to maximize recovery outside of clinic: GOAL ReMET   Short term goal 2: Pt will improve L C spine Rot to 60 deg or higher to normalize w/ R side and aide in ADLs: Progressing   Long term goals  Time Frame for Long term goals : 10 visits  Long term goal 1: Pt will improve LUE strength to 4+/5 or higher to aide in ADLs: Progressing   Long term goal 2: Pt will improve BL C spine SB to 35 deg or higher to aide in ADLs: Progressing   Long term goal 3: Pt will improve Quick Dash to 24% or lower to show [address] and subjective improvement: Goal MET     Summary of Evaluation: Assessment: Pt is a 18-year-old female who presents to therapy with neck pain worsening in 2021, with associated L shoulder pain. Upon assessment, pt does present with impaired C spine AROM, increased cervical musculature tissue tension, LUE weakness, neck and L shoulder pain and reduced independence with ADL completion. PT suspects L RTC involvement at this time due to special test results and location/ type of pain being reported.  Pt would benefit from skilled therapy interventions to address listed impairments, progress toward goal completion and improve ADL/IADL status. PT also warranted to reduce risk for further injury or decline. Subjective:  Pt states she is well this date. Overall she feels she has improved ~97% since therapy start. She feels she is only slightly still limited with some of her turning mobility and some discomfort. She is able to complete her ADLs with more ease and can turn her head to look while driving better as well. Quick Dash: 2.27%    Any changes in Ambulatory Summary Sheet? None      Objective:    Prior to today's treatment session, patient was screened for signs and symptoms related to COVID-19 including but not limited to verbally answering questions related to feeling ill, cough, or SOB, along with taking temperature via forehead thermometer. Patient presented with all negative signs and symptoms and had no fever >100 degrees Fahrenheit this date.      Strength LUE:   L Shoulder Flexion: 4+/5  L Shoulder ABduction: 4+/5  L Shoulder Internal Rotation: 4+/5  L Shoulder External Rotation: 4/5  L Elbow Flexion: 4+/5  L Elbow Extension: 4+/5    Cervical: Flex: WFL ext: WFL R SB: 34 L SB: 32 R Rot: 61  L Rot: 53  Positive median nerve tension test BL (L > R)    Exercises: (No more than 4 columns)   Exercise/Equipment 4/6/21 #5 4/8/21 #6 4/13/21 #7           WARM UP         UBE 2'/2' 2'/2' 2'/2'         TABLE      Chin tucks 2X10, 5\" supine 2X10, 5\" supine 2x10   UT/LS  stretch x30\" ea BL manual    scap retraction                     STANDING      IR/ER with T band 2x10 R/L YTB 2x10 R/L RTB x10 ea BUE OTB   scap retraction with TB OTB 2x10 GTB 2x10 GTB 2x10   Doorway pec stretch Hands low and mid range 20 sec x2 ea Hands low and mid range 20 sec x2 ea    Median nerve glides   x30 pumps ea side - given as HEP                          PROPRIOCEPTION                                    MODALITIES            vaso 10

## 2021-04-15 ENCOUNTER — HOSPITAL ENCOUNTER (OUTPATIENT)
Dept: PHYSICAL THERAPY | Age: 61
Setting detail: THERAPIES SERIES
Discharge: HOME | End: 2021-04-15
Payer: COMMERCIAL

## 2021-04-15 PROCEDURE — 97140 MANUAL THERAPY 1/> REGIONS: CPT

## 2021-04-15 PROCEDURE — 97110 THERAPEUTIC EXERCISES: CPT

## 2021-04-15 NOTE — FLOWSHEET NOTE
would benefit from skilled therapy interventions to address listed impairments, progress toward goal completion and improve ADL/IADL status. PT also warranted to reduce risk for further injury or decline. Subjective:  Pt states she had a bad night last night but not due to pain. Just had trouble getting comfortable to fall asleep. Not really having any pain this morning. Any changes in Ambulatory Summary Sheet? None      Objective:    Prior to today's treatment session, patient was screened for signs and symptoms related to COVID-19 including but not limited to verbally answering questions related to feeling ill, cough, or SOB. Patient presented with all negative signs and symptoms. Strength LUE:   L Shoulder Flexion: 4+/5  L Shoulder ABduction: 4+/5  L Shoulder Internal Rotation: 4+/5  L Shoulder External Rotation: 4/5  L Elbow Flexion: 4+/5  L Elbow Extension: 4+/5  Quick Dash: 2.27%  Cervical: Flex: WFL ext: WFL R SB: 34 L SB: 32 R Rot: 61  L Rot: 53  Positive median nerve tension test BL (L > R)    Exercises: (No more than 4 columns)   Exercise/Equipment 4/8/21 #6 4/13/21 #7 4/15/21 #8             WARM UP          UBE 2'/2' 2'/2' 2'/2'           TABLE       Chin tucks 2X10, 5\" supine 2x10 2x10    UT/LS  stretch manual      scap retraction                        STANDING       IR/ER with T band 2x10 R/L RTB x10 ea BUE OTB x10 ea BUE OTB    scap retraction with TB GTB 2x10 GTB 2x10 GTB 2x10 Blue   Doorway pec stretch Hands low and mid range 20 sec x2 ea  Hands low and mid range 20 sec x2 ea    Median nerve glides  x30 pumps ea side - given as HEP x30 pumps ea side                              PROPRIOCEPTION                                          MODALITIES          HPx 10 min     vaso 10 min 10'         Other Therapeutic Activities/Education:  HEP and continuation of POC/ dc plans     Home Exercise Program:  Issued, practiced and pt demo ability to perform 3/12/2021  Added IR and ER with [facility]. Provided with handout. Manual Treatments:  STM UT/LS & rhomboids (L>R), manual UT and levator stretching , manual rotation, and C spine manual distraction,     Modalities:    HP x 10 min left shld / UT today. Pt wanted to try heat instead of cold. `  Communication with other providers:  POC sent 3/11    Assessment:  Pt demonstrated overall good tolerance to today's session without adverse reaction. Reports of no pain following session. Cont with therapy per POC. Pt has shown good progress since therapy start with improved C spine ROM, improved BUE strength, reduced overall pain and improved neck mobility. Pt has shown good goal progression and is having more independence with ADL tasks since therapy start. Pt would continue to benefit from skilled therapy interventions to address remaining impairments, improve mobility and strength and progress toward goal completion while reducing risk for re-injury or further decline.   End pain: 0/10    Plan for Next Session: UBE, C spine AROM, UT/LS stretching, STM and manual, RTC strengthening w/in limits of pain    Time In / Time Out: 0815 - 0910     Timed Code/Total Treatment Minutes:  45/55, (2) TE, (1) MT    Next Progress Note due:  10th visit    Plan of Care Interventions:  [x] Therapeutic Exercise  [x] Modalities:  [x] Therapeutic Activity     [x] Ultrasound  [x] Estim  [] Gait Training      [x] Cervical Traction [] Lumbar Traction  [x] Neuromuscular Re-education    [x] Cold/hotpack [] Iontophoresis   [x] Instruction in HEP      [x] Vasopneumatic   [x] Dry Needling    [x] Manual Therapy               [] Aquatic Therapy              Electronically signed by:  Nba Smith PTA 4/15/2021, 8:15 AM

## 2021-04-20 ENCOUNTER — HOSPITAL ENCOUNTER (OUTPATIENT)
Dept: PHYSICAL THERAPY | Age: 61
Setting detail: THERAPIES SERIES
Discharge: HOME | End: 2021-04-20
Payer: COMMERCIAL

## 2021-04-20 PROCEDURE — 97110 THERAPEUTIC EXERCISES: CPT

## 2021-04-20 PROCEDURE — 97140 MANUAL THERAPY 1/> REGIONS: CPT

## 2021-04-20 NOTE — FLOWSHEET NOTE
Outpatient Physical Therapy  [place]           [x] Phone: [phone]   Fax: [phone]  [place]           [] Phone: [phone]   Fax: [phone]        Physical Therapy Daily Treatment Note  Date:  2021    Patient Name:  Janessa Tovar    :  1960  MRN: [id number]  Restrictions/Precautions:  none  Diagnosis:   Diagnosis: Neck pain  Date of Injury/Surgery:   Treatment Diagnosis: Treatment Diagnosis: LUE weakness, impaired C spine AROM    Insurance/Certification information: PT Insurance Information: [place] - 78 PCY  Referring Physician:  Referring Practitioner: Troy López MD  Next Doctor Visit:    Plan of care signed (Y/N):  yes  Outcome Measure: Quick Dash: 2.27% on . NDI:   Visit# / total visits:     Pain level: 0/10   Goals:       Short term goals  Time Frame for Short term goals: 5 visits  Short term goal 1: Pt will be IND with HEP in order to maximize recovery outside of clinic: GOAL ReMET   Short term goal 2: Pt will improve L C spine Rot to 60 deg or higher to normalize w/ R side and aide in ADLs: Progressing   Long term goals  Time Frame for Long term goals : 10 visits  Long term goal 1: Pt will improve LUE strength to 4+/5 or higher to aide in ADLs: Progressing   Long term goal 2: Pt will improve BL C spine SB to 35 deg or higher to aide in ADLs: Progressing   Long term goal 3: Pt will improve Quick Dash to 24% or lower to show [address] and subjective improvement: Goal MET     Summary of Evaluation: Assessment: Pt is a 68-year-old female who presents to therapy with neck pain worsening in 2021, with associated L shoulder pain. Upon assessment, pt does present with impaired C spine AROM, increased cervical musculature tissue tension, LUE weakness, neck and L shoulder pain and reduced independence with ADL completion. PT suspects L RTC involvement at this time due to special test results and location/ type of pain being reported.  Pt levator stretching , manual rotation, and C spine manual distraction,     Modalities:    HP x 10 min left shld / UT today. `  Communication with other providers:  POC sent 3/11    Assessment:  Pt demonstrated overall good tolerance to today's session without adverse reaction. Reports of no pain following session. Cont with therapy per POC. End pain: 0/10     Pt has shown good progress since therapy start with improved C spine ROM, improved BUE strength, reduced overall pain and improved neck mobility. Pt has shown good goal progression and is having more independence with ADL tasks since therapy start. Pt would continue to benefit from skilled therapy interventions to address remaining impairments, improve mobility and strength and progress toward goal completion while reducing risk for re-injury or further decline.   End pain: 0/10    Plan for Next Session: UBE, C spine AROM, UT/LS stretching, STM and manual, RTC strengthening w/in limits of pain    Time In / Time Out: 0815 - 0905     Timed Code/Total Treatment Minutes:  40/50, (2) TE, (1) MT, hot pack not billed    Next Progress Note due:  10th visit    Plan of Care Interventions:  [x] Therapeutic Exercise  [x] Modalities:  [x] Therapeutic Activity     [x] Ultrasound  [x] Estim  [] Gait Training      [x] Cervical Traction [] Lumbar Traction  [x] Neuromuscular Re-education    [x] Cold/hotpack [] Iontophoresis   [x] Instruction in HEP      [x] Vasopneumatic   [x] Dry Needling    [x] Manual Therapy               [] Aquatic Therapy              Electronically signed by:  Lizet Taylor PTA 4/20/2021, 8:15 AM

## 2021-04-22 ENCOUNTER — HOSPITAL ENCOUNTER (OUTPATIENT)
Dept: PHYSICAL THERAPY | Age: 61
Setting detail: THERAPIES SERIES
Discharge: HOME | End: 2021-04-22
Payer: COMMERCIAL

## 2021-04-22 PROCEDURE — 97110 THERAPEUTIC EXERCISES: CPT

## 2021-04-22 PROCEDURE — 97140 MANUAL THERAPY 1/> REGIONS: CPT

## 2021-04-22 NOTE — FLOWSHEET NOTE
Outpatient Physical Therapy  [place]           [x] Phone: [phone]   Fax: [phone]  [place]           [] Phone: [phone]   Fax: [phone]        Physical Therapy Daily Treatment Note  Date:  2021    Patient Name:  Cedrick Joaquin    :  1960  MRN: [id number]  Restrictions/Precautions:  none  Diagnosis:   Diagnosis: Neck pain  Date of Injury/Surgery:   Treatment Diagnosis: Treatment Diagnosis: LUE weakness, impaired C spine AROM    Insurance/Certification information: PT Insurance Information: [place] - 78 PCY  Referring Physician:  Referring Practitioner: Brenna Cantu MD  Next Doctor Visit:    Plan of care signed (Y/N):  yes  Outcome Measure: Quick Dash: 2.27% on . NDI:   Visit# / total visits:   10/16  Pain level: 0/10   Goals:       Short term goals  Time Frame for Short term goals: 5 visits  Short term goal 1: Pt will be IND with HEP in order to maximize recovery outside of clinic: GOAL ReMET   Short term goal 2: Pt will improve L C spine Rot to 60 deg or higher to normalize w/ R side and aide in ADLs: Progressing   Long term goals  Time Frame for Long term goals : 10 visits  Long term goal 1: Pt will improve LUE strength to 4+/5 or higher to aide in ADLs: Progressing   Long term goal 2: Pt will improve BL C spine SB to 35 deg or higher to aide in ADLs: Progressing   Long term goal 3: Pt will improve Quick Dash to 24% or lower to show [address] and subjective improvement: Goal MET     Summary of Evaluation: Assessment: Pt is a 49-year-old female who presents to therapy with neck pain worsening in 2021, with associated L shoulder pain. Upon assessment, pt does present with impaired C spine AROM, increased cervical musculature tissue tension, LUE weakness, neck and L shoulder pain and reduced independence with ADL completion. PT suspects L RTC involvement at this time due to special test results and location/ type of pain being reported.  Pt would benefit from skilled therapy interventions to address listed impairments, progress toward goal completion and improve ADL/IADL status. PT also warranted to reduce risk for further injury or decline. Subjective:  Pt reports of just tightness in her left UT, Levator area this morning. Currently not having any pain. Any changes in Ambulatory Summary Sheet? None      Objective:    Prior to today's treatment session, patient was screened for signs and symptoms related to COVID-19 including but not limited to verbally answering questions related to feeling ill, cough, or SOB. Patient presented with all negative signs and symptoms.      Strength LUE:   L Shoulder Flexion: 4+/5  L Shoulder ABduction: 4+/5  L Shoulder Internal Rotation: 4+/5  L Shoulder External Rotation: 4/5  L Elbow Flexion: 4+/5  L Elbow Extension: 4+/5  Quick Dash: 2.27%  Cervical: Flex: WFL ext: WFL R SB: 34 L SB: 32 R Rot: 61  L Rot: 53  Positive median nerve tension test BL (L > R)    Exercises: (No more than 4 columns)   Exercise/Equipment 4/13/21 #7 4/15/21 #8 4/20/21 #9 4/22/21 #10            WARM UP          UBE 2'/2' 2'/2' 2'/2' 2'/2'          TABLE       Chin tucks 2x10 2x10 2x10 Supine 2x10   UT/LS  stretch    Levator stretch 30 sec x 2   scap retraction                        STANDING       IR/ER with T band x10 ea BUE OTB x10 ea BUE OTB 2x10 ea BUE OTB 2x10 ea BUE OTB   scap retraction with TB GTB 2x10 GTB 2x10 Blue 2x10 Blue 2x10   Low row    OTB 1x10   Doorway pec stretch  Hands low and mid range 20 sec x2 ea Hands low and mid range 20 sec x2 ea Hands low and mid range 20 sec x2 ea   Median nerve glides x30 pumps ea side - given as HEP x30 pumps ea side HEP HEP                             PROPRIOCEPTION                                          MODALITIES         HPx 10 min  HPx 10 min HP x 10 min   vaso 10'          Other Therapeutic Activities/Education:  HEP and continuation of POC/ dc plans     Home Exercise Program:

## 2021-05-07 ENCOUNTER — HOSPITAL ENCOUNTER (OUTPATIENT)
Dept: PHYSICAL THERAPY | Age: 61
Setting detail: THERAPIES SERIES
Discharge: HOME | End: 2021-05-07
Payer: COMMERCIAL

## 2021-05-07 PROCEDURE — 20560 NDL INSJ W/O NJX 1 OR 2 MUSC: CPT

## 2021-05-07 PROCEDURE — 97535 SELF CARE MNGMENT TRAINING: CPT

## 2021-05-07 PROCEDURE — 97530 THERAPEUTIC ACTIVITIES: CPT

## 2021-05-07 NOTE — FLOWSHEET NOTE
Outpatient Physical Therapy  [place]           [x] Phone: [phone]   Fax: [phone]  [facility]           [] Phone: [phone]   Fax: [phone]        Physical Therapy Daily Treatment Note  Date:  2021    Patient Name:  Brielle Zepeda    :  1960  MRN: [id number]  Restrictions/Precautions:  none  Diagnosis:   Diagnosis: Neck pain  Date of Injury/Surgery:   Treatment Diagnosis: Treatment Diagnosis: LUE weakness, impaired C spine AROM    Insurance/Certification information: PT Insurance Information: [employer] - 78 PCY  Referring Physician:  Referring Practitioner: Seymour Case MD  Next Doctor Visit:    Plan of care signed (Y/N):  yes  Outcome Measure: Quick Dash: 2.27% on . NDI:   Visit# / total visits:     Pain level: 0/10   Goals:       Short term goals  Time Frame for Short term goals: 5 visits  Short term goal 1: Pt will be IND with HEP in order to maximize recovery outside of clinic: GOAL ReMET   Short term goal 2: Pt will improve L C spine Rot to 60 deg or higher to normalize w/ R side and aide in ADLs: Progressing   Long term goals  Time Frame for Long term goals : 10 visits  Long term goal 1: Pt will improve LUE strength to 4+/5 or higher to aide in ADLs: Progressing   Long term goal 2: Pt will improve BL C spine SB to 35 deg or higher to aide in ADLs: Progressing   Long term goal 3: Pt will improve Quick Dash to 24% or lower to show [address] and subjective improvement: Goal MET     Summary of Evaluation: Assessment: Pt is a 76-year-old female who presents to therapy with neck pain worsening in 2021, with associated L shoulder pain. Upon assessment, pt does present with impaired C spine AROM, increased cervical musculature tissue tension, LUE weakness, neck and L shoulder pain and reduced independence with ADL completion. PT suspects L RTC involvement at this time due to special test results and location/ type of pain being reported.  Pt would benefit from skilled therapy interventions to address listed impairments, progress toward goal completion and improve ADL/IADL status. PT also warranted to reduce risk for further injury or decline. Subjective:   Had high blood pressure last night. Has been on BP meds for years. Did not sleep well last night; worried about the BP and had ice tea at dinner. Overall, patient feels she has improved approx 97% since beginning therapy. Has just one area that won't relax left neck (points to levator and UT.)  Denies radicular pain into hand. Performing HEP inconsistently. Always does neck turns and chin tucks. Any changes in Ambulatory Summary Sheet?   None      Objective:    COVID screening questions were asked and patient attested that there had been no contact or symptoms       Strength LUE:   L Shoulder Flexion: 4+/5  L Shoulder ABduction: 4+/5  L Shoulder Internal Rotation: 4+/5  L Shoulder External Rotation: 4/5  L Elbow Flexion: 4+/5  L Elbow Extension: 4+/5  Quick Dash: 2.27%  Cervical: Flex: WFL ext: WFL R SB: 34 L SB: 32 R Rot: 61  L Rot: 53  Positive median nerve tension test BL (L > R)    5/07/21:  Muscle tension and slight discomfort LUT/levator w/turning head L > R  Latent trigger points (discomfort w/palpation, no discomfort otherwise)        Exercises: (No more than 4 columns)   Exercise/Equipment 4/20/21 #9 4/22/21 #10 5/07/21  #11        DNT   WARM UP         UBE 2'/2' 2'/2'          TABLE      Chin tucks 2x10 Supine 2x10    UT/LS  stretch  Levator stretch 30 sec x 2    scap retraction                     STANDING      IR/ER with T band 2x10 ea BUE OTB 2x10 ea BUE OTB    scap retraction with TB Blue 2x10 Blue 2x10    Low row  OTB 1x10    Doorway pec stretch Hands low and mid range 20 sec x2 ea Hands low and mid range 20 sec x2 ea    Median nerve glides HEP HEP                           PROPRIOCEPTION                                    MODALITIES       HPx 10 min HP x 10 min    vaso Other Therapeutic Activities/Education:  HEP and continuation of POC/ dc plans   5/07/21:  Pt referred to [facility], PT for trial of dry needling d/t lingering myofascial tightness. DNT Acknowledgement form was reviewed/signed previously. Pt notified that mild needle soreness x 1-2 days may be present and is an expected side effect. Mild bruising at needle site(s) may also occur. Patient wished to proceed w/DNT. Home Exercise Program:  Issued, practiced and pt demo ability to perform 3/12/2021  Added IR and ER with YTB. Provided with handout. Manual Treatments:       Modalities:    DNT    Communication with other providers:  POC sent 3/11    Assessment:    Pt demonstrated overall good tolerance to today's session of DNT without adverse reaction. Reports of no pain and less tightness following session. Doing well with HEP. Cont with therapy per POC. End pain: 0/10     Pt has shown good progress since therapy start with improved C spine ROM, improved BUE strength, reduced overall pain and improved neck mobility. Pt has shown good goal progression and is having more independence with ADL tasks since therapy start. Pt would continue to benefit from skilled therapy interventions to address remaining impairments, improve mobility and strength and progress toward goal completion while reducing risk for re-injury or further decline.   End pain: 0/10    Plan for Next Session: UBE, C spine AROM, UT/LS stretching, STM and manual, RTC strengthening w/in limits of pain    Time In / Time Out: 0815/0900     Timed Code/Total Treatment Minutes:  45'/45'      Next Progress Note due:  10th visit    Plan of Care Interventions:  [x] Therapeutic Exercise  [x] Modalities:  [x] Therapeutic Activity     [x] Ultrasound  [x] Estim  [] Gait Training      [x] Cervical Traction [] Lumbar Traction  [x] Neuromuscular Re-education    [x] Cold/hotpack [] Iontophoresis   [x] Instruction in HEP      [x] Vasopneumatic   [x] Dry Needling    [x] Manual Therapy               [] Aquatic Therapy              Electronically signed by:  Elizabeth Timmons, PT 5/7/2021, 8:03 AM

## 2021-05-13 ENCOUNTER — HOSPITAL ENCOUNTER (OUTPATIENT)
Dept: PHYSICAL THERAPY | Age: 61
Setting detail: THERAPIES SERIES
Discharge: HOME | End: 2021-05-13
Payer: COMMERCIAL

## 2021-05-13 PROCEDURE — 97110 THERAPEUTIC EXERCISES: CPT

## 2021-05-13 NOTE — PROGRESS NOTES
Outpatient Physical Therapy           [place]           [x] Phone: [phone]   Fax: [phone]  [facility]           [] Phone: [phone]   Fax: [phone]      To:   Melanie Fischer MD    From: Miguel Ángel Esquivel, PT, DPT     Patient: Nette Odom     : 1960   Diagnosis:    Neck pain    Date: 2021  Treatment Diagnosis:   LUE weakness, impaired C spine AROM         []  Progress Note                [x]  Discharge Note    Evaluation Date:  21  Total Visits to date:   15 Cancels/No-shows to date:  0    Subjective: Pt states she is doing well this date. She states that overall she is still about 97% better since eval. She did feel the needling helped some but not enough to continue past today. She is having no limitations with ADL activity at this point other than slightly reduced neck motion when turning to look over her shoulder in the car. She is not having any limitations with UEs or ADLs at home. She is not having pain. She agrees w/ dc plans this date. Quick Dash: 2.27%. NDI: 0/50       Plan of Care/Treatment to date:  [x] Therapeutic Exercise    [x] Modalities:  [x] Therapeutic Activity     [x] Ultrasound  [x] Electrical Stimulation  [] Gait Training      [x] Cervical Traction   [] Lumbar Traction  [x] Neuromuscular Re-education  [x] Cold/hotpack [] Iontophoresis  [x] Instruction in HEP      Other:  [x] Manual Therapy       [x]  Vasopneumatic  [] Aquatic Therapy       [x]   Dry Needle Therapy                      Objective/Significant Findings At Last Visit/Comments:    Strength LUE:   L Shoulder Flexion: 4+/5  L Shoulder ABduction: 4+/5  L Shoulder Internal Rotation: 4+/5  L Shoulder External Rotation: 4+/5  L Elbow Flexion: 4+/5  L Elbow Extension: 4+/5     Cervical: Flex: WFL ext: WFL R SB: 34 L SB: 33 R Rot: 61  L Rot: 54    Assessment:  Pt has shown good improvement overall since therapy start with ROM at C spine, UE strength, and independence with ADLs.  Pt has met almost all goals at this time and was only slightly behind ROM goal for C spine. She is only having mild limitation with turning her head to end range in the car, but no further limitations with ADLs and can still drive even with slightly limited motion. She agrees w/ dc plans this date and PT educated pt on continuation of HEP after discharge for max benefits and reduced risk for future decline. Pt dc this date. Goal Status:  [x] Achieved [x] Partially Achieved  [] Not Achieved     Changes to goals:    Short term goals  Time Frame for Short term goals: 5 visits  Short term goal 1: Pt will be IND with HEP in order to maximize recovery outside of clinic: GOAL ReMET 5/13  Short term goal 2: Pt will improve L C spine Rot to 60 deg or higher to normalize w/ R side and aide in ADLs: Almost met 5/13  Long term goals  Time Frame for Long term goals : 10 visits  Long term goal 1: Pt will improve LUE strength to 4+/5 or higher to aide in ADLs: GOAL MET 5/13  Long term goal 2: Pt will improve BL C spine SB to 35 deg or higher to aide in ADLs: Almost met 5/13  Long term goal 3: Pt will improve Quick Dash to 24% or lower to show [address] and subjective improvement: Goal ReMET 5/13    Patient Status: [] Continue per initial plan of Care     [x] Patient now discharged     [] Additional visits requested, Please re-certify for additional visits    If we are requesting more visits, we fully anticipate the patient's condition is expected to improve within the treatment timeframe we are requesting. Electronically signed by:  Barney Tapia, PT, DPT 5/13/2021, 9:42 AM    If you have any questions or concerns, please don't hesitate to call.   Thank you for your referral.    Physician Signature:______________________ Date:______ Time: ________  By signing above, therapists plan is approved by physician

## 2021-05-13 NOTE — FLOWSHEET NOTE
Outpatient Physical Therapy  [place]           [x] Phone: [phone]   Fax: [phone]  [facility]           [] Phone: [phone]   Fax: [phone]        Physical Therapy Daily Treatment Note  Date:  2021    Patient Name:  Heidi Cohen    :  1960  MRN: [id number]  Restrictions/Precautions:  none  Diagnosis:   Diagnosis: Neck pain  Date of Injury/Surgery:   Treatment Diagnosis: Treatment Diagnosis: LUE weakness, impaired C spine AROM    Insurance/Certification information: PT Insurance Information: [employer] - 78 PCY  Referring Physician:  Referring Practitioner: Sina Angeles MD  Next Doctor Visit:     Plan of care signed (Y/N):  yes  Outcome Measure: Quick Dash: 2.27%. NDI: 0/50  Visit# / total visits:     Pain level: 0/10   Goals:       Short term goals  Time Frame for Short term goals: 5 visits  Short term goal 1: Pt will be IND with HEP in order to maximize recovery outside of clinic: GOAL ReMET   Short term goal 2: Pt will improve L C spine Rot to 60 deg or higher to normalize w/ R side and aide in ADLs: Almost met   Long term goals  Time Frame for Long term goals : 10 visits  Long term goal 1: Pt will improve LUE strength to 4+/5 or higher to aide in ADLs: GOAL MET   Long term goal 2: Pt will improve BL C spine SB to 35 deg or higher to aide in ADLs: Almost met   Long term goal 3: Pt will improve Quick Dash to 24% or lower to show [address] and subjective improvement: Goal ReMET     Summary of Evaluation: Assessment: Pt is a 69-year-old female who presents to therapy with neck pain worsening in 2021, with associated L shoulder pain. Upon assessment, pt does present with impaired C spine AROM, increased cervical musculature tissue tension, LUE weakness, neck and L shoulder pain and reduced independence with ADL completion. PT suspects L RTC involvement at this time due to special test results and location/ type of pain being reported.  Pt would benefit from skilled therapy interventions to address listed impairments, progress toward goal completion and improve ADL/IADL status. PT also warranted to reduce risk for further injury or decline. Subjective: Pt states she is doing well this date. She states that overall she is still about 97% better since eval. She did feel the needling helped some but not enough to continue past today. She is having no limitations with ADL activity at this point other than slightly reduced neck motion when turning to look over her shoulder in the car. She is not having any limitations with UEs or ADLs at home. She is not having pain. She agrees w/ dc plans this date. Quick Dash: 2.27%. NDI: 0/50    Any changes in Ambulatory Summary Sheet?   None      Objective:    COVID screening questions were asked and patient attested that there had been no contact or symptoms    Strength LUE:   L Shoulder Flexion: 4+/5  L Shoulder ABduction: 4+/5  L Shoulder Internal Rotation: 4+/5  L Shoulder External Rotation: 4+/5  L Elbow Flexion: 4+/5  L Elbow Extension: 4+/5     Cervical: Flex: WFL ext: WFL R SB: 34 L SB: 33 R Rot: 61  L Rot: 54      Exercises: (No more than 4 columns)   Exercise/Equipment 4/22/21 #10 5/07/21  #11 5/13/21 #12       DNT    WARM UP         UBE 2'/2'  2'/2'         TABLE      Chin tucks Supine 2x10  Sitting x10, 3\"   UT/LS  stretch Levator stretch 30 sec x 2  2x30\" ea BLE   scap retraction                     STANDING      IR/ER with T band 2x10 ea BUE OTB     scap retraction with TB Blue 2x10  Blue TB 2x10   Low row OTB 1x10     Doorway pec stretch Hands low and mid range 20 sec x2 ea  Hands low and mid range 30 sec x2 ea   Median nerve glides HEP                            PROPRIOCEPTION                                    MODALITIES       HP x 10 min     vaso          Other Therapeutic Activities/Education:  HEP and continuation after dc    Home Exercise Program:  Issued, practiced and pt demo ability to perform 3/12/2021  Added IR and ER with YTB. Provided with handout. Manual Treatments:       Modalities:    DNT    Communication with other providers:  POC sent 3/11    Assessment:  Pt tolerated today's treatment without any adverse reactions or complications this date. Pt has shown good improvement overall since therapy start with ROM at C spine, UE strength, and independence with ADLs. Pt has met almost all goals at this time and was only slightly behind ROM goal for C spine. She is only having mild limitation with turning her head to end range in the car, but no further limitations with ADLs and can still drive even with slightly limited motion. She agrees w/ dc plans this date and PT educated pt on continuation of HEP after discharge for max benefits and reduced risk for future decline. Pt dc this date.   End pain: 0/10    Plan for Next Session: UBE, C spine AROM, UT/LS stretching, STM and manual, RTC strengthening w/in limits of pain    Time In / Time Out: 2193 - 1176    Timed Code/Total Treatment Minutes:   30': 30' TE x2     Next Progress Note due:  dc    Plan of Care Interventions:  [x] Therapeutic Exercise  [x] Modalities:  [x] Therapeutic Activity     [x] Ultrasound  [x] Estim  [] Gait Training      [x] Cervical Traction [] Lumbar Traction  [x] Neuromuscular Re-education    [x] Cold/hotpack [] Iontophoresis   [x] Instruction in HEP      [x] Vasopneumatic   [x] Dry Needling    [x] Manual Therapy               [] Aquatic Therapy              Electronically signed by:  Catracho Padron PT, DPT 5/13/2021, 9:39 AM

## 2021-05-19 ENCOUNTER — HOSPITAL ENCOUNTER (OUTPATIENT)
Age: 61
Discharge: HOME | End: 2021-05-19
Payer: COMMERCIAL

## 2021-05-19 DIAGNOSIS — E78.1 HYPERTRIGLYCERIDEMIA: ICD-10-CM

## 2021-05-19 DIAGNOSIS — G89.29 CHRONIC PAIN OF BOTH FEET: ICD-10-CM

## 2021-05-19 DIAGNOSIS — R73.03 PREDIABETES: ICD-10-CM

## 2021-05-19 DIAGNOSIS — E55.9 VITAMIN D DEFICIENCY: ICD-10-CM

## 2021-05-19 DIAGNOSIS — M79.671 CHRONIC PAIN OF BOTH FEET: ICD-10-CM

## 2021-05-19 DIAGNOSIS — M79.672 CHRONIC PAIN OF BOTH FEET: ICD-10-CM

## 2021-05-19 DIAGNOSIS — I10 ESSENTIAL HYPERTENSION: ICD-10-CM

## 2021-05-19 LAB
BASOPHILS ABSOLUTE: 0 K/CU MM
BASOPHILS RELATIVE PERCENT: 0.3 % (ref 0–1)
CHOLESTEROL: 198 MG/DL
DIFFERENTIAL TYPE: ABNORMAL
EOSINOPHILS ABSOLUTE: 0.2 K/CU MM
EOSINOPHILS RELATIVE PERCENT: 2.1 % (ref 0–3)
ESTIMATED AVERAGE GLUCOSE: 120 MG/DL
HBA1C MFR BLD: 5.8 % (ref 4.2–6.3)
HCT VFR BLD CALC: 43.3 % (ref 37–47)
HDLC SERPL-MCNC: 39 MG/DL
HEMOGLOBIN: 14.3 GM/DL (ref 12.5–16)
IMMATURE NEUTROPHIL %: 0.4 % (ref 0–0.43)
LDL CHOLESTEROL DIRECT: 112 MG/DL
LYMPHOCYTES ABSOLUTE: 2.7 K/CU MM
LYMPHOCYTES RELATIVE PERCENT: 34.9 % (ref 24–44)
MCH RBC QN AUTO: 28.5 PG (ref 27–31)
MCHC RBC AUTO-ENTMCNC: 33 % (ref 32–36)
MCV RBC AUTO: 86.3 FL (ref 78–100)
MONOCYTES ABSOLUTE: 0.6 K/CU MM
MONOCYTES RELATIVE PERCENT: 8.1 % (ref 0–4)
NUCLEATED RBC %: 0 %
PDW BLD-RTO: 13.9 % (ref 11.7–14.9)
PLATELET # BLD: 261 K/CU MM (ref 140–440)
PMV BLD AUTO: 10.4 FL (ref 7.5–11.1)
RBC # BLD: 5.02 M/CU MM (ref 4.2–5.4)
SEGMENTED NEUTROPHILS ABSOLUTE COUNT: 4.2 K/CU MM
SEGMENTED NEUTROPHILS RELATIVE PERCENT: 54.2 % (ref 36–66)
TOTAL IMMATURE NEUTOROPHIL: 0.03 K/CU MM
TOTAL NUCLEATED RBC: 0 K/CU MM
TRIGL SERPL-MCNC: 399 MG/DL
VITAMIN D 25-HYDROXY: 36.3 NG/ML
WBC # BLD: 7.7 K/CU MM (ref 4–10.5)

## 2021-05-19 PROCEDURE — 83721 ASSAY OF BLOOD LIPOPROTEIN: CPT

## 2021-05-19 PROCEDURE — 85025 COMPLETE CBC W/AUTO DIFF WBC: CPT

## 2021-05-19 PROCEDURE — 83036 HEMOGLOBIN GLYCOSYLATED A1C: CPT

## 2021-05-19 PROCEDURE — 80061 LIPID PANEL: CPT

## 2021-05-19 PROCEDURE — 82306 VITAMIN D 25 HYDROXY: CPT

## 2021-05-19 PROCEDURE — 36415 COLL VENOUS BLD VENIPUNCTURE: CPT

## 2021-05-20 RX ORDER — FENOFIBRIC ACID 135 MG/1
CAPSULE, DELAYED RELEASE ORAL
Qty: 90 CAPSULE | Refills: 3 | Status: SHIPPED | OUTPATIENT
Start: 2021-05-20 | End: 2022-07-14 | Stop reason: SDUPTHER

## 2021-05-20 RX ORDER — HYDROCODONE BITARTRATE AND ACETAMINOPHEN 5; 325 MG/1; MG/1
1 TABLET ORAL 2 TIMES DAILY PRN
Qty: 60 TABLET | Refills: 0 | Status: SHIPPED | OUTPATIENT
Start: 2021-05-20 | End: 2021-07-19

## 2021-05-27 ENCOUNTER — OFFICE VISIT (OUTPATIENT)
Dept: INTERNAL MEDICINE CLINIC | Age: 61
End: 2021-05-27
Payer: COMMERCIAL

## 2021-05-27 VITALS
BODY MASS INDEX: 31.64 KG/M2 | HEART RATE: 76 BPM | OXYGEN SATURATION: 98 % | TEMPERATURE: 97.7 F | WEIGHT: 173 LBS | SYSTOLIC BLOOD PRESSURE: 128 MMHG | DIASTOLIC BLOOD PRESSURE: 72 MMHG

## 2021-05-27 DIAGNOSIS — G89.29 CHRONIC PAIN OF BOTH FEET: ICD-10-CM

## 2021-05-27 DIAGNOSIS — I10 ESSENTIAL HYPERTENSION: Primary | ICD-10-CM

## 2021-05-27 DIAGNOSIS — M79.671 CHRONIC PAIN OF BOTH FEET: ICD-10-CM

## 2021-05-27 DIAGNOSIS — E78.2 MIXED HYPERLIPIDEMIA: ICD-10-CM

## 2021-05-27 DIAGNOSIS — R73.03 PREDIABETES: ICD-10-CM

## 2021-05-27 DIAGNOSIS — M79.672 CHRONIC PAIN OF BOTH FEET: ICD-10-CM

## 2021-05-27 PROCEDURE — 99214 OFFICE O/P EST MOD 30 MIN: CPT | Performed by: FAMILY MEDICINE

## 2021-05-27 RX ORDER — DILTIAZEM HYDROCHLORIDE 300 MG/1
300 CAPSULE, COATED, EXTENDED RELEASE ORAL DAILY
COMMUNITY
End: 2022-07-14 | Stop reason: SDUPTHER

## 2021-05-27 ASSESSMENT — ENCOUNTER SYMPTOMS
ABDOMINAL PAIN: 0
SHORTNESS OF BREATH: 0
VOMITING: 0
COLOR CHANGE: 0
CONSTIPATION: 0
DIARRHEA: 0
CHEST TIGHTNESS: 0
SORE THROAT: 0

## 2021-05-27 NOTE — PROGRESS NOTES
occasionally        Review of Systems   Constitutional: Negative for activity change, appetite change, chills, fever and unexpected weight change. HENT: Negative for congestion and sore throat. Respiratory: Negative for chest tightness and shortness of breath. Cardiovascular: Negative for chest pain and palpitations. Gastrointestinal: Negative for abdominal pain, constipation, diarrhea and vomiting. Genitourinary: Negative for dysuria. Musculoskeletal: Positive for arthralgias, myalgias and neck pain. Skin: Negative for color change. Neurological: Negative for dizziness and light-headedness. Psychiatric/Behavioral: Negative for dysphoric mood. The patient is not nervous/anxious. Prior to Visit Medications    Medication Sig Taking? Authorizing Provider   HYDROcodone-acetaminophen (NORCO) 5-325 MG per tablet Take 1 tablet by mouth 2 times daily as needed for Pain (back pain) for up to 60 days.   Melanie Fischer MD   choline fenofibrate (TRILIPIX) 135 MG CPDR delayed release capsule TAKE ONE CAPSULE BY MOUTH EVERY DAY  Melanie Fischer MD   omeprazole (PRILOSEC) 20 MG delayed release capsule Take 1 capsule by mouth Daily  Melanie Fischer MD   tiZANidine (ZANAFLEX) 2 MG tablet Take 1-2 tabs by mouth at night as needed for neck pain  Melanie Fischer MD   losartan (COZAAR) 100 MG tablet   Historical Provider, MD   albuterol sulfate HFA (VENTOLIN HFA) 108 (90 Base) MCG/ACT inhaler Inhale 2 puffs into the lungs every 6 hours as needed for Wheezing or Shortness of Breath  Melanie Fischer MD   dilTIAZem (CARDIZEM CD) 240 MG extended release capsule TAKE ONE (1) CAPSULE BY MOUTH ONCE DAILY  Melanie Fischer MD   famciclovir (FAMVIR) 500 MG tablet Take 3 tablets as a single dose as directed  Melissa Saravia MD   VASCEPA 1 g CAPS capsule Take 1 capsule by mouth 2 times daily  Melissa Saravia MD   Coenzyme Q10 (CO Q 10 PO) Take by mouth as needed   Historical Provider, MD modifications, continue current regimen. Will monitor. 3. Chronic pain of both feet  Improving with exercises, patient interested in trying to wean off of norco soon. Will discuss management options at follow up. Patient voiced understanding and agreement with plan. All questions/concerns were addressed, risks/side effects of medications were reviewed. Return precautions and after visit summary were provided. Return in about 4 months (around 9/27/2021). or earlier as needed.       Belinda Alvarez MD

## 2021-08-10 DIAGNOSIS — M79.672 CHRONIC PAIN OF BOTH FEET: ICD-10-CM

## 2021-08-10 DIAGNOSIS — M79.671 CHRONIC PAIN OF BOTH FEET: ICD-10-CM

## 2021-08-10 DIAGNOSIS — G89.29 CHRONIC PAIN OF BOTH FEET: ICD-10-CM

## 2021-08-10 RX ORDER — HYDROCODONE BITARTRATE AND ACETAMINOPHEN 5; 325 MG/1; MG/1
1 TABLET ORAL 2 TIMES DAILY PRN
Qty: 60 TABLET | Refills: 0 | Status: SHIPPED | OUTPATIENT
Start: 2021-08-10 | End: 2021-09-28 | Stop reason: SDUPTHER

## 2021-09-28 ENCOUNTER — TELEPHONE (OUTPATIENT)
Dept: INTERNAL MEDICINE CLINIC | Age: 61
End: 2021-09-28

## 2021-09-28 DIAGNOSIS — G89.29 CHRONIC PAIN OF BOTH FEET: ICD-10-CM

## 2021-09-28 DIAGNOSIS — M79.671 CHRONIC PAIN OF BOTH FEET: ICD-10-CM

## 2021-09-28 DIAGNOSIS — M79.672 CHRONIC PAIN OF BOTH FEET: ICD-10-CM

## 2021-09-28 RX ORDER — HYDROCODONE BITARTRATE AND ACETAMINOPHEN 5; 325 MG/1; MG/1
1 TABLET ORAL 2 TIMES DAILY PRN
Qty: 60 TABLET | Refills: 0 | Status: SHIPPED | OUTPATIENT
Start: 2021-09-28 | End: 2022-02-17 | Stop reason: SDUPTHER

## 2021-09-28 NOTE — TELEPHONE ENCOUNTER
Pharmacy called back stating the pt never picked up the script from 8/10. States legally if they do not  after 14 days they have to have a new RX. I am pending the norco to be sent to them. Please advise.

## 2021-09-28 NOTE — TELEPHONE ENCOUNTER
Pt states that she did not receive refill of norco on 8/10/21 she said she called pharmacy and they stated they never got the script. Pt would like the refill, can someone reach out to her and advise what she should do?

## 2021-10-04 ENCOUNTER — OFFICE VISIT (OUTPATIENT)
Dept: INTERNAL MEDICINE CLINIC | Age: 61
End: 2021-10-04
Payer: COMMERCIAL

## 2021-10-04 VITALS
BODY MASS INDEX: 31.83 KG/M2 | HEIGHT: 62 IN | HEART RATE: 73 BPM | WEIGHT: 173 LBS | OXYGEN SATURATION: 94 % | TEMPERATURE: 97.7 F | SYSTOLIC BLOOD PRESSURE: 132 MMHG | DIASTOLIC BLOOD PRESSURE: 74 MMHG

## 2021-10-04 DIAGNOSIS — G89.29 CHRONIC PAIN OF BOTH FEET: ICD-10-CM

## 2021-10-04 DIAGNOSIS — S46.211A STRAIN OF RIGHT BICEPS, INITIAL ENCOUNTER: Primary | ICD-10-CM

## 2021-10-04 DIAGNOSIS — Z23 NEEDS FLU SHOT: ICD-10-CM

## 2021-10-04 DIAGNOSIS — M79.672 CHRONIC PAIN OF BOTH FEET: ICD-10-CM

## 2021-10-04 DIAGNOSIS — R73.03 PREDIABETES: ICD-10-CM

## 2021-10-04 DIAGNOSIS — E78.2 MIXED HYPERLIPIDEMIA: ICD-10-CM

## 2021-10-04 DIAGNOSIS — I10 ESSENTIAL HYPERTENSION: ICD-10-CM

## 2021-10-04 DIAGNOSIS — M79.671 CHRONIC PAIN OF BOTH FEET: ICD-10-CM

## 2021-10-04 PROCEDURE — 90471 IMMUNIZATION ADMIN: CPT | Performed by: FAMILY MEDICINE

## 2021-10-04 PROCEDURE — 90694 VACC AIIV4 NO PRSRV 0.5ML IM: CPT | Performed by: FAMILY MEDICINE

## 2021-10-04 PROCEDURE — 99214 OFFICE O/P EST MOD 30 MIN: CPT | Performed by: FAMILY MEDICINE

## 2021-10-04 ASSESSMENT — ENCOUNTER SYMPTOMS
COLOR CHANGE: 0
VOMITING: 0
SHORTNESS OF BREATH: 0
CONSTIPATION: 0
SORE THROAT: 0
DIARRHEA: 0
ABDOMINAL PAIN: 0
CHEST TIGHTNESS: 0

## 2021-10-04 NOTE — PROGRESS NOTES
Vaccine Information Sheet, \"Influenza - Inactivated\"  given to Charis Griffin, or parent/legal guardian of  Charis Griffin and verbalized understanding. Patient responses:    Have you ever had a reaction to a flu vaccine? Yes  Are you able to eat eggs without adverse effects? Yes  Do you have any current illness? No  Have you ever had Guillian [place] Syndrome? No    Flu vaccine given per order. Please see immunization tab.

## 2021-10-04 NOTE — PROGRESS NOTES
Subjective:      Chief Complaint   Patient presents with    Follow-up    Arm Injury     hurt right bicep        HPI:  Mary Grace Moyer is a 59 y.o. female who presents today for follow up of chronic conditions as listed below. HLP:  Taking same regimen, takes trilipix as tolerated. Chronic foot pain:  Still taking norco as needed. Has been trying to minimize as much as possible. Tries to take a half tab if able, or takes tylenol if symptoms are not severe- does not ever take more than 2 tabs norco/day. HTN:  Has not been checking her BP's regularly because of her recent move, but normally does. Denies any symptoms. Arm pain:  States she recently moved, and strained her R bicep about 2 months ago while lifting boxes. Went to an urgent care for ear pain a few weeks ago and was prescribed oral steroids, which also helped her arm pain. Finished treatment a few weeks ago. States arm was doing well while she was on vacation, but since restarting work, pain has started to recur. Radiates from elbow into upper arm. Otherwise feeling well today, no acute concerns. Labs reviewed.         Past Medical History:   Diagnosis Date    Fatty liver     by US    Fibromyalgia     GERD (gastroesophageal reflux disease)     by UGI    Gout     Hyperlipidemia     Hypertension     Hypoglycemia     IBS (irritable bowel syndrome)     Prolonged emergence from general anesthesia     PSVT (paroxysmal supraventricular tachycardia) (HCC)     s/p ablation        Past Surgical History:   Procedure Laterality Date    ABDOMEN SURGERY       Cholecystectomy    ABDOMEN SURGERY      Rt ovarian adhesions removed and cervical laser therapy/Dr Katie Muniz ABDOMEN SURGERY      Rt ovarian cyst removed    ABLATION OF DYSRHYTHMIC FOCUS      APPENDECTOMY      BONE CYST EXCISION  1980    Left bone cyst aneurysm removed    CHOLECYSTECTOMY      COLONOSCOPY  1/19/2016    diverticulosis    FOOT NEUROMA SURGERY Right  TONSILLECTOMY         Social History     Tobacco Use    Smoking status: Never Smoker    Smokeless tobacco: Never Used   Substance Use Topics    Alcohol use: Yes     Comment: occasionally        Review of Systems   Constitutional: Negative for activity change, appetite change, chills, fever and unexpected weight change. HENT: Negative for congestion and sore throat. Respiratory: Negative for chest tightness and shortness of breath. Cardiovascular: Negative for chest pain and palpitations. Gastrointestinal: Negative for abdominal pain, constipation, diarrhea and vomiting. Genitourinary: Negative for dysuria. Musculoskeletal: Positive for arthralgias and myalgias. Skin: Negative for color change. Neurological: Negative for dizziness and light-headedness. Psychiatric/Behavioral: Negative for dysphoric mood. The patient is not nervous/anxious. Prior to Visit Medications    Medication Sig Taking? Authorizing Provider   HYDROcodone-acetaminophen (NORCO) 5-325 MG per tablet Take 1 tablet by mouth 2 times daily as needed for Pain (back pain) for up to 60 days.  Yes Bryan Bravo MD   dilTIAZem (DILTIAZEM CD) 300 MG extended release capsule Take 300 mg by mouth daily Yes Historical Provider, MD   choline fenofibrate (TRILIPIX) 135 MG CPDR delayed release capsule TAKE ONE CAPSULE BY MOUTH EVERY DAY Yes Bryan Bravo MD   omeprazole (PRILOSEC) 20 MG delayed release capsule Take 1 capsule by mouth Daily Yes Bryan Bravo MD   losartan (COZAAR) 100 MG tablet Take 100 mg by mouth daily  Yes Historical Provider, MD   famciclovir (FAMVIR) 500 MG tablet Take 3 tablets as a single dose as directed Yes Glynn Castleman, MD   Coenzyme Q10 (CO Q 10 PO) Take by mouth as needed  Yes Historical Provider, MD   Fexofenadine HCl (ALLEGRA PO) Take by mouth as needed  Yes Historical Provider, MD   Cholecalciferol (VITAMIN D3) 250 MCG (08333 UT) TABS Take by mouth daily  Yes Historical Provider, MD   Multiple Vitamins-Minerals (THERAPEUTIC MULTIVIT/MINERAL PO) Take 1 tablet by mouth daily as needed  Yes Historical Provider, MD   albuterol sulfate HFA (VENTOLIN HFA) 108 (90 Base) MCG/ACT inhaler Inhale 2 puffs into the lungs every 6 hours as needed for Wheezing or Shortness of Breath  Patient not taking: Reported on 5/27/2021  Shahbaz Arenas MD          Objective:      /74 (Site: Left Upper Arm, Position: Sitting, Cuff Size: Medium Adult)   Pulse 73   Temp 97.7 °F (36.5 °C)   Ht 5' 2\" (1.575 m)   Wt 173 lb (78.5 kg)   SpO2 94%   BMI 31.64 kg/m²      Physical Exam  Vitals and nursing note reviewed. Constitutional:       General: She is not in acute distress. Appearance: Normal appearance. She is not ill-appearing or toxic-appearing. HENT:      Head: Normocephalic and atraumatic. Right Ear: External ear normal.      Left Ear: External ear normal.      Nose: Nose normal.      Mouth/Throat:      Pharynx: Oropharynx is clear. Eyes:      General: No scleral icterus. Right eye: No discharge. Left eye: No discharge. Extraocular Movements: Extraocular movements intact. Conjunctiva/sclera: Conjunctivae normal.   Cardiovascular:      Rate and Rhythm: Normal rate and regular rhythm. Heart sounds: Normal heart sounds. Pulmonary:      Effort: Pulmonary effort is normal.      Breath sounds: Normal breath sounds. No wheezing or rales. Musculoskeletal:         General: No swelling, tenderness or deformity. Normal range of motion. Cervical back: Normal range of motion and neck supple. No rigidity. Comments: Mild pain with R elbow extension against resistance but 5/5 strength, no tenderness or deformity at biceps tendon   Skin:     General: Skin is warm and dry. Findings: No rash. Neurological:      General: No focal deficit present. Mental Status: She is alert. Mental status is at baseline. Motor: No weakness.

## 2021-10-19 ENCOUNTER — HOSPITAL ENCOUNTER (OUTPATIENT)
Dept: PHYSICAL THERAPY | Age: 61
Setting detail: THERAPIES SERIES
Discharge: HOME | End: 2021-10-19
Payer: COMMERCIAL

## 2021-10-19 PROCEDURE — 97110 THERAPEUTIC EXERCISES: CPT

## 2021-10-19 PROCEDURE — 97016 VASOPNEUMATIC DEVICE THERAPY: CPT

## 2021-10-19 PROCEDURE — 97161 PT EVAL LOW COMPLEX 20 MIN: CPT

## 2021-10-19 ASSESSMENT — PAIN DESCRIPTION - ORIENTATION: ORIENTATION: RIGHT

## 2021-10-19 ASSESSMENT — PAIN DESCRIPTION - DESCRIPTORS: DESCRIPTORS: SHARP;ACHING

## 2021-10-19 ASSESSMENT — PAIN DESCRIPTION - PAIN TYPE: TYPE: ACUTE PAIN

## 2021-10-19 ASSESSMENT — PAIN DESCRIPTION - ONSET: ONSET: SUDDEN

## 2021-10-19 ASSESSMENT — PAIN DESCRIPTION - PROGRESSION: CLINICAL_PROGRESSION: NOT CHANGED

## 2021-10-19 ASSESSMENT — PAIN DESCRIPTION - FREQUENCY: FREQUENCY: CONTINUOUS

## 2021-10-19 ASSESSMENT — PAIN SCALES - GENERAL: PAINLEVEL_OUTOF10: 1

## 2021-10-19 ASSESSMENT — PAIN DESCRIPTION - LOCATION: LOCATION: ARM;SHOULDER;NECK

## 2021-10-19 NOTE — PLAN OF CARE
Outpatient Physical Therapy           [place]           [x] Phone: [phone]   Fax: [phone]  [place] [place]           [] Phone: [phone]   Fax: [phone]     To: Referring Practitioner: Margo Lewis MD    From: Ginger Das PT, PT     Patient: Cathy Woodward       : 1960  Diagnosis: Diagnosis: R bicep strain   Treatment Diagnosis: Treatment Diagnosis: R shoulder pain, stiffness, weakness   Date: 10/19/2021    Physical Therapy Certification/Re-Certification Form  Dear Dr. Mary Jane Segura,   The following patient has been evaluated for physical therapy services and for therapy to continue, insurance requires physician review of the treatment plan initially and every 90 days. Please review the attached evaluation and/or summary of the patient's plan of care, and verify that you agree therapy should continue by signing the attached document and sending it back to our office. Assessment:    Assessment: Pt is a 58 yo female who presents w/ Dx of R bicep strain. She has HX of neck and L shoulder pain but not R. She had some improvement w/ steroids orally but pain returning. She has painful and mildly limited ROM and strength in R UE w/ TTP and limited function, compensatory patterns and + impingement signs. These limitations are affecting her ability to perform her usual ADL's and she will benefit from PT to help restore PLOF. Prior to Aug of this year she had no R shoulder pain. Patient agrees with established plan of care and assisted in the development of their short term and long term goals. Patient had no adverse reaction with initial treatment and there are no barriers to learning. Demonstrates no mental or cognitive disorder.         Plan of Care/Treatment to date:  [x] Therapeutic Exercise  [x] Modalities:  [x] Therapeutic Activity     [] Ultrasound  [] Electrical Stimulation  [] Gait Training      [] Cervical Traction [] Lumbar Traction  [x] Neuromuscular Re-education    [x] Cold/hotpack [] Iontophoresis   [x] Instruction in HEP      [x] Vasopneumatic    [] Dry Needling  [x] Manual Therapy               [] Aquatic Therapy       Other:          Frequency/Duration:  # Days per week: [] 1 day # Weeks: [] 1 week [] 5 weeks     [x] 2 days   [] 2 weeks [x] 6 weeks     [] 3 days   [] 3 weeks [] 7 weeks     [] 4 days   [] 4 weeks [] 8 weeks         [] 9 weeks [] 10 weeks         [] 11 weeks [] 12 weeks    Rehab Potential/Progress: [] Excellent [x] Good [] Fair  [] Poor     Goals:    Patient goals : get rid of pain, learn to manage it and what can/can't do at home  Short term goals  Time Frame for Short term goals: 3 weeks  Short term goal 1: Pt will be IND with HEP in order to maximize recovery outside of clinic  Short term goal 2: Pt will be able to move R arm through full ROM w/ min pain  Long term goals  Time Frame for Long term goals : 6 weeks  Long term goal 1: Pt will be able to manage her pain and continue to progress on her own  Long term goal 2: Pt will have full and painfree AROM and strength R UE for return to PLOF  Long term goal 3: Pt will have improved DASH score by 10% or more      Electronically signed by:  Dontae Jones PT, PT, 10/19/2021, 11:03 AM    10/19/2021, 11:04 AM  If you have any questions or concerns, please don't hesitate to call.   Thank you for your referral.      Physician Signature:________________________________Date:_________ TIME: _____  By signing above, therapists plan is approved by physician

## 2021-10-19 NOTE — FLOWSHEET NOTE
Outpatient Physical Therapy  [place]           [x] Phone: [phone]   Fax: [phone]  [place] [place]           [] Phone: [phone]   Fax: [phone]        Physical Therapy Daily Treatment Note  Date:  10/19/2021    Patient Name:  Verona Burns    :  1960  MRN: [id number]  Restrictions/Precautions: Other position/activity restrictions: none  Diagnosis:   Diagnosis: R bicep strain  Date of Injury/Surgery: Aug   Treatment Diagnosis: Treatment Diagnosis: R shoulder pain, stiffness, weakness    Insurance/Certification information: PT Insurance Information: [employer], 76 PCY, 12 used, $25 copay   Referring Physician:  Referring Practitioner: Scott Aguirre MD  Next Doctor Visit:    Plan of care signed (Y/N):  Pending cosign  Outcome Measure: DASH 23%  Visit# / total visits:    POC  Pain level: 1/10   Goals:     Patient goals : get rid of pain, learn to manage it and what can/can't do at home  Short term goals  Time Frame for Short term goals: 3 weeks  Short term goal 1: Pt will be IND with HEP in order to maximize recovery outside of clinic  Short term goal 2: Pt will be able to move R arm through full ROM w/ min pain  Long term goals  Time Frame for Long term goals : 6 weeks  Long term goal 1: Pt will be able to manage her pain and continue to progress on her own  Long term goal 2: Pt will have full and painfree AROM and strength R UE for return to PLOF  Long term goal 3: Pt will have improved DASH score by 10% or more    Summary of Evaluation: Assessment: Pt is a 58 yo female who presents w/ Dx of R bicep strain. She has HX of neck and L shoulder pain but not R. She had some improvement w/ steroids orally but pain returning. She has painful and mildly limited ROM and strength in R UE w/ TTP and limited function, compensatory patterns and + impingement signs. These limitations are affecting her ability to perform her usual ADL's and she will benefit from PT to help restore PLOF. Prior to Aug of this year she had no R shoulder pain. Subjective:  See eval         Any changes in Ambulatory Summary Sheet? None        Objective:  See eval   COVID screening questions were asked and patient attested that there had been no contact or symptoms        Exercises: (No more than 4 columns)   Exercise/Equipment 10/18/21  #1 Date Date           WARM UP                     TABLE      Press up Cane 10x     Supine flex Cane 10x     Supine ER @90 Cane 10x arm on towel                    STANDING      scap squeeze arms down  Elbows bent 10x  10x     pendulum 30\"     Table slide flex  CW/CCW 10x  --                                  PROPRIOCEPTION                                    MODALITIES      Vaso  10'               Other Therapeutic Activities/Education:  10/19/2021: Patient received education on their current pathology and how their condition effects them with their functional activities. Patient understood discussion and questions were answered. Patient understands their activity limitations and understands rational for treatment progression. Home Exercise Program:  Issued, practiced and pt demo ability to perform 10/19/2021  Access Code: [facility]  URL: [email or web address]  Date: 10/19/2021  Prepared by: Enrrique Urbano    Exercises  Supine Shoulder Press with Dowel - 1-2 x daily - 7 x weekly - 2 sets - 10 reps  Supine Shoulder External Rotation with Dowel - 1-2 x daily - 7 x weekly - 2 sets - 10 reps  Supine Shoulder Flexion Extension AAROM with Dowel - 1-2 x daily - 7 x weekly - 2 sets - 10 reps  Seated Shoulder Flexion Towel Slide at Table Top - 1-2 x daily - 7 x weekly - 2 sets - 10 reps  Shoulder External Rotation and Scapular Retraction - 1-2 x daily - 7 x weekly - 2 sets - 10 reps  Seated Scapular Retraction - 1-2 x daily - 7 x weekly - 2 sets - 10 reps  Standing Scapular Retraction - 1-2 x daily - 7 x weekly - 2 sets - 10 reps  Circular Shoulder Pendulum with Table Support - 1-2 x daily - 7 x weekly - 1 sets - 2-3 reps - 30 seconds hold         Manual Treatments:  Light GH mobs, PROM to brandie R shoulder (NEXT RX ADD SCAP MOBS)      Modalities:  Vaso lo to R shoulder, pt seated w/ arm in pillow, 10'      Communication with other providers:  POC set for cosign 10/19/21      Assessment:   Pt is a 58 yo female who presents w/ Dx of R bicep strain. She has HX of neck and L shoulder pain but not R. She had some improvement w/ steroids orally but pain returning. She has painful and mildly limited ROM and strength in R UE w/ TTP and limited function, compensatory patterns and + impingement signs. These limitations are affecting her ability to perform her usual ADL's and she will benefit from PT to help restore PLOF. Prior to Aug of this year she had no R shoulder pain.       Plan for Next Session:  Begin scap mobs, continue light GH mobs and PROM to brandie, advance ROM to brandie, pain control prn      Time In / Time Out:   0900/0955      Timed Code/Total Treatment Minutes:  20/60  1 TE 25', 1 Vaso 10', 1 eval 25'      Next Progress Note due:  30 days       Plan of Care Interventions:  [x] Therapeutic Exercise  [x] Modalities:  [x] Therapeutic Activity     [] Ultrasound  [] Estim  [] Gait Training      [] Cervical Traction [] Lumbar Traction  [x] Neuromuscular Re-education    [x] Cold/hotpack [] Iontophoresis   [x] Instruction in HEP      [x] Vasopneumatic   [] Dry Needling    [x] Manual Therapy               [] Aquatic Therapy              Electronically signed by:  Gamaliel Baez PT,  PT, MPT, ATC  10/19/2021, 11:01 AM    10/19/2021, 11:03 AM

## 2021-10-19 NOTE — PROGRESS NOTES
Physical Therapy  Initial Assessment  Date: 10/19/2021  Patient Name: Jose Cruz Villalobos  MRN: [id number]  : 1960     Treatment Diagnosis: R shoulder pain, stiffness, weakness    Restrictions  Position Activity Restriction  Other position/activity restrictions: none    Subjective   General  Chart Reviewed: Yes  Patient assessed for rehabilitation services?: Yes  Additional Pertinent Hx: HTN, FMS  Referring Practitioner: Esthela Ricks MD  Diagnosis: R bicep strain  General Comment  Comments: R handed  PT Visit Information  PT Insurance Information: [place], 76 PCY, 12 used, $25 copay  Subjective  Subjective: lifting a lot of boxes, moving, in August of this year, lifting Bible felt a pull/pain in R bicep area. No prior Hx issue w/ R shoulder, but some L and more in UT/neck and this has improved. Increased pain w/ any lifting, raising arm up, reaching across body, rolling onto R side at night but sleep ok, relief w/ rest and steriod helped, but pain returned slowly when done  Pain Screening  Patient Currently in Pain: Yes  Pain Assessment  Pain Assessment: 0-10  Pain Level: 1 (max in last 2 weeks 8/10 but not constant at that level.)  Pain Type: Acute pain  Pain Location: Arm; Shoulder;Neck (sometimes even up to ear)  Pain Orientation: Right  Pain Descriptors: Garon Lyman; Aching  Pain Frequency: Continuous (but spikes w/ activity)  Pain Onset: Sudden  Clinical Progression: Not changed (snice done w/ steroid)  Vital Signs  Patient Currently in Pain: Yes    Vision/Hearing       Orientation  Orientation  Overall Orientation Status: Within Normal Limits    Social/Functional History  Social/Functional History  Lives With: Spouse  Type of Home: House  ADL Assistance: Independent  Homemaking Assistance: Independent  Ambulation Assistance: Independent  Transfer Assistance: Independent  Occupation: Unemployed  Type of occupation: computer work, but let go recently  Leisure & Hobbies: painting and getting house ready currently    Objective     Observation/Palpation  Posture: Fair  Palpation: TTP anterior shoulder more than lateral and posterior  Observation: forward head w/ anterior shoulders    AROM RUE (degrees)  RUE AROM : WFL  RUE General AROM: shoulder painful to ROM w/ end range limits and compensatory patterns but mostly equal, IR and ER scratch 1-2 levels less on R  PROM LUE (degrees)  LUE PROM: WNL  Spine  Cervical: [facility]    Strength RUE  Strength RUE: WFL  Comment: but pain limiting flex and abd, mild on ER, no pain w/ elbow flex  Strength LUE  Strength LUE: WNL     Additional Measures  Special Tests: + impingement more in flex plane on R shoulder, branden cross over, Neg bicep testing     Assessment   Conditions Requiring Skilled Therapeutic Intervention  Body structures, Functions, Activity limitations: Decreased functional mobility ; Decreased ADL status; Decreased ROM; Decreased strength;Decreased high-level IADLs;Decreased posture; Increased pain  Assessment: Pt is a 58 yo female who presents w/ Dx of R bicep strain. She has HX of neck and L shoulder pain but not R. She had some improvement w/ steroids orally but pain returning. She has painful and mildly limited ROM and strength in R UE w/ TTP and limited function, compensatory patterns and + impingement signs. These limitations are affecting her ability to perform her usual ADL's and she will benefit from PT to help restore PLOF. Prior to Aug of this year she had no R shoulder pain. Patient agrees with established plan of care and assisted in the development of their short term and long term goals. Patient had no adverse reaction with initial treatment and there are no barriers to learning. Demonstrates no mental or cognitive disorder. Treatment Diagnosis: R shoulder pain, stiffness, weakness  Prognosis: Good  Decision Making: Low Complexity  Barriers to Learning: none.  style: demo  REQUIRES PT FOLLOW UP: Yes  Treatment Initiated : see flow sheet         Plan Plan  Times per week: 2x  Plan weeks: 6w  Specific instructions for Next Treatment: Begin scap mobs, continue light GH mobs and PROM to brandie, advance ROM to brandie, pain control prn  Current Treatment Recommendations: Strengthening, IADL Training, Home Exercise Program, Neuromuscular Re-education, ROM, Manual Therapy - Soft Tissue Mobilization, Safety Education & Training, Patient/Caregiver Education & Training, Modalities, ADL/Self-care Training, Pain Management, Functional Mobility Training, Manual Therapy - Joint Manipulation       OutComes Score     DASH 23%     Goals  Short term goals  Time Frame for Short term goals: 3 weeks  Short term goal 1: Pt will be IND with HEP in order to maximize recovery outside of clinic  Short term goal 2: Pt will be able to move R arm through full ROM w/ min pain  Long term goals  Time Frame for Long term goals : 6 weeks  Long term goal 1: Pt will be able to manage her pain and continue to progress on her own  Long term goal 2: Pt will have full and painfree AROM and strength R UE for return to PLOF  Long term goal 3: Pt will have improved DASH score by 10% or more  Patient Goals   Patient goals : get rid of pain, learn to manage it and what can/can't do at home       [facility] [place], PT   PT, MPT, ATC     10/19/2021, 11:00 AM

## 2021-10-22 ENCOUNTER — HOSPITAL ENCOUNTER (OUTPATIENT)
Dept: PHYSICAL THERAPY | Age: 61
Setting detail: THERAPIES SERIES
Discharge: HOME | End: 2021-10-22
Payer: COMMERCIAL

## 2021-10-22 PROCEDURE — 97140 MANUAL THERAPY 1/> REGIONS: CPT | Performed by: PHYSICAL THERAPY ASSISTANT

## 2021-10-22 PROCEDURE — 97110 THERAPEUTIC EXERCISES: CPT | Performed by: PHYSICAL THERAPY ASSISTANT

## 2021-10-22 PROCEDURE — 97016 VASOPNEUMATIC DEVICE THERAPY: CPT | Performed by: PHYSICAL THERAPY ASSISTANT

## 2021-10-22 NOTE — FLOWSHEET NOTE
Outpatient Physical Therapy  [place]           [x] Phone: [phone]   Fax: [phone]  Muriel lili           [] Phone: [phone]   Fax: [phone]        Physical Therapy Daily Treatment Note  Date:  10/22/2021    Patient Name:  Chino Lyon    :  1960  MRN: [id number]  Restrictions/Precautions: Other position/activity restrictions: none  Diagnosis:   Diagnosis: R bicep strain  Date of Injury/Surgery: Aug   Treatment Diagnosis: Treatment Diagnosis: R shoulder pain, stiffness, weakness    Insurance/Certification information: PT Insurance Information: [place], 76 PCY, 12 used, $25 copay   Referring Physician:  Referring Practitioner: Delores Lambert MD  Next Doctor Visit:    Plan of care signed (Y/N):  Pending cosign  Outcome Measure: DASH 23%  Visit# / total visits:    POC  Pain level: 2/10   Goals:     Patient goals : get rid of pain, learn to manage it and what can/can't do at home  Short term goals  Time Frame for Short term goals: 3 weeks  Short term goal 1: Pt will be IND with HEP in order to maximize recovery outside of clinic  Short term goal 2: Pt will be able to move R arm through full ROM w/ min pain  Long term goals  Time Frame for Long term goals : 6 weeks  Long term goal 1: Pt will be able to manage her pain and continue to progress on her own  Long term goal 2: Pt will have full and painfree AROM and strength R UE for return to PLOF  Long term goal 3: Pt will have improved DASH score by 10% or more    Summary of Evaluation: Assessment: Pt is a 58 yo female who presents w/ Dx of R bicep strain. She has HX of neck and L shoulder pain but not R. She had some improvement w/ steroids orally but pain returning. She has painful and mildly limited ROM and strength in R UE w/ TTP and limited function, compensatory patterns and + impingement signs. These limitations are affecting her ability to perform her usual ADL's and she will benefit from PT to help restore PLOF. Prior to Aug of this year she had no R shoulder pain. Subjective:   Pt stated the more exercise she does the more the clavicle bothers her with retractions. Any changes in Ambulatory Summary Sheet? None        Objective:     COVID screening questions were asked and patient attested that there had been no contact or symptoms      Scapular muscles o f the right: very reactive to movement in the mid thoracic region. The anterior axial muscles tight at pectoral and the pec minor. Exercises: (No more than 4 columns)   Exercise/Equipment 10/18/21  #1 10/22/21 #2 Date           WARM UP                     TABLE      Press up Cane 10x Cane x12    Supine flex Cane 10x Cane x12    Supine ER @90 Cane 10x arm on [employer]    Thoracic Stretch (seated extn, and SL Open Book)  X 5 reps right SL only, x 5 reps with bolster extn. Prone Scapular retraction    X 10 reps    STANDING      scap squeeze arms down  Elbows bent 10x  10x Standing at wall x 10     pendulum 30\" -    Table slide flex  CW/CCW 10x  -- -          Scap retractions at wall  X 5 reps                     PROPRIOCEPTION                                    MODALITIES      Vaso  10' 10'              Other Therapeutic Activities/Education:  10/19/2021: Patient received education on their current pathology and how their condition effects them with their functional activities. Patient understood discussion and questions were answered. Patient understands their activity limitations and understands rational for treatment progression. 10/22/21:  Educated the pt of the post manual tx and stretch exercise today would expect to have increase in discomfort at ll the right shoulder muscles and along the neck and mid back. Take hot shoulders and drink lots of water with any medication appropriate per doctor for pain. Pt verbalized understanding of the education this date.         Home Exercise Program:  Issued, practiced and pt demo ability to perform 10/19/2021  Access Code: [facility]  URL: [email or web address]  Date: 10/19/2021  Prepared by: Fernanda Sang    Exercises  Supine Shoulder Press with Dowel - 1-2 x daily - 7 x weekly - 2 sets - 10 reps  Supine Shoulder External Rotation with Dowel - 1-2 x daily - 7 x weekly - 2 sets - 10 reps  Supine Shoulder Flexion Extension AAROM with Dowel - 1-2 x daily - 7 x weekly - 2 sets - 10 reps  Seated Shoulder Flexion Towel Slide at Table Top - 1-2 x daily - 7 x weekly - 2 sets - 10 reps  Shoulder External Rotation and Scapular Retraction - 1-2 x daily - 7 x weekly - 2 sets - 10 reps  Seated Scapular Retraction - 1-2 x daily - 7 x weekly - 2 sets - 10 reps  Standing Scapular Retraction - 1-2 x daily - 7 x weekly - 2 sets - 10 reps  Circular Shoulder Pendulum with Table Support - 1-2 x daily - 7 x weekly - 1 sets - 2-3 reps - 30 seconds hold   **Added 10/22/21  Seated thoracic extension repeated x 10 reps  Side lying Open Book stretch 5 x 10 seconds      Manual Treatments:  Light GH mobs, PROM to brandie R shoulder and added the Manual Scapular mobs, framing and the STM along the [place], and the UT stretch. X 20  Min. Modalities:  Vaso Moderate to R shoulder, pt seated w/ arm in pillow, 10' in sitting. Communication with other providers:  POC set for cosign 10/19/21      Assessment:   Pt presents with rounded shoulder, tight muscle anteriorly and rigid thoracic spine. Posture is driving the right shoulder impingement. Scapular muscles of the right: very reactive to movement in the mid thoracic region. The anterior axial muscles tight at pectoral and the pec minor. Post tx: pain level rated  3 /10    Pt is a 58 yo female who presents w/ Dx of R bicep strain. She has HX of neck and L shoulder pain but not R. She had some improvement w/ steroids orally but pain returning.   She has painful and mildly limited ROM and strength in R UE w/ TTP and limited function, compensatory patterns and + impingement signs. These limitations are affecting her ability to perform her usual ADL's and she will benefit from PT to help restore PLOF. Prior to Aug of this year she had no R shoulder pain. Plan for Next Session:  Begin scap mobs, continue light GH mobs and PROM to brandie, advance ROM to brandie, pain control prn      Time In / Time Out:   0900/1008      Timed Code/Total Treatment Minutes: 62' /68'  3 TE 38', 1 Vaso 10', 1 Manual tx 20'      Next Progress Note due:  30 days       Plan of Care Interventions:  [x] Therapeutic Exercise  [x] Modalities:  [x] Therapeutic Activity     [] Ultrasound  [] Estim  [] Gait Training      [] Cervical Traction [] Lumbar Traction  [x] Neuromuscular Re-education    [x] Cold/hotpack [] Iontophoresis   [x] Instruction in HEP      [x] Vasopneumatic   [] Dry Needling    [x] Manual Therapy               [] Aquatic Therapy              Electronically signed by:   Robin Hess PTA  10/22/2021, 6:56 AM    10/22/2021, 6:56 AM

## 2021-10-26 ENCOUNTER — HOSPITAL ENCOUNTER (OUTPATIENT)
Dept: PHYSICAL THERAPY | Age: 61
Setting detail: THERAPIES SERIES
Discharge: HOME | End: 2021-10-26
Payer: COMMERCIAL

## 2021-10-26 PROCEDURE — 97140 MANUAL THERAPY 1/> REGIONS: CPT

## 2021-10-26 PROCEDURE — 97110 THERAPEUTIC EXERCISES: CPT

## 2021-10-26 PROCEDURE — 97016 VASOPNEUMATIC DEVICE THERAPY: CPT

## 2021-10-26 NOTE — FLOWSHEET NOTE
Outpatient Physical Therapy  [place]           [x] Phone: [phone]   Fax: [phone]  Muriel lili           [] Phone: [phone]   Fax: [phone]        Physical Therapy Daily Treatment Note  Date:  10/26/2021    Patient Name:  Lena Pitt    :  1960  MRN: [id number]  Restrictions/Precautions: Other position/activity restrictions: none  Diagnosis:   Diagnosis: R bicep strain  Date of Injury/Surgery: Aug   Treatment Diagnosis: Treatment Diagnosis: R shoulder pain, stiffness, weakness    Insurance/Certification information: PT Insurance Information: [place], 76 PCY, 12 used, $25 copay   Referring Physician:  Referring Practitioner: Devon Allen MD  Next Doctor Visit:    Plan of care signed (Y/N):  yes  Outcome Measure: DASH 23%  Visit# / total visits:   3/12 POC  Pain level: 1/10   Goals:     Patient goals : get rid of pain, learn to manage it and what can/can't do at home  Short term goals  Time Frame for Short term goals: 3 weeks  Short term goal 1: Pt will be IND with HEP in order to maximize recovery outside of clinic  Compliance good so far 10/26  Short term goal 2: Pt will be able to move R arm through full ROM w/ min pain  Progressing  Long term goals  Time Frame for Long term goals : 6 weeks  Long term goal 1: Pt will be able to manage her pain and continue to progress on her own  Long term goal 2: Pt will have full and painfree AROM and strength R UE for return to PLOF  Long term goal 3: Pt will have improved DASH score by 10% or more    Summary of Evaluation: Assessment: Pt is a 58 yo female who presents w/ Dx of R bicep strain. She has HX of neck and L shoulder pain but not R. She had some improvement w/ steroids orally but pain returning. She has painful and mildly limited ROM and strength in R UE w/ TTP and limited function, compensatory patterns and + impingement signs.   These limitations are affecting her ability to perform her usual ADL's and she will benefit from PT to help restore PLOF. Prior to Aug of this year she had no R shoulder pain. Subjective:  Pt was really sore all over from last Rx and new exercises, so needed to rest some yesterday. Still sore today. Max pain 3/10 w/ meds. Any changes in Ambulatory Summary Sheet? None        Objective:   COVID screening questions were asked and patient attested that there had been no contact or symptoms    Pt w/ pec and bicep tightness noted. TTP at pec/bicep R shoulder. Postural deficits and overactive UT noted. Pain w/ end range flex and ER R shoulder and lacks full motion even PROM. Reviewed HEP and go easy, won't change her deficits over night. She did get some cavitations w/ C/T HVLAT w/ min shoulder pain from position. Scap retract is less painful prone. Exercises: (No more than 4 columns)   Exercise/Equipment 10/18/21  #1 10/22/21 #2 10/26/21  #3           WARM UP         UBE F/B  -- 120 rpm 2' ea          TABLE      Press up [employer] 10x Cane x12 Cane 15x    Supine flex Cane 10x Cane x12 Cane 15x    Supine ER @90 Cane 10x arm on towel [employer] 15x    Thoracic Stretch (seated extn, and SL Open Book)  X 5 reps right SL only, x 5 reps with bolster extn. Man today    Prone Scapular retraction    X 10 reps 10x    Open book   -- Did doorway pec today and bicep 5x10\" ea angle               STANDING      scap squeeze arms down  Elbows bent 10x  10x Standing at wall x 10  --   pendulum 30\" - -   Table slide flex  CW/CCW 10x  -- - 15x  15xea          Scap retractions at wall  X 5 reps -                    PROPRIOCEPTION                                    MODALITIES      Vaso  10' 10' 10'             Other Therapeutic Activities/Education:  10/19/2021: Patient received education on their current pathology and how their condition effects them with their functional activities. Patient understood discussion and questions were answered.  Patient understands their activity limitations and understands rational for treatment progression. 10/22/21:  Educated the pt of the post manual tx and stretch exercise today would expect to have increase in discomfort at ll the right shoulder muscles and along the neck and mid back. Take hot shoulders and drink lots of water with any medication appropriate per doctor for pain. Pt verbalized understanding of the education this date. Home Exercise Program:  Issued, practiced and pt demo ability to perform 10/19/2021  Access Code: [facility]  URL: [email or web address]/  Date: 10/19/2021  Prepared by: Lynn Castro    Exercises  Supine Shoulder Press with Dowel - 1-2 x daily - 7 x weekly - 2 sets - 10 reps  Supine Shoulder External Rotation with Dowel - 1-2 x daily - 7 x weekly - 2 sets - 10 reps  Supine Shoulder Flexion Extension AAROM with Dowel - 1-2 x daily - 7 x weekly - 2 sets - 10 reps  Seated Shoulder Flexion Towel Slide at Table Top - 1-2 x daily - 7 x weekly - 2 sets - 10 reps  Shoulder External Rotation and Scapular Retraction - 1-2 x daily - 7 x weekly - 2 sets - 10 reps  Seated Scapular Retraction - 1-2 x daily - 7 x weekly - 2 sets - 10 reps  Standing Scapular Retraction - 1-2 x daily - 7 x weekly - 2 sets - 10 reps  Circular Shoulder Pendulum with Table Support - 1-2 x daily - 7 x weekly - 1 sets - 2-3 reps - 30 seconds hold   **Added 10/22/21  Seated thoracic extension repeated x 10 reps  Side lying Open Book stretch 5 x 10 seconds      Manual Treatments:  Seated HVLAT for C/T and upper thor, prone thor PA mobs, Light GH mobs, PROM to brandie R shoulder and added the Manual Scapular mobs, light pec/bicep STM       Modalities:  Vaso Moderate to R shoulder, pt seated w/ arm in pillow, 10' in sitting. Communication with other providers:  POC set for cosign 10/19/21      Assessment:   Pt tolerated Rx fairly well, but does have some pain/soreness after man/exercise that improves after ice.   She demonstrates tightness anterior upper quarter w/ weakness posterior and UT compensations, this is affecting her shoulder function and creating limited ROM and strength. She will benefit from PT to help reduce pain and tightness, improve strength, mechanics and function to help restore PLOF. Pain after ice about same as when arrived.         Post tx: pain level rated  1-2/10        Plan for Next Session:  Begin scap mobs, continue light GH mobs and PROM to brandie, advance ROM to brandie, pain control prn      Time In / Time Out:    0830/0925    Timed Code/Total Treatment Minutes:   45/55          2 TE   1 Man  1 Vaso        Next Progress Note due:  30 days       Plan of Care Interventions:  [x] Therapeutic Exercise  [x] Modalities:  [x] Therapeutic Activity     [] Ultrasound  [] Estim  [] Gait Training      [] Cervical Traction [] Lumbar Traction  [x] Neuromuscular Re-education    [x] Cold/hotpack [] Iontophoresis   [x] Instruction in HEP      [x] Vasopneumatic   [] Dry Needling    [x] Manual Therapy               [] Aquatic Therapy              Electronically signed by:  Moriah Chu, PT,  PT, MPT, ATC   10/26/2021, 6:44 AM    10/26/2021, 9:55 AM

## 2021-10-29 ENCOUNTER — HOSPITAL ENCOUNTER (OUTPATIENT)
Dept: PHYSICAL THERAPY | Age: 61
Setting detail: THERAPIES SERIES
Discharge: HOME | End: 2021-10-29
Payer: COMMERCIAL

## 2021-10-29 PROCEDURE — 97016 VASOPNEUMATIC DEVICE THERAPY: CPT | Performed by: PHYSICAL THERAPY ASSISTANT

## 2021-10-29 PROCEDURE — 97140 MANUAL THERAPY 1/> REGIONS: CPT | Performed by: PHYSICAL THERAPY ASSISTANT

## 2021-10-29 PROCEDURE — 97110 THERAPEUTIC EXERCISES: CPT | Performed by: PHYSICAL THERAPY ASSISTANT

## 2021-10-29 NOTE — FLOWSHEET NOTE
Outpatient Physical Therapy  [place]           [x] Phone: [phone]   Fax: [phone]  Corina De La Cruz           [] Phone: [phone]   Fax: [phone]        Physical Therapy Daily Treatment Note  Date:  10/29/2021    Patient Name:  Pura Goodell    :  1960  MRN: [id number]  Restrictions/Precautions: Other position/activity restrictions: none  Diagnosis:   Diagnosis: R bicep strain  Date of Injury/Surgery: Aug   Treatment Diagnosis: Treatment Diagnosis: R shoulder pain, stiffness, weakness    Insurance/Certification information: PT Insurance Information: [place], 76 PCY, 12 used, $25 copay   Referring Physician:  Referring Practitioner: Nancy Arias MD  Next Doctor Visit:    Plan of care signed (Y/N):  yes  Outcome Measure: DASH 23%  Visit# / total visits:    POC  Pain level: 1/10   Goals:     Patient goals : get rid of pain, learn to manage it and what can/can't do at home  Short term goals  Time Frame for Short term goals: 3 weeks  Short term goal 1: Pt will be IND with HEP in order to maximize recovery outside of clinic  Compliance good so far 10/26  Short term goal 2: Pt will be able to move R arm through full ROM w/ min pain  Progressing  Long term goals  Time Frame for Long term goals : 6 weeks  Long term goal 1: Pt will be able to manage her pain and continue to progress on her own  Long term goal 2: Pt will have full and painfree AROM and strength R UE for return to PLOF  Long term goal 3: Pt will have improved DASH score by 10% or more    Summary of Evaluation: Assessment: Pt is a 60 yo female who presents w/ Dx of R bicep strain. She has HX of neck and L shoulder pain but not R. She had some improvement w/ steroids orally but pain returning. She has painful and mildly limited ROM and strength in R UE w/ TTP and limited function, compensatory patterns and + impingement signs.   These limitations are affecting her ability to perform her usual ADL's and she will benefit from PT to help restore PLOF. Prior to Aug of this year she had no R shoulder pain. Subjective:  Pt stated her soreness from last weeks visit has benefited from the education for the posture contributed to her shoulder problems. Still sore today. Medications taken for other chronic pain but not for the shoulder. Any changes in Ambulatory Summary Sheet? None        Objective:   COVID screening questions were asked and patient attested that there had been no contact or symptoms    Pt w/ pec and bicep tightness noted. TTP at pec/bicep R shoulder. Postural deficits and overactive UT noted. Pain w/ end range flex and ER R shoulder and lacks full motion even PROM. Reviewed HEP and go easy, won't change her deficits over night. She did get some cavitations w/ C/T HVLAT w/ min shoulder pain from position. Scap retract is less painful prone. Exercises: (No more than 4 columns)   Exercise/Equipment 10/18/21  #1 10/22/21 #2 10/26/21  #3 10/29/21 #4            WARM UP          UBE F/B  -- 120 rpm 2' ea  120 rpm 2'ea direction. TABLE       Press up [employer] 10x Cane x12 Cane 15x  Cane x 15 reps   Supine flex Cane 10x Cane x12 Cane 15x  Cane x 15 reps   Supine ER @90 Cane 10x arm on towel [employer] 15x  Manual ER today   Thoracic Stretch (seated extn, and SL Open Book)  X 5 reps right SL only, x 5 reps with bolster extn.  Man today Man today    Prone Scapular retraction    X 10 reps 10x  x10   Open book   -- Did doorway pec today and bicep 5x10\" ea angle TRX pec stretch x 60\"                 STANDING       scap squeeze arms down  Elbows bent 10x  10x Standing at wall x 10  -- \"W\" with thoracic roll at wall x 10 reps retraction   pendulum 30\" - -    Table slide flex  CW/CCW 10x  -- - 15x  15xea  -  -          Scap retractions at wall  X 5 reps - X 10   Prone shoulder retraction progression    Retract with arms by sides, in ER, in \"W\", rows, Horizontal abd., all x 10 reps each.  NDT facilitation of the lower traps by clinician. PROPRIOCEPTION                                          MODALITIES       Vaso  8' 10' 10' 10'              Other Therapeutic Activities/Education:  10/19/2021: Patient received education on their current pathology and how their condition effects them with their functional activities. Patient understood discussion and questions were answered. Patient understands their activity limitations and understands rational for treatment progression. 10/22/21:  Educated the pt of the post manual tx and stretch exercise today would expect to have increase in discomfort at ll the right shoulder muscles and along the neck and mid back. Take hot shoulders and drink lots of water with any medication appropriate per doctor for pain. Pt verbalized understanding of the education this date. 10/29/21  Educated the pt of the post manual tx and stretch exercise today would expect to have increase in discomfort at ll the right shoulder muscles and along the neck and mid back. Take hot shoulders and drink lots of water. Home Exercise Program:  Issued, practiced and pt demo ability to perform 10/19/2021  Access Code: [facility]  URL: [email or web address]  Date: 10/19/2021  Prepared by: Jennifer Tello    Exercises  Supine Shoulder Press with Dowel - 1-2 x daily - 7 x weekly - 2 sets - 10 reps  Supine Shoulder External Rotation with Dowel - 1-2 x daily - 7 x weekly - 2 sets - 10 reps  Supine Shoulder Flexion Extension AAROM with Dowel - 1-2 x daily - 7 x weekly - 2 sets - 10 reps  Seated Shoulder Flexion Towel Slide at Table Top - 1-2 x daily - 7 x weekly - 2 sets - 10 reps  Shoulder External Rotation and Scapular Retraction - 1-2 x daily - 7 x weekly - 2 sets - 10 reps  Seated Scapular Retraction - 1-2 x daily - 7 x weekly - 2 sets - 10 reps  Standing Scapular Retraction - 1-2 x daily - 7 x weekly - 2 sets - 10 reps  Circular Shoulder Pendulum with Table Support - 1-2 x daily - 7 x weekly - 1 sets - 2-3 reps - 30 seconds hold   **Added 10/22/21  Seated thoracic extension repeated x 10 reps  Side lying Open Book stretch 5 x 10 seconds      Manual Treatments:  Seated HVLAT for C/T and upper thor, Side Lying thoracic PA mobs, Light GH mobs, PROM to brandie R shoulder and added the Manual Scapular mobs, light pec/bicep STM, subscapularis release as well as pectoral minor in appropriate positions. Scapular framing and release in side lying. Modalities:  Vaso Moderate to R shoulder, pt seated w/ arm in pillow, 10' in sitting. Communication with other providers:  POC set for cosign 10/19/21      Assessment:   Pt tolerated Rx fairly well, but does have some pain/soreness after man/exercise that improves after ice. She demonstrates tightness anterior upper quarter w/ weakness posterior and UT compensations, this is affecting her shoulder function and creating limited ROM and strength. She will benefit from PT to help reduce pain and tightness, improve strength, mechanics and function to help restore PLOF. Pain after ice about same as when arrived. Pt required manual assisted end range of motion of the scapula during the flexion exercise to avoid the sharp pinch of anterior shoulder. The posterior mid thoracic spine very rigid and increased kyphotic curve. The somewhat fixed thoracic spine and rounded anterior scapula putting the [address] joint in poor biomechanical position.       Post tx: pain level rated  0-.5/10        Plan for Next Session:  ONGOING scap mobs, continue light GH mobs and PROM to brandie, advance ROM to brandie, pain control prn      Time In / Time Out:    0915/1015  Timed Code/Total Treatment Minutes:   50/60'         2 TE   1 Man  1 Vaso        Next Progress Note due:  30 days       Plan of Care Interventions:  [x] Therapeutic Exercise  [x] Modalities:  [x] Therapeutic Activity     [] Ultrasound  [] Estim  [] Gait Training      [] Cervical Traction [] Lumbar Traction  [x] Neuromuscular Re-education    [x] Cold/hotpack [] Iontophoresis   [x] Instruction in HEP      [x] Vasopneumatic   [] Dry Needling    [x] Manual Therapy               [] Aquatic Therapy              Electronically signed by:   Luisa Prieto PTA   10/29/2021, 9:00 AM    10/29/2021, 9:00 AM

## 2021-11-02 ENCOUNTER — HOSPITAL ENCOUNTER (OUTPATIENT)
Dept: PHYSICAL THERAPY | Age: 61
Setting detail: THERAPIES SERIES
Discharge: HOME | End: 2021-11-02
Payer: COMMERCIAL

## 2021-11-02 PROCEDURE — 97110 THERAPEUTIC EXERCISES: CPT

## 2021-11-02 PROCEDURE — 97016 VASOPNEUMATIC DEVICE THERAPY: CPT

## 2021-11-02 PROCEDURE — 97140 MANUAL THERAPY 1/> REGIONS: CPT

## 2021-11-02 NOTE — FLOWSHEET NOTE
Outpatient Physical Therapy  [place]           [x] Phone: [phone]   Fax: [phone]  [place]           [] Phone: [phone]   Fax: [phone]        Physical Therapy Daily Treatment Note  Date:  2021    Patient Name:  Deyanne Phoenix    :  1960  MRN: [id number]  Restrictions/Precautions: Other position/activity restrictions: none  Diagnosis:   Diagnosis: R bicep strain  Date of Injury/Surgery: Aug   Treatment Diagnosis: Treatment Diagnosis: R shoulder pain, stiffness, weakness    Insurance/Certification information: PT Insurance Information: [place], 76 PCY, 12 used, $25 copay   Referring Physician:  Referring Practitioner: Erik Kiran MD  Next Doctor Visit:    Plan of care signed (Y/N):  yes  Outcome Measure: DASH 23%  Visit# / total visits:    POC  Pain level: 10, but does get worse as days goes on   Goals:     Patient goals : get rid of pain, learn to manage it and what can/can't do at home  Short term goals  Time Frame for Short term goals: 3 weeks  Short term goal 1: Pt will be IND with HEP in order to maximize recovery outside of clinic  Compliance good so far 10/26  Short term goal 2: Pt will be able to move R arm through full ROM w/ min pain  Progressing  Long term goals  Time Frame for Long term goals : 6 weeks  Long term goal 1: Pt will be able to manage her pain and continue to progress on her own  Long term goal 2: Pt will have full and painfree AROM and strength R UE for return to PLOF  Long term goal 3: Pt will have improved DASH score by 10% or more    Summary of Evaluation: Assessment: Pt is a 60 yo female who presents w/ Dx of R bicep strain. She has HX of neck and L shoulder pain but not R. She had some improvement w/ steroids orally but pain returning. She has painful and mildly limited ROM and strength in R UE w/ TTP and limited function, compensatory patterns and + impingement signs.   These limitations are affecting her ability to perform her usual ADL's and she will benefit from PT to help restore PLOF. Prior to Aug of this year she had no R shoulder pain. Subjective:    Sore from Friday, lasted all w/e, Sat was really bad, had to stay in bed even, painful all around shoulder, delt, pec, scap. Max pain on Sat 3/10 but really sore and pain spikes to 4-5/10 w/ movements and she is busy at home. \"its affecting whole body, feet up. \"    Some of shoulder work felt in groin. She also notices feeling more loose on R side thru hip making her walk different b/c L still tight. Any changes in Ambulatory Summary Sheet? None        Objective:   COVID screening questions were asked and patient attested that there had been no contact or symptoms    Pt ambulates w/ increase transverse plane mobility R  Pec tightness noted B and decreased thoracic ext  TTP R bicep and pec   Improved scap retractions noted      Exercises: (No more than 4 columns)   Exercise/Equipment 10/26/21  #3 10/29/21 #4 11/2/21  #5             WARM UP          UBE F/B 120 rpm 2' ea  120 rpm 2'ea direction.   120 rpm 2'ea direction           TABLE       Press up [employer] x 15 reps No cane 1# ea UE 10x2    Supine flex Cane 15x  Cane x 15 reps No cane, alt UE 1# 10x ea, w/ core hold    Supine ER @90 Cane 15x  Manual ER today man    Thoracic Stretch (seated extn, and SL Open Book) Man today Man today Seated ext in chair 10x10\"     Prone Scapular retraction   10x  x10 Arms down 10x, \"w\" 10x     Open book  Did doorway pec today and bicep 5x10\" ea angle TRX pec stretch x 60\" 10x10\" ea way     Cobra to child's pose    5x10\" ea way           STANDING       scap squeeze arms down  Elbows bent -- \"W\" with thoracic roll at wall x 10 reps retraction Mcallister GTB 10x ea     pendulum -  -    Table slide flex  CW/CCW 15x  15xea  -  - -       -    Scap retractions at wall - X 10 -    Doorway pec stretch   30\" x2, 1x ea foot FW    Prone shoulder retraction progression  Retract with arms by sides, in ER, in \"W\", rows, Horizontal abd., all x 10 reps each. NDT facilitation of the lower traps by clinician. Above                 [address]  10' 10' 10'               Other Therapeutic Activities/Education:  10/19/2021: Patient received education on their current pathology and how their condition effects them with their functional activities. Patient understood discussion and questions were answered. Patient understands their activity limitations and understands rational for treatment progression. 10/22/21:  Educated the pt of the post manual tx and stretch exercise today would expect to have increase in discomfort at ll the right shoulder muscles and along the neck and mid back. Take hot shoulders and drink lots of water with any medication appropriate per doctor for pain. Pt verbalized understanding of the education this date. 10/29/21  Educated the pt of the post manual tx and stretch exercise today would expect to have increase in discomfort at ll the right shoulder muscles and along the neck and mid back. Take hot shoulders and drink lots of water. Home Exercise Program:  Issued, practiced and pt demo ability to perform 10/19/2021  Access Code: [facility]  URL: [email or web address]  Date: 10/19/2021  Prepared by: Nic Miguel    Exercises  Supine Shoulder Press with Dowel - 1-2 x daily - 7 x weekly - 2 sets - 10 reps  Supine Shoulder External Rotation with Dowel - 1-2 x daily - 7 x weekly - 2 sets - 10 reps  Supine Shoulder Flexion Extension AAROM with Dowel - 1-2 x daily - 7 x weekly - 2 sets - 10 reps  Seated Shoulder Flexion Towel Slide at Table Top - 1-2 x daily - 7 x weekly - 2 sets - 10 reps  Shoulder External Rotation and Scapular Retraction - 1-2 x daily - 7 x weekly - 2 sets - 10 reps  Seated Scapular Retraction - 1-2 x daily - 7 x weekly - 2 sets - 10 reps  Standing Scapular Retraction - 1-2 x daily - 7 x weekly - 2 sets - 10 reps  Circular Shoulder Pendulum with Table Support - 1-2 x daily - 7 x weekly - 1 sets - 2-3 reps - 30 seconds hold   **Added 10/22/21  Seated thoracic extension repeated x 10 reps  Side lying Open Book stretch 5 x 10 seconds      Manual Treatments:  Seated HVLAT for C/T and upper thor, Light GH mobs, PROM to brandie R shoulder and added the Manual Scapular mobs, light pec/bicep STM. Modalities:  Vaso Moderate to R shoulder, pt seated w/ arm in pillow, 10' in sitting. Communication with other providers:  POC set for cosign 10/19/21      Assessment:   Pt tolerated Rx fairly well, but does have some pain/soreness after man/exercise that improves after ice. She demonstrates tightness anterior upper quarter w/ weakness posterior and UT compensations, this is affecting her shoulder function and creating limited ROM and strength but is making some improvements w/ new exercises despite soreness. She will benefit from PT to help reduce pain and tightness, improve strength, mechanics and function to help restore PLOF.      Post tx: pain level rated  0-1/10        Plan for Next Session:  ONGOING scap mobs, continue light GH mobs and PROM to brandie, advance ROM to brandie, pain control prn  Update HEP    Time In / Time Out:  0845/0945     Timed Code/Total Treatment Minutes:    50/60       2 TE   1 Man  1 Vaso        Next Progress Note due:  30 days       Plan of Care Interventions:  [x] Therapeutic Exercise  [x] Modalities:  [x] Therapeutic Activity     [] Ultrasound  [] Estim  [] Gait Training      [] Cervical Traction [] Lumbar Traction  [x] Neuromuscular Re-education    [x] Cold/hotpack [] Iontophoresis   [x] Instruction in HEP      [x] Vasopneumatic   [] Dry Needling    [x] Manual Therapy               [] Aquatic Therapy              Electronically signed by:  Libia Hernandez, PT,  PT, MPT, ATC    11/2/2021, 6:45 AM       11/2/2021, 9:46 AM

## 2021-11-05 ENCOUNTER — HOSPITAL ENCOUNTER (OUTPATIENT)
Dept: PHYSICAL THERAPY | Age: 61
Setting detail: THERAPIES SERIES
Discharge: HOME | End: 2021-11-05
Payer: COMMERCIAL

## 2021-11-05 PROCEDURE — 97140 MANUAL THERAPY 1/> REGIONS: CPT

## 2021-11-05 PROCEDURE — 97016 VASOPNEUMATIC DEVICE THERAPY: CPT

## 2021-11-05 PROCEDURE — 97110 THERAPEUTIC EXERCISES: CPT

## 2021-11-05 NOTE — FLOWSHEET NOTE
Outpatient Physical Therapy  [place]           [x] Phone: [phone]   Fax: [phone]  [place] [facility]           [] Phone: [phone]   Fax: [phone]        Physical Therapy Daily Treatment Note  Date:  2021    Patient Name:  Nathaly Stauffer    :  1960  MRN: [id number]  Restrictions/Precautions: Other position/activity restrictions: none  Diagnosis:   Diagnosis: R bicep strain  Date of Injury/Surgery: Aug   Treatment Diagnosis: Treatment Diagnosis: R shoulder pain, stiffness, weakness    Insurance/Certification information: PT Insurance Information: [place], 76 PCY, 12 used, $25 copay   Referring Physician:  Referring Practitioner: Jessica Dodson MD  Next Doctor Visit:    Plan of care signed (Y/N):  yes  Outcome Measure: DASH 23%  Visit# / total visits:    POC  Pain level: 0/10, but does get worse as days goes on   Goals:     Patient goals : get rid of pain, learn to manage it and what can/can't do at home  Short term goals  Time Frame for Short term goals: 3 weeks  Short term goal 1: Pt will be IND with HEP in order to maximize recovery outside of clinic  Compliance good so far 10/26  Short term goal 2: Pt will be able to move R arm through full ROM w/ min pain  Progressing  Long term goals  Time Frame for Long term goals : 6 weeks  Long term goal 1: Pt will be able to manage her pain and continue to progress on her own  Long term goal 2: Pt will have full and painfree AROM and strength R UE for return to PLOF  Long term goal 3: Pt will have improved DASH score by 10% or more    Summary of Evaluation: Assessment: Pt is a 58 yo female who presents w/ Dx of R bicep strain. She has HX of neck and L shoulder pain but not R. She had some improvement w/ steroids orally but pain returning. She has painful and mildly limited ROM and strength in R UE w/ TTP and limited function, compensatory patterns and + impingement signs.   These limitations are affecting her ability to perform her usual ADL's and she will benefit from PT to help restore PLOF. Prior to Aug of this year she had no R shoulder pain. Subjective:   Working on house is making it sore and stiff. Lifting some was ok, but OH still painful and limited. Any changes in Ambulatory Summary Sheet? None        Objective:   COVID screening questions were asked and patient attested that there had been no contact or symptoms        End range plane w/ flex still and ER but nearly full PROM now. Pt had to stay on elbows for some of cobra d/t bicep pain today. Sendy Rumney ext needed to keep elbows in more today too. More sore w/ mild pain increase after exercises today w/ some pain in L upper quadrant of abdom too, pulling on muscles. Exercises: (No more than 4 columns)   Exercise/Equipment 10/26/21  #3 10/29/21 #4 11/2/21  #5 11/5/21  #6            WARM UP          UBE F/B 120 rpm 2' ea  120 rpm 2'ea direction.   120 rpm 2'ea direction 120 rpm 2'ea direction          TABLE       Press up [employer] x 15 reps No cane 1# ea UE 10x2 No cane 1# ea UE 10x2   Supine flex Cane 15x  Cane x 15 reps No cane, alt UE 1# 10x ea, w/ core hold No cane, alt UE 1# 10x ea, w/ core hold, 10x w/ oppos LE too   Supine ER @90 Cane 15x  Manual ER today man man   Thoracic Stretch (seated extn, and SL Open Book) Man today Man today Seated ext in chair 10x10\" Seated ext in chair 10x10\"    Prone Scapular retraction   10x  x10 Arms down 10x, \"w\" 10x  Arms down 10x, \"w\" 10x    Open book  Did doorway pec today and bicep 5x10\" ea angle TRX pec stretch x 60\" 10x10\" ea way  10x10\" ea way    Cobra to child's pose    5x10\" ea way 5x10\" ea way          STANDING       scap squeeze arms down  Elbows bent -- \"W\" with thoracic roll at wall x 10 reps retraction [employer] GTB 10x ea  Mcallister GTB 10x ea    Bicep stretch counter  -  - 30\" ea UE    Table slide flex  CW/CCW 15x  15xea  -  - - Doorway 30\" ea UE      -    Scap retractions at wall - X 10 -    Doorway pec stretch   30\" x2, 1x ea foot FW 30\" x2, 1x ea foot FW   Prone shoulder retraction progression  Retract with arms by sides, in ER, in \"W\", rows, Horizontal abd., all x 10 reps each. NDT facilitation of the lower traps by clinician. Above  --   taffy pull    GTB 10x    Counter push up    10x               PROPRIOCEPTION                                          MODALITIES       Vaso  10' 10' 10' 10'              Other Therapeutic Activities/Education:  10/19/2021: Patient received education on their current pathology and how their condition effects them with their functional activities. Patient understood discussion and questions were answered. Patient understands their activity limitations and understands rational for treatment progression. 10/22/21:  Educated the pt of the post manual tx and stretch exercise today would expect to have increase in discomfort at ll the right shoulder muscles and along the neck and mid back. Take hot shoulders and drink lots of water with any medication appropriate per doctor for pain. Pt verbalized understanding of the education this date. 10/29/21  Educated the pt of the post manual tx and stretch exercise today would expect to have increase in discomfort at ll the right shoulder muscles and along the neck and mid back. Take hot shoulders and drink lots of water. Home Exercise Program:  Issued, practiced and pt demo ability to perform 10/19/2021  Access Code: [facility]  URL: [email or web address]  Date: 10/19/2021  Prepared by: Chloe Duane    Exercises  Supine Shoulder Press with Dowel - 1-2 x daily - 7 x weekly - 2 sets - 10 reps  Supine Shoulder External Rotation with Dowel - 1-2 x daily - 7 x weekly - 2 sets - 10 reps  Supine Shoulder Flexion Extension AAROM with Dowel - 1-2 x daily - 7 x weekly - 2 sets - 10 reps  Seated Shoulder Flexion Towel Slide at Table Top - 1-2 x daily - 7 x weekly - 2 sets - 10 reps  Shoulder External Rotation and Scapular Retraction - 1-2 x daily - 7 x weekly - 2 sets - 10 reps  Seated Scapular Retraction - 1-2 x daily - 7 x weekly - 2 sets - 10 reps  Standing Scapular Retraction - 1-2 x daily - 7 x weekly - 2 sets - 10 reps  Circular Shoulder Pendulum with Table Support - 1-2 x daily - 7 x weekly - 1 sets - 2-3 reps - 30 seconds hold   **Added 10/22/21  Seated thoracic extension repeated x 10 reps  Side lying Open Book stretch 5 x 10 seconds    Access Code: [email or web address]  URL: [email or web address]  Date: 11/05/2021  Prepared by: Shabbir Lorenz    Exercises  Prone Scapular Retraction - 1 x daily - 3-4 x weekly - 1-2 sets - 10 reps  Prone W Scapular Retraction - 1 x daily - 3-4 x weekly - 1-2 sets - 10 reps  Scapular Retraction with Resistance - 1 x daily - 3-4 x weekly - 1-2 sets - 10 reps  Shoulder External Rotation and Scapular Retraction with Resistance - 1 x daily - 3-4 x weekly - 1-2 sets - 10 reps  Push Up on Table - 1 x daily - 3-4 x weekly - 1-2 sets - 10 reps  Doorway Pec Stretch at 60 Elevation - 1-2 x daily - 5-7 x weekly - 1 sets - 2 reps - 30 seconds hold  Standing Single Arm Shoulder Flexion Stretch on Wall - 1-2 x daily - 5-7 x weekly - 1 sets - 2 reps - 30 seconds hold  Standing Bicep Stretch at Wall - 1-2 x daily - 5-7 x weekly - 1 sets - 2 reps - 30 seconds hold      Manual Treatments: , Light GH mobs, PROM to brandie R shoulder and added the Manual Scapular mobs, light pec/bicep STM. Modalities:  Vaso Moderate to R shoulder, pt seated w/ arm in pillow, 10' in sitting. Communication with other providers:  POC set for cosign 10/19/21      Assessment:   Pt tolerated Rx fairly well, but does have some pain/soreness after man/exercise that improves after ice. She demonstrates tightness anterior upper quarter w/ weakness posterior and UT compensations, this is affecting her shoulder function and creating limited ROM and strength but is making some improvements w/ new exercises despite soreness.   She will benefit from PT to help reduce pain and tightness, improve strength, mechanics and function to help restore PLOF.      Post tx: pain level rated  0-1/10        Plan for Next Session:  ONGOING scap mobs, continue light [address] mobs and PROM to brandie, advance ROM to brandie, pain control prn       Time In / Time Out:     0830/0930  Timed Code/Total Treatment Minutes:   50/60         2 TE   1 Man  1 Vaso        Next Progress Note due:  30 days    ST 11/23      Plan of Care Interventions:  [x] Therapeutic Exercise  [x] Modalities:  [x] Therapeutic Activity     [] Ultrasound  [] Estim  [] Gait Training      [] Cervical Traction [] Lumbar Traction  [x] Neuromuscular Re-education    [x] Cold/hotpack [] Iontophoresis   [x] Instruction in HEP      [x] Vasopneumatic   [] Dry Needling    [x] Manual Therapy               [] Aquatic Therapy              Electronically signed by:  Mami Corona, PT,  PT, MPT, ATC    11/5/2021, 6:45 AM    11/5/2021, 9:28 AM

## 2021-11-09 ENCOUNTER — HOSPITAL ENCOUNTER (OUTPATIENT)
Dept: PHYSICAL THERAPY | Age: 61
Setting detail: THERAPIES SERIES
Discharge: HOME | End: 2021-11-09
Payer: COMMERCIAL

## 2021-11-09 PROCEDURE — 97016 VASOPNEUMATIC DEVICE THERAPY: CPT

## 2021-11-09 PROCEDURE — 97110 THERAPEUTIC EXERCISES: CPT

## 2021-11-09 PROCEDURE — 97140 MANUAL THERAPY 1/> REGIONS: CPT

## 2021-11-09 NOTE — FLOWSHEET NOTE
Outpatient Physical Therapy  [place]           [x] Phone: [phone]   Fax: [phone]  Jaylene Zepeda           [] Phone: [phone]   Fax: [phone]        Physical Therapy Daily Treatment Note  Date:  2021    Patient Name:  Cathy Woodward    :  1960  MRN: [id number]  Restrictions/Precautions: Other position/activity restrictions: none  Diagnosis:   Diagnosis: R bicep strain  Date of Injury/Surgery: Aug   Treatment Diagnosis: Treatment Diagnosis: R shoulder pain, stiffness, weakness    Insurance/Certification information: PT Insurance Information: [place], 76 PCY, 12 used, $25 copay   Referring Physician:  Referring Practitioner: Margo Lewis MD  Next Doctor Visit:    Plan of care signed (Y/N):  yes  Outcome Measure: DASH 23%  Visit# / total visits:    POC  Pain level: 0/10, but does get worse as days goes on   Goals:     Patient goals : get rid of pain, learn to manage it and what can/can't do at home  Short term goals  Time Frame for Short term goals: 3 weeks  Short term goal 1: Pt will be IND with HEP in order to maximize recovery outside of clinic  Compliance good so far 10/26  Short term goal 2: Pt will be able to move R arm through full ROM w/ min pain  Progressing  Long term goals  Time Frame for Long term goals : 6 weeks  Long term goal 1: Pt will be able to manage her pain and continue to progress on her own  Long term goal 2: Pt will have full and painfree AROM and strength R UE for return to PLOF  Long term goal 3: Pt will have improved DASH score by 10% or more    Summary of Evaluation: Assessment: Pt is a 58 yo female who presents w/ Dx of R bicep strain. She has HX of neck and L shoulder pain but not R. She had some improvement w/ steroids orally but pain returning. She has painful and mildly limited ROM and strength in R UE w/ TTP and limited function, compensatory patterns and + impingement signs.   These limitations are affecting her ability to perform her usual ADL's and she will benefit from PT to help restore PLOF. Prior to Aug of this year she had no R shoulder pain. Subjective:    Still just really stiff, branden anterior shoulder, but all over really, hard time doing child's pose d/t back and knees too, not just shoulder. Any changes in Ambulatory Summary Sheet? None        Objective:   COVID screening questions were asked and patient attested that there had been no contact or symptoms       Sore, tight and tender bicep and pec R shoulder. Very fatigued following manual work in R shoulder   PROM w/ some end range pain.          Exercises: (No more than 4 columns)   Exercise/Equipment 11/2/21  #5 11/5/21  #6 11/9/21  #7             WARM UP          UBE F/B 120 rpm 2'ea direction 120 rpm 2'ea direction 120 rpm 2'ea direction           TABLE       Press up No cane 1# ea UE 10x2 No cane 1# ea UE 10x2 No cane 1# ea UE 10x2    Supine flex No cane, alt UE 1# 10x ea, w/ core hold No cane, alt UE 1# 10x ea, w/ core hold, 10x w/ oppos LE too No cane, alt UE 10x ea, w/ core hold, 10x w/ oppos LE too    Supine ER @90 man man man    Thoracic Stretch (seated extn, and SL Open Book) Seated ext in chair 10x10\" Seated ext in chair 10x10\" -     Prone Scapular retraction   Arms down 10x, \"w\" 10x  Arms down 10x, \"w\" 10x  Arms down 10x, \"w\" 10x    Open book  10x10\" ea way  10x10\" ea way  man    Cobra to child's pose  5x10\" ea way 5x10\" ea way --           STANDING       scap squeeze arms down  Elbows bent Mcallister GTB 10x ea  Mcallister GTB 10x ea  -    Bicep stretch counter  - 30\" ea UE  -    Table slide flex  CW/CCW - Doorway 30\" ea UE -     -      Scap retractions at wall -  -    Doorway pec stretch 30\" x2, 1x ea foot FW 30\" x2, 1x ea foot FW -    Prone shoulder retraction progression Above  -- -    taffy pull  GTB 10x  -    Counter push up  10x -                PROPRIOCEPTION                                          MODALITIES       Vaso  10' 10' 10'               Other Therapeutic Activities/Education:  10/19/2021: Patient received education on their current pathology and how their condition effects them with their functional activities. Patient understood discussion and questions were answered. Patient understands their activity limitations and understands rational for treatment progression. 10/22/21:  Educated the pt of the post manual tx and stretch exercise today would expect to have increase in discomfort at ll the right shoulder muscles and along the neck and mid back. Take hot shoulders and drink lots of water with any medication appropriate per doctor for pain. Pt verbalized understanding of the education this date. 10/29/21  Educated the pt of the post manual tx and stretch exercise today would expect to have increase in discomfort at ll the right shoulder muscles and along the neck and mid back. Take hot shoulders and drink lots of water. Home Exercise Program:  Issued, practiced and pt demo ability to perform 10/19/2021  Access Code: [facility]  URL: [email or web address]  Date: 10/19/2021  Prepared by: Lynn Castro    Exercises  Supine Shoulder Press with Dowel - 1-2 x daily - 7 x weekly - 2 sets - 10 reps  Supine Shoulder External Rotation with Dowel - 1-2 x daily - 7 x weekly - 2 sets - 10 reps  Supine Shoulder Flexion Extension AAROM with Dowel - 1-2 x daily - 7 x weekly - 2 sets - 10 reps  Seated Shoulder Flexion Towel Slide at Table Top - 1-2 x daily - 7 x weekly - 2 sets - 10 reps  Shoulder External Rotation and Scapular Retraction - 1-2 x daily - 7 x weekly - 2 sets - 10 reps  Seated Scapular Retraction - 1-2 x daily - 7 x weekly - 2 sets - 10 reps  Standing Scapular Retraction - 1-2 x daily - 7 x weekly - 2 sets - 10 reps  Circular Shoulder Pendulum with Table Support - 1-2 x daily - 7 x weekly - 1 sets - 2-3 reps - 30 seconds hold   **Added 10/22/21  Seated thoracic extension repeated x 10 reps  Side lying Open Book stretch 5 x 10 seconds    Access Code: [email or web address]  URL: [email or web address]  Date: 11/05/2021  Prepared by: Fernanda Sang    Exercises  Prone Scapular Retraction - 1 x daily - 3-4 x weekly - 1-2 sets - 10 reps  Prone W Scapular Retraction - 1 x daily - 3-4 x weekly - 1-2 sets - 10 reps  Scapular Retraction with Resistance - 1 x daily - 3-4 x weekly - 1-2 sets - 10 reps  Shoulder External Rotation and Scapular Retraction with Resistance - 1 x daily - 3-4 x weekly - 1-2 sets - 10 reps  Push Up on Table - 1 x daily - 3-4 x weekly - 1-2 sets - 10 reps  Doorway Pec Stretch at 60 Elevation - 1-2 x daily - 5-7 x weekly - 1 sets - 2 reps - 30 seconds hold  Standing Single Arm Shoulder Flexion Stretch on Wall - 1-2 x daily - 5-7 x weekly - 1 sets - 2 reps - 30 seconds hold  Standing Bicep Stretch at Wall - 1-2 x daily - 5-7 x weekly - 1 sets - 2 reps - 30 seconds hold      Manual Treatments:  Light GH mobs, PROM to brandie R shoulder and added the Manual Scapular mobs, light pec/bicep STM and stretching    The patient received  explanation for the benefits and rational in utilizing the Graston Technique for their soft tissues limitations. Patient does not have any Red flags or absolute contraindications. Patient was instructed the use of the Graston Instruments on the skin may cause some discomfort/pain vicki of the skin, bruising or Petechiae. Patient understands these risks and has agreed to continue with the intervention. GT was applied to the  *anterior R shoulder, bicep, pec because the assessment demonstrated *tightness, tenderness, tissue restriction . The appropriate GT tool/tools and which stroke technique utilized were determined based on the assessment and treatment goals. The patients skin at the end of the treatment showed *mod redness. Overall GT treatment time *15'. Modalities:  Vaso Moderate to R shoulder, pt seated w/ arm in pillow, 10' in sitting.       Communication with other providers:  POC set for cosign 10/19/21      Assessment:   Pt tolerated Rx fairly well, but does have some pain/soreness after man/exercise that improves after ice. She demonstrates tightness anterior upper quarter w/ weakness posterior and UT compensations, this is affecting her shoulder function and creating limited ROM and strength but is making some improvements w/ new exercises despite soreness. She will benefit from PT to help reduce pain and tightness, improve strength, mechanics and function to help restore PLOF.      Post tx: pain level rated  0-1/10        Plan for Next Session:   If Graston was helpful, consider more IASTM prn or work on exercise next time and then return to manual prn, scap mobs, continue light GH mobs and PROM to brandie, advance ROM to brandie, pain control prn       Time In / Time Out:    0830/0930   Timed Code/Total Treatment Minutes:   50/60          1 TE   2 Man  1 Vaso        Next Progress Note due:       ST 11/23      Plan of Care Interventions:  [x] Therapeutic Exercise  [x] Modalities:  [x] Therapeutic Activity     [] Ultrasound  [] Estim  [] Gait Training      [] Cervical Traction [] Lumbar Traction  [x] Neuromuscular Re-education    [x] Cold/hotpack [] Iontophoresis   [x] Instruction in HEP      [x] Vasopneumatic   [] Dry Needling    [x] Manual Therapy               [] Aquatic Therapy              Electronically signed by:  Tyler Harris, PT,  PT, MPT, ATC    11/9/2021, 6:48 AM      11/9/2021, 12:49 PM

## 2021-11-23 ENCOUNTER — HOSPITAL ENCOUNTER (OUTPATIENT)
Dept: PHYSICAL THERAPY | Age: 61
Setting detail: THERAPIES SERIES
Discharge: HOME | End: 2021-11-23
Payer: COMMERCIAL

## 2021-11-23 PROCEDURE — 97110 THERAPEUTIC EXERCISES: CPT

## 2021-11-23 PROCEDURE — 97140 MANUAL THERAPY 1/> REGIONS: CPT

## 2021-11-23 NOTE — FLOWSHEET NOTE
Outpatient Physical Therapy  [place]           [x] Phone: [phone]   Fax: [phone]  [facility]uf[facility] Nav[facility]           [] Phone: [phone]   Fax: [phone]        Physical Therapy Daily Treatment Note  Date:  2021    Patient Name:  Lena Pitt    :  1960  MRN: [id number]  Restrictions/Precautions: Other position/activity restrictions: none  Diagnosis:   Diagnosis: R bicep strain  Date of Injury/Surgery: Aug   Treatment Diagnosis: Treatment Diagnosis: R shoulder pain, stiffness, weakness    Insurance/Certification information: PT Insurance Information: [place], 76 PCY, 12 used, $25 copay   Referring Physician:  Referring Practitioner: Devon Allen MD  Next Doctor Visit:    Plan of care signed (Y/N):  yes  Outcome Measure: DASH 23%  Visit# / total visits:    POC  Pain level: 0/10, but does get worse as days goes on   Goals:     Patient goals : get rid of pain, learn to manage it and what can/can't do at home  Short term goals  Time Frame for Short term goals: 3 weeks  Short term goal 1: Pt will be IND with HEP in order to maximize recovery outside of clinic  Compliance good, partially met  Short term goal 2: Pt will be able to move R arm through full ROM w/ min pain  Progressing  Long term goals  Time Frame for Long term goals : 6 weeks  Long term goal 1: Pt will be able to manage her pain and continue to progress on her own  Partially met  Long term goal 2: Pt will have full and painfree AROM and strength R UE for return to PLOF  Progressing   Long term goal 3: Pt will have improved DASH score by 10% or more  Met  DASH 23% at [facility], 21: 5%    Summary of Evaluation: Assessment: Pt is a 60 yo female who presents w/ Dx of R bicep strain. She has HX of neck and L shoulder pain but not R. She had some improvement w/ steroids orally but pain returning.   She has painful and mildly limited ROM and strength in R UE w/ TTP and limited function, compensatory patterns and + impingement signs. These limitations are affecting her ability to perform her usual ADL's and she will benefit from PT to help restore PLOF. Prior to Aug of this year she had no R shoulder pain. Subjective:      Has been very sore overall, even into LB now. Josselin did help last Rx. Has a hard time when does OH activity, bringing arm back down. Plans to see chiropractic and massage and would like to hold PT until does some of this and see how she does on her own too. Pain mostly bicep area still in shoulder, but upper and lower back some as well. Any changes in Ambulatory Summary Sheet? None        Objective:   COVID screening questions were asked and patient attested that there had been no contact or symptoms    Shoulder AROM seated:  Flex 150 vs 160 on L w/ bicep pain R  abd is nearly equal B but still lacks some end range mobility R w/ some pain  ER scratch is more equal ROM but has to work harder to get there and has some pain  IR scratch still 2 levels less on R w/ some pain    Pt has + impingement signs still, especially cross over. TTP coracoid and short head of bicep.         Exercises: (No more than 4 columns)   Exercise/Equipment 11/2/21  #5 11/5/21  #6 11/9/21  #7 11/23/21  #8            WARM UP          UBE F/B 120 rpm 2'ea direction 120 rpm 2'ea direction 120 rpm 2'ea direction 120 rpm 2'ea direction          TABLE       Press up No cane 1# ea UE 10x2 No cane 1# ea UE 10x2 No cane 1# ea UE 10x2 -   Supine flex No cane, alt UE 1# 10x ea, w/ core hold No cane, alt UE 1# 10x ea, w/ core hold, 10x w/ oppos LE too No cane, alt UE 10x ea, w/ core hold, 10x w/ oppos LE too -   Supine ER @90 man man man -   Thoracic Stretch (seated extn, and SL Open Book) Seated ext in chair 10x10\" Seated ext in chair 10x10\" - TRX walk out w/ some thor ext 5x5\"    Prone Scapular retraction   Arms down 10x, \"w\" 10x  Arms down 10x, \"w\" 10x  Arms down 10x, \"w\" 10x -   Open book  10x10\" ea way  10x10\" ea way  man Reviewed for LB too   Cobra to child's pose  5x10\" ea way 5x10\" ea way -- -          STANDING       scap squeeze arms down  Elbows bent Mcallister GTB 10x ea  Mcallister GTB 10x ea  - -   Bicep stretch counter  - 30\" ea UE  - -   Table slide flex  CW/CCW - Doorway 30\" ea UE - -    -   -   Scap retractions at wall -  - -   Doorway pec stretch 30\" x2, 1x ea foot FW 30\" x2, 1x ea foot FW - -   Prone shoulder retraction progression Above  -- - -   taffy pull  GTB 10x  - -   Counter push up  10x - -               PROPRIOCEPTION                                          MODALITIES       Vaso  10' 10' 10' -              Other Therapeutic Activities/Education:  10/19/2021: Patient received education on their current pathology and how their condition effects them with their functional activities. Patient understood discussion and questions were answered. Patient understands their activity limitations and understands rational for treatment progression. 10/22/21:  Educated the pt of the post manual tx and stretch exercise today would expect to have increase in discomfort at ll the right shoulder muscles and along the neck and mid back. Take hot shoulders and drink lots of water with any medication appropriate per doctor for pain. Pt verbalized understanding of the education this date. 10/29/21  Educated the pt of the post manual tx and stretch exercise today would expect to have increase in discomfort at ll the right shoulder muscles and along the neck and mid back. Take hot shoulders and drink lots of water. Home Exercise Program:  Issued, practiced and pt demo ability to perform 10/19/2021  Access Code: [facility]  URL: [email or web address]  Date: 10/19/2021  Prepared by: Chloe Duane    Exercises  Supine Shoulder Press with Dowel - 1-2 x daily - 7 x weekly - 2 sets - 10 reps  Supine Shoulder External Rotation with Dowel - 1-2 x daily - 7 x weekly - 2 sets - 10 reps  Supine Shoulder Flexion Extension AAROM with Dowel - 1-2 x daily - 7 x weekly - 2 sets - 10 reps  Seated Shoulder Flexion Towel Slide at Table Top - 1-2 x daily - 7 x weekly - 2 sets - 10 reps  Shoulder External Rotation and Scapular Retraction - 1-2 x daily - 7 x weekly - 2 sets - 10 reps  Seated Scapular Retraction - 1-2 x daily - 7 x weekly - 2 sets - 10 reps  Standing Scapular Retraction - 1-2 x daily - 7 x weekly - 2 sets - 10 reps  Circular Shoulder Pendulum with Table Support - 1-2 x daily - 7 x weekly - 1 sets - 2-3 reps - 30 seconds hold   **Added 10/22/21  Seated thoracic extension repeated x 10 reps  Side lying Open Book stretch 5 x 10 seconds    Access Code: [email or web address]  URL: [email or web address]  Date: 11/05/2021  Prepared by: Nic Miguel    Exercises  Prone Scapular Retraction - 1 x daily - 3-4 x weekly - 1-2 sets - 10 reps  Prone W Scapular Retraction - 1 x daily - 3-4 x weekly - 1-2 sets - 10 reps  Scapular Retraction with Resistance - 1 x daily - 3-4 x weekly - 1-2 sets - 10 reps  Shoulder External Rotation and Scapular Retraction with Resistance - 1 x daily - 3-4 x weekly - 1-2 sets - 10 reps  Push Up on Table - 1 x daily - 3-4 x weekly - 1-2 sets - 10 reps  Doorway Pec Stretch at 60 Elevation - 1-2 x daily - 5-7 x weekly - 1 sets - 2 reps - 30 seconds hold  Standing Single Arm Shoulder Flexion Stretch on Wall - 1-2 x daily - 5-7 x weekly - 1 sets - 2 reps - 30 seconds hold  Standing Bicep Stretch at Wall - 1-2 x daily - 5-7 x weekly - 1 sets - 2 reps - 30 seconds hold      Manual Treatments:  Light GH mobs, PROM to brandie R shoulder and added the Manual Scapular mobs, light pec/bicep STM and stretching    The patient received  explanation for the benefits and rational in utilizing the Graston Technique for their soft tissues limitations. Patient does not have any Red flags or absolute contraindications.  Patient was instructed the use of the Graston Instruments on the skin may cause some discomfort/pain vicki of the skin, bruising or Petechiae. Patient understands these risks and has agreed to continue with the intervention. GT was applied to the  *anterior R shoulder, bicep, pec because the assessment demonstrated *tightness, tenderness, tissue restriction . The appropriate GT tool/tools and which stroke technique utilized were determined based on the assessment and treatment goals. The patients skin at the end of the treatment showed *mod redness. Overall GT treatment time *15'. Modalities:      Communication with other providers:  POC set for cosign 10/19/21      Assessment:   Pt tolerated Rx fairly well and has made nice progress overall. She demonstrates tightness anterior upper quarter w/ weakness posterior and UT compensations, this is affecting her shoulder function and creating limited ROM and strength but is making some improvements w/ new exercises despite soreness. She could benefit from PT to help reduce pain and tightness, improve strength, mechanics and function to help restore PLOF, but would like to pursue some other things and see how she does on her own as well and will contact us w/in next 30 days if would like to continue and/or needs LB assessment.       Post tx: pain level rated  0-1/10        Plan for Next Session:   Hold 30 days, so re-check     Time In / Time Out:   1000/1050    Timed Code/Total Treatment Minutes:   50/50         1 TE   2 Man       Next Progress Note due:       ST 11/23      Plan of Care Interventions:  [x] Therapeutic Exercise  [x] Modalities:  [x] Therapeutic Activity     [] Ultrasound  [] Estim  [] Gait Training      [] Cervical Traction [] Lumbar Traction  [x] Neuromuscular Re-education    [x] Cold/hotpack [] Iontophoresis   [x] Instruction in HEP      [x] Vasopneumatic   [] Dry Needling    [x] Manual Therapy               [] Aquatic Therapy              Electronically signed by:  Ryann Olmedo, PT,  PT, MPT, ATC    11/23/2021, 10:00 AM      11/23/2021, 10:00 AM

## 2021-11-23 NOTE — PROGRESS NOTES
Outpatient Physical Therapy           [place]           [x] Phone: [phone]   Fax: [phone]  [facility] [place]           [] Phone: [phone]   Fax: [phone]      Gabriela Best MD      From: Ritu Todd, PT, PT     Patient: Albert Regalado                  : 1960  Diagnosis:R bicep strain      Date: 2021  Treatment Diagnosis: R shoulder pain, stiffness, weakness      [x]  Progress Note                []  Discharge Note    Evaluation Date:  10/19/2021 Total Visits to date:   8 Cancels/No-shows to date:  0    Subjective:  Has been very sore overall, even into LB now. Josselin did help last Rx. Has a hard time when does OH activity, bringing arm back down. Plans to see chiropractic and massage and would like to hold PT until does some of this and see how she does on her own too. Pain mostly bicep area still in shoulder, but upper and lower back some as well. Plan of Care/Treatment to date:  [x] Therapeutic Exercise    [x] Modalities:  [x] Therapeutic Activity     [] Ultrasound  [] Electrical Stimulation  [] Gait Training      [] Cervical Traction   [] Lumbar Traction  [x] Neuromuscular Re-education  [x] Cold/hotpack [] Iontophoresis  [x] Instruction in HEP      Other:  [x] Manual Therapy       [x]  Vasopneumatic  [] Aquatic Therapy       []   Dry Needle Therapy                      Objective/Significant Findings At Last Visit/Comments:  COVID screening questions were asked and patient attested that there had been no contact or symptoms     Shoulder AROM seated:  Flex 150 vs 160 on L w/ bicep pain R  abd is nearly equal B but still lacks some end range mobility R w/ some pain  ER scratch is more equal ROM but has to work harder to get there and has some pain  IR scratch still 2 levels less on R w/ some pain     Pt has + impingement signs still, especially cross over. TTP coracoid and short head of bicep.       Assessment:   Pt tolerated Rx fairly well and has made nice progress overall. She demonstrates tightness anterior upper quarter w/ weakness posterior and UT compensations, this is affecting her shoulder function and creating limited ROM and strength but is making some improvements w/ new exercises despite soreness. She could benefit from PT to help reduce pain and tightness, improve strength, mechanics and function to help restore PLOF, but would like to pursue some other things and see how she does on her own as well and will contact us w/in next 30 days if would like to continue and/or needs LB assessment.       Post tx: pain level rated  0-1/10    Goal Status:  [] Achieved [x] Partially Achieved  [] Not Achieved   Patient goals : get rid of pain, learn to manage it and what can/can't do at home  Short term goals  Time Frame for Short term goals: 3 weeks  Short term goal 1: Pt will be IND with HEP in order to maximize recovery outside of clinic  Compliance good, partially met  Short term goal 2: Pt will be able to move R arm through full ROM w/ min pain  Progressing  Long term goals  Time Frame for Long term goals : 6 weeks  Long term goal 1: Pt will be able to manage her pain and continue to progress on her own  Partially met  Long term goal 2: Pt will have full and painfree AROM and strength R UE for return to PLOF  Progressing   Long term goal 3: Pt will have improved DASH score by 10% or more  Met  DASH 23% at [facility], 11/23/21: 5%     Frequency/Duration:  # Days per week: [] 1 day # Weeks: [] 1 week [] 4 weeks [] 8 weeks     [x] 2 days   [] 2 weeks [] 5 weeks [] 10 weeks     [] 3 days   [] 3 weeks [x] 6 weeks [] 12 weeks       Rehab Potential: [] Excellent [x] Good [] Fair  [] Poor         Patient Status: [x] Continue per initial plan of Care     [] Patient now discharged     [x] Additional visits requested, Please re-certify for additional visits:      Requested frequency/duration:  1-2/week for 3 weeks prn if pt returns     If we are requesting more visits, we fully anticipate the patient's condition is expected to improve within the treatment timeframe we are requesting. Electronically signed by:  Tiffanie Kulkarni PT,PT, MPT, ATC  11/23/2021, 1:18 PM    11/23/2021, 1:19 PM  If you have any questions or concerns, please don't hesitate to call.   Thank you for your referral.    Physician Signature:______________________ Date:______ Time: ________  By signing above, therapists plan is approved by physician

## 2021-12-28 NOTE — DISCHARGE SUMMARY
Outpatient Physical Therapy           [place]           [x] Phone: [phone]   Fax: [phone]  [facility]           [] Phone: [phone]   Fax: [phone]      To:Genesis aJrrell MD      From: Martin Hoang, PT, PT     Patient: Judy Jean                  : 1960  Diagnosis:R bicep strain      Date: 2021  Treatment Diagnosis: R shoulder pain, stiffness, weakness      []  Progress Note                [x]  Discharge Note    Evaluation Date:  10/19/2021 Total Visits to date:   8 Cancels/No-shows to date:  0    Pt never returned after note on 2021, will dc at this time. Subjective:  Has been very sore overall, even into LB now. Josselin did help last Rx. Has a hard time when does OH activity, bringing arm back down. Plans to see chiropractic and massage and would like to hold PT until does some of this and see how she does on her own too. Pain mostly bicep area still in shoulder, but upper and lower back some as well. Plan of Care/Treatment to date:  [x] Therapeutic Exercise    [x] Modalities:  [x] Therapeutic Activity     [] Ultrasound  [] Electrical Stimulation  [] Gait Training      [] Cervical Traction   [] Lumbar Traction  [x] Neuromuscular Re-education  [x] Cold/hotpack [] Iontophoresis  [x] Instruction in HEP      Other:  [x] Manual Therapy       [x]  Vasopneumatic  [] Aquatic Therapy       []   Dry Needle Therapy                      Objective/Significant Findings At Last Visit/Comments:  COVID screening questions were asked and patient attested that there had been no contact or symptoms     Shoulder AROM seated:  Flex 150 vs 160 on L w/ bicep pain R  abd is nearly equal B but still lacks some end range mobility R w/ some pain  ER scratch is more equal ROM but has to work harder to get there and has some pain  IR scratch still 2 levels less on R w/ some pain     Pt has + impingement signs still, especially cross over.     TTP coracoid and short head of bicep. Assessment:   Pt tolerated Rx fairly well and has made nice progress overall. She demonstrates tightness anterior upper quarter w/ weakness posterior and UT compensations, this is affecting her shoulder function and creating limited ROM and strength but is making some improvements w/ new exercises despite soreness. She could benefit from PT to help reduce pain and tightness, improve strength, mechanics and function to help restore PLOF, but would like to pursue some other things and see how she does on her own as well and will contact us w/in next 30 days if would like to continue and/or needs LB assessment.       Post tx: pain level rated  0-1/10    Goal Status:  [] Achieved [x] Partially Achieved  [] Not Achieved   Patient goals : get rid of pain, learn to manage it and what can/can't do at home  Short term goals  Time Frame for Short term goals: 3 weeks  Short term goal 1: Pt will be IND with HEP in order to maximize recovery outside of clinic  Compliance good, partially met  Short term goal 2: Pt will be able to move R arm through full ROM w/ min pain  Progressing  Long term goals  Time Frame for Long term goals : 6 weeks  Long term goal 1: Pt will be able to manage her pain and continue to progress on her own  Partially met  Long term goal 2: Pt will have full and painfree AROM and strength R UE for return to PLOF  Progressing   Long term goal 3: Pt will have improved DASH score by 10% or more  Met  DASH 23% at [facility], 11/23/21: 5%     Frequency/Duration:  # Days per week: [] 1 day # Weeks: [] 1 week [] 4 weeks [] 8 weeks     [x] 2 days   [] 2 weeks [] 5 weeks [] 10 weeks     [] 3 days   [] 3 weeks [x] 6 weeks [] 12 weeks       Rehab Potential: [] Excellent [x] Good [] Fair  [] Poor         Patient Status: [] Continue per initial plan of Care     [x] Patient now discharged     [] Additional visits requested, Please re-certify for additional visits:      Requested frequency/duration:       If we are requesting more visits, we fully anticipate the patient's condition is expected to improve within the treatment timeframe we are requesting. Electronically signed by:  Ritu Todd PT,PT, MPT, ATC  12/28/2021, 5:00 PM    12/28/2021, 5:00 PM  If you have any questions or concerns, please don't hesitate to call.   Thank you for your referral.

## 2022-01-29 ENCOUNTER — HOSPITAL ENCOUNTER (OUTPATIENT)
Age: 62
Discharge: HOME | End: 2022-01-29
Payer: COMMERCIAL

## 2022-01-29 DIAGNOSIS — R73.03 PREDIABETES: ICD-10-CM

## 2022-01-29 DIAGNOSIS — I10 ESSENTIAL HYPERTENSION: ICD-10-CM

## 2022-01-29 DIAGNOSIS — E78.2 MIXED HYPERLIPIDEMIA: ICD-10-CM

## 2022-01-29 LAB
ALBUMIN SERPL-MCNC: 4.7 GM/DL (ref 3.4–5)
ALP BLD-CCNC: 73 IU/L (ref 40–128)
ALT SERPL-CCNC: 22 U/L (ref 10–40)
ANION GAP SERPL CALCULATED.3IONS-SCNC: 11 MMOL/L (ref 4–16)
AST SERPL-CCNC: 19 IU/L (ref 15–37)
BASOPHILS ABSOLUTE: 0 K/CU MM
BASOPHILS RELATIVE PERCENT: 0.3 % (ref 0–1)
BILIRUB SERPL-MCNC: 0.4 MG/DL (ref 0–1)
BUN BLDV-MCNC: 14 MG/DL (ref 6–23)
CALCIUM SERPL-MCNC: 10 MG/DL (ref 8.3–10.6)
CHLORIDE BLD-SCNC: 104 MMOL/L (ref 99–110)
CHOLESTEROL: 190 MG/DL
CO2: 28 MMOL/L (ref 21–32)
CREAT SERPL-MCNC: 0.6 MG/DL (ref 0.6–1.1)
DIFFERENTIAL TYPE: ABNORMAL
EOSINOPHILS ABSOLUTE: 0.2 K/CU MM
EOSINOPHILS RELATIVE PERCENT: 2.1 % (ref 0–3)
ESTIMATED AVERAGE GLUCOSE: 117 MG/DL
GFR AFRICAN AMERICAN: >60 ML/MIN/1.73M2
GFR NON-AFRICAN AMERICAN: >60 ML/MIN/1.73M2
GLUCOSE BLD-MCNC: 103 MG/DL (ref 70–99)
HBA1C MFR BLD: 5.7 % (ref 4.2–6.3)
HCT VFR BLD CALC: 45.3 % (ref 37–47)
HDLC SERPL-MCNC: 37 MG/DL
HEMOGLOBIN: 14.1 GM/DL (ref 12.5–16)
IMMATURE NEUTROPHIL %: 0.6 % (ref 0–0.43)
LDL CHOLESTEROL DIRECT: 101 MG/DL
LYMPHOCYTES ABSOLUTE: 3.2 K/CU MM
LYMPHOCYTES RELATIVE PERCENT: 40.3 % (ref 24–44)
MCH RBC QN AUTO: 27.5 PG (ref 27–31)
MCHC RBC AUTO-ENTMCNC: 31.1 % (ref 32–36)
MCV RBC AUTO: 88.3 FL (ref 78–100)
MONOCYTES ABSOLUTE: 0.6 K/CU MM
MONOCYTES RELATIVE PERCENT: 8.1 % (ref 0–4)
NUCLEATED RBC %: 0 %
PDW BLD-RTO: 13.7 % (ref 11.7–14.9)
PLATELET # BLD: 282 K/CU MM (ref 140–440)
PMV BLD AUTO: 10.5 FL (ref 7.5–11.1)
POTASSIUM SERPL-SCNC: 4.5 MMOL/L (ref 3.5–5.1)
RBC # BLD: 5.13 M/CU MM (ref 4.2–5.4)
SEGMENTED NEUTROPHILS ABSOLUTE COUNT: 3.9 K/CU MM
SEGMENTED NEUTROPHILS RELATIVE PERCENT: 48.6 % (ref 36–66)
SODIUM BLD-SCNC: 143 MMOL/L (ref 135–145)
TOTAL IMMATURE NEUTOROPHIL: 0.05 K/CU MM
TOTAL NUCLEATED RBC: 0 K/CU MM
TOTAL PROTEIN: 7.1 GM/DL (ref 6.4–8.2)
TRIGL SERPL-MCNC: 369 MG/DL
WBC # BLD: 7.9 K/CU MM (ref 4–10.5)

## 2022-01-29 PROCEDURE — 83036 HEMOGLOBIN GLYCOSYLATED A1C: CPT

## 2022-01-29 PROCEDURE — 80053 COMPREHEN METABOLIC PANEL: CPT

## 2022-01-29 PROCEDURE — 36415 COLL VENOUS BLD VENIPUNCTURE: CPT

## 2022-01-29 PROCEDURE — 80061 LIPID PANEL: CPT

## 2022-01-29 PROCEDURE — 85025 COMPLETE CBC W/AUTO DIFF WBC: CPT

## 2022-02-02 LAB — MAMMOGRAPHY, EXTERNAL: NORMAL

## 2022-02-07 ENCOUNTER — OFFICE VISIT (OUTPATIENT)
Dept: INTERNAL MEDICINE CLINIC | Age: 62
End: 2022-02-07
Payer: COMMERCIAL

## 2022-02-07 VITALS
SYSTOLIC BLOOD PRESSURE: 137 MMHG | OXYGEN SATURATION: 97 % | BODY MASS INDEX: 31.28 KG/M2 | HEART RATE: 99 BPM | DIASTOLIC BLOOD PRESSURE: 88 MMHG | HEIGHT: 62 IN | WEIGHT: 170 LBS | TEMPERATURE: 98.3 F

## 2022-02-07 DIAGNOSIS — M79.671 CHRONIC PAIN OF BOTH FEET: Primary | ICD-10-CM

## 2022-02-07 DIAGNOSIS — I10 ESSENTIAL HYPERTENSION: ICD-10-CM

## 2022-02-07 DIAGNOSIS — M79.672 CHRONIC PAIN OF BOTH FEET: Primary | ICD-10-CM

## 2022-02-07 DIAGNOSIS — G89.29 CHRONIC PAIN OF BOTH FEET: Primary | ICD-10-CM

## 2022-02-07 DIAGNOSIS — G47.30 SLEEP APNEA, UNSPECIFIED TYPE: ICD-10-CM

## 2022-02-07 DIAGNOSIS — E78.1 HYPERTRIGLYCERIDEMIA: ICD-10-CM

## 2022-02-07 PROCEDURE — 99214 OFFICE O/P EST MOD 30 MIN: CPT | Performed by: FAMILY MEDICINE

## 2022-02-07 RX ORDER — FAMCICLOVIR 500 MG/1
TABLET, FILM COATED ORAL
Qty: 6 TABLET | Refills: 2 | Status: SHIPPED | OUTPATIENT
Start: 2022-02-07 | End: 2022-08-29 | Stop reason: SDUPTHER

## 2022-02-07 SDOH — ECONOMIC STABILITY: FOOD INSECURITY: WITHIN THE PAST 12 MONTHS, YOU WORRIED THAT YOUR FOOD WOULD RUN OUT BEFORE YOU GOT MONEY TO BUY MORE.: NEVER TRUE

## 2022-02-07 SDOH — ECONOMIC STABILITY: FOOD INSECURITY: WITHIN THE PAST 12 MONTHS, THE FOOD YOU BOUGHT JUST DIDN'T LAST AND YOU DIDN'T HAVE MONEY TO GET MORE.: NEVER TRUE

## 2022-02-07 ASSESSMENT — PATIENT HEALTH QUESTIONNAIRE - PHQ9
SUM OF ALL RESPONSES TO PHQ QUESTIONS 1-9: 2
1. LITTLE INTEREST OR PLEASURE IN DOING THINGS: 1
SUM OF ALL RESPONSES TO PHQ QUESTIONS 1-9: 2
SUM OF ALL RESPONSES TO PHQ QUESTIONS 1-9: 2
2. FEELING DOWN, DEPRESSED OR HOPELESS: 1
SUM OF ALL RESPONSES TO PHQ QUESTIONS 1-9: 2
SUM OF ALL RESPONSES TO PHQ9 QUESTIONS 1 & 2: 2

## 2022-02-07 ASSESSMENT — ENCOUNTER SYMPTOMS
DIARRHEA: 0
CHEST TIGHTNESS: 0
ABDOMINAL PAIN: 0
SORE THROAT: 0
CONSTIPATION: 0
VOMITING: 0
COLOR CHANGE: 0
SHORTNESS OF BREATH: 0

## 2022-02-07 ASSESSMENT — SOCIAL DETERMINANTS OF HEALTH (SDOH): HOW HARD IS IT FOR YOU TO PAY FOR THE VERY BASICS LIKE FOOD, HOUSING, MEDICAL CARE, AND HEATING?: NOT HARD AT ALL

## 2022-02-07 NOTE — PROGRESS NOTES
Subjective:      Chief Complaint   Patient presents with    6 Month Follow-Up       HPI:  Curt Anne is a 59 y.o. female who presents today for follow up of chronic conditions as listed below. Chronic foot pain:  Walked a 5K last month, which seems to have flared up her L foot. Is still working with massage therapist and kinesiologist.  Pain mostly localized to lateral side of foot, feels like it is being stretched when she turns her foot inwards. Is trying to cut down on norco use, but still usually ends up using it twice a day- takes tylenol or advil as needed if she is still having trouble getting to sleep due to pain. HTN:  BP's at home have joe 110-120's/80's. Denying any symptoms. Will be following up with cardiology in 3 months. HLP:  Has still been continuing trilipix as tolerated. Does upset her stomach, but only takes a few days in a row, which keeps it tolerable. States her  has also started eating healthier due to his heart attack, so that has made it easier to eat better. JETHRO:  Is compliant with CPAP, but needs new equipment. Otherwise feeling well today, no acute concerns. Labs reviewed.         Past Medical History:   Diagnosis Date    Fatty liver     by US    Fibromyalgia     GERD (gastroesophageal reflux disease)     by UGI    Gout     Hyperlipidemia     Hypertension     Hypoglycemia     IBS (irritable bowel syndrome)     Prolonged emergence from general anesthesia     PSVT (paroxysmal supraventricular tachycardia) (HCC)     s/p ablation        Past Surgical History:   Procedure Laterality Date    ABDOMEN SURGERY       Cholecystectomy    ABDOMEN SURGERY      Rt ovarian adhesions removed and cervical laser therapy/Dr Maria Elena Foster ABDOMEN SURGERY      Rt ovarian cyst removed    ABLATION OF DYSRHYTHMIC FOCUS      APPENDECTOMY      BONE CYST EXCISION  1980    Left bone cyst aneurysm removed    CHOLECYSTECTOMY      COLONOSCOPY  1/19/2016 diverticulosis    FOOT NEUROMA SURGERY Right     TONSILLECTOMY         Social History     Tobacco Use    Smoking status: Never Smoker    Smokeless tobacco: Never Used   Substance Use Topics    Alcohol use: Yes     Comment: occasionally        Review of Systems   Constitutional: Negative for activity change, appetite change, chills, fever and unexpected weight change. HENT: Negative for congestion and sore throat. Respiratory: Negative for chest tightness and shortness of breath. Cardiovascular: Negative for chest pain and palpitations. Gastrointestinal: Negative for abdominal pain, constipation, diarrhea and vomiting. Genitourinary: Negative for dysuria. Skin: Negative for color change. Neurological: Negative for dizziness and light-headedness. Psychiatric/Behavioral: Negative for dysphoric mood. The patient is not nervous/anxious. Prior to Visit Medications    Medication Sig Taking? Authorizing Provider   HYDROcodone-acetaminophen (NORCO) 5-325 MG per tablet Take 1 tablet by mouth 2 times daily as needed for Pain for up to 30 days.  Take lowest dose possible to manage pain Yes Richy Correa MD   dilTIAZem (DILTIAZEM CD) 300 MG extended release capsule Take 300 mg by mouth daily Yes Historical Provider, MD   choline fenofibrate (TRILIPIX) 135 MG CPDR delayed release capsule TAKE ONE CAPSULE BY MOUTH EVERY DAY Yes Richy Correa MD   omeprazole (PRILOSEC) 20 MG delayed release capsule Take 1 capsule by mouth Daily Yes Richy Correa MD   losartan (COZAAR) 100 MG tablet Take 100 mg by mouth daily  Yes Historical Provider, MD   albuterol sulfate HFA (VENTOLIN HFA) 108 (90 Base) MCG/ACT inhaler Inhale 2 puffs into the lungs every 6 hours as needed for Wheezing or Shortness of Breath Yes Richy Correa MD   famciclovir (FAMVIR) 500 MG tablet Take 3 tablets as a single dose as directed Yes Henry Mazariegos MD   Coenzyme Q10 (CO Q 10 PO) Take by mouth as needed  Yes Historical Provider, MD   Fexofenadine HCl (ALLEGRA PO) Take by mouth as needed  Yes Historical Provider, MD   Cholecalciferol (VITAMIN D3) 250 MCG (35835 UT) TABS Take by mouth daily  Yes Historical Provider, MD   Multiple Vitamins-Minerals (THERAPEUTIC MULTIVIT/MINERAL PO) Take 1 tablet by mouth daily as needed  Yes Historical Provider, MD          Objective:      /88 (Site: Right Upper Arm, Position: Sitting, Cuff Size: Medium Adult)   Pulse 99   Temp 98.3 °F (36.8 °C)   Ht 5' 2\" (1.575 m)   Wt 170 lb (77.1 kg)   SpO2 97%   BMI 31.09 kg/m²      Physical Exam  Vitals and nursing note reviewed. Constitutional:       General: She is not in acute distress. Appearance: Normal appearance. She is not ill-appearing or toxic-appearing. HENT:      Head: Normocephalic and atraumatic. Right Ear: External ear normal.      Left Ear: External ear normal.      Nose: Nose normal.      Mouth/Throat:      Pharynx: Oropharynx is clear. Eyes:      General: No scleral icterus. Right eye: No discharge. Left eye: No discharge. Extraocular Movements: Extraocular movements intact. Conjunctiva/sclera: Conjunctivae normal.   Cardiovascular:      Rate and Rhythm: Normal rate and regular rhythm. Heart sounds: Normal heart sounds. Pulmonary:      Effort: Pulmonary effort is normal.      Breath sounds: Normal breath sounds. No wheezing or rales. Musculoskeletal:         General: No deformity. Cervical back: Normal range of motion and neck supple. No rigidity. Skin:     General: Skin is warm and dry. Findings: No rash. Neurological:      General: No focal deficit present. Mental Status: She is alert. Mental status is at baseline. Motor: No weakness. Psychiatric:         Mood and Affect: Mood normal.         Behavior: Behavior normal.            Assessment / Plan:      1. Chronic pain of both feet  Recently exacerbated by increased activity.   Continue current regimen, continue following with kinesiologist.  Contact clinic for any new/worsening symptoms.    - Handicap Placard MISC; by Does not apply route GOOD FOR 5 YEARS  Dispense: 1 each; Refill: 0  - Handicap Placard MISC; by Does not apply route GOOD FOR 5 YEARS  Dispense: 1 each; Refill: 0    2. Sleep apnea, unspecified type  Needing new CPAP equipment.   - UNABLE TO FIND; CPAP EQUIPMENT (nosepiece, pillow, tubing)  Dispense: 1 each; Refill: 0    3. Essential hypertension  Stable, well controlled. Continue current medications. 4. Hypertriglyceridemia  Labs stable, continuing to gradually improve. Continue trilipix as tolerated, emphasized importance of lifestyle modifications as patient is unable to tolerate cholesterol medications. Will monitor. I have spent 35 minutes on this patient encounter. Patient voiced understanding and agreement with plan. All questions/concerns were addressed, risks/side effects of medications were reviewed. Return precautions and after visit summary were provided. Return in about 5 months (around 7/7/2022). or earlier as needed.       Sonny Castro MD

## 2022-02-15 ENCOUNTER — PATIENT MESSAGE (OUTPATIENT)
Dept: INTERNAL MEDICINE CLINIC | Age: 62
End: 2022-02-15

## 2022-02-15 DIAGNOSIS — M79.672 CHRONIC PAIN OF BOTH FEET: ICD-10-CM

## 2022-02-15 DIAGNOSIS — M79.671 CHRONIC PAIN OF BOTH FEET: ICD-10-CM

## 2022-02-15 DIAGNOSIS — G89.29 CHRONIC PAIN OF BOTH FEET: ICD-10-CM

## 2022-02-16 NOTE — TELEPHONE ENCOUNTER
From: Norma Brandt  To: Dr. Melvin Lopez: 2/15/2022 4:43 PM EST  Subject: Refill of prescription     Per my recent office visit, Dr Katerina Mcfadden asked that I   Send an email when I need a refill of medication as previously prescribed. Please, if possible, refill   HYDROcodone-acetaminophen (NORCO) 5-325 MG per tablet [Genesis Jarrell MD] 2x day for foot pain. Please let me know of any updates. Thank you. Enma Price.

## 2022-02-17 RX ORDER — HYDROCODONE BITARTRATE AND ACETAMINOPHEN 5; 325 MG/1; MG/1
1 TABLET ORAL 2 TIMES DAILY PRN
Qty: 60 TABLET | Refills: 0 | Status: SHIPPED | OUTPATIENT
Start: 2022-02-17 | End: 2022-03-22 | Stop reason: SDUPTHER

## 2022-03-22 DIAGNOSIS — G89.29 CHRONIC PAIN OF BOTH FEET: ICD-10-CM

## 2022-03-22 DIAGNOSIS — M79.672 CHRONIC PAIN OF BOTH FEET: ICD-10-CM

## 2022-03-22 DIAGNOSIS — M79.671 CHRONIC PAIN OF BOTH FEET: ICD-10-CM

## 2022-03-24 RX ORDER — HYDROCODONE BITARTRATE AND ACETAMINOPHEN 5; 325 MG/1; MG/1
1 TABLET ORAL 2 TIMES DAILY PRN
Qty: 60 TABLET | Refills: 0 | Status: SHIPPED | OUTPATIENT
Start: 2022-03-24 | End: 2022-04-27 | Stop reason: SDUPTHER

## 2022-04-27 DIAGNOSIS — M79.672 CHRONIC PAIN OF BOTH FEET: ICD-10-CM

## 2022-04-27 DIAGNOSIS — G89.29 CHRONIC PAIN OF BOTH FEET: ICD-10-CM

## 2022-04-27 DIAGNOSIS — M79.671 CHRONIC PAIN OF BOTH FEET: ICD-10-CM

## 2022-04-28 RX ORDER — HYDROCODONE BITARTRATE AND ACETAMINOPHEN 5; 325 MG/1; MG/1
1 TABLET ORAL 2 TIMES DAILY PRN
Qty: 60 TABLET | Refills: 0 | Status: SHIPPED | OUTPATIENT
Start: 2022-04-28 | End: 2022-06-16 | Stop reason: SDUPTHER

## 2022-06-16 DIAGNOSIS — G89.29 CHRONIC PAIN OF BOTH FEET: ICD-10-CM

## 2022-06-16 DIAGNOSIS — M79.672 CHRONIC PAIN OF BOTH FEET: ICD-10-CM

## 2022-06-16 DIAGNOSIS — M79.671 CHRONIC PAIN OF BOTH FEET: ICD-10-CM

## 2022-06-16 RX ORDER — HYDROCODONE BITARTRATE AND ACETAMINOPHEN 5; 325 MG/1; MG/1
1 TABLET ORAL 2 TIMES DAILY PRN
Qty: 60 TABLET | Refills: 0 | Status: SHIPPED | OUTPATIENT
Start: 2022-06-16 | End: 2022-09-20 | Stop reason: SDUPTHER

## 2022-07-14 ENCOUNTER — OFFICE VISIT (OUTPATIENT)
Dept: INTERNAL MEDICINE CLINIC | Age: 62
End: 2022-07-14
Payer: COMMERCIAL

## 2022-07-14 VITALS
HEART RATE: 70 BPM | OXYGEN SATURATION: 98 % | SYSTOLIC BLOOD PRESSURE: 130 MMHG | BODY MASS INDEX: 32.37 KG/M2 | DIASTOLIC BLOOD PRESSURE: 74 MMHG | WEIGHT: 177 LBS

## 2022-07-14 DIAGNOSIS — G47.30 SLEEP APNEA, UNSPECIFIED TYPE: ICD-10-CM

## 2022-07-14 DIAGNOSIS — R73.03 PREDIABETES: ICD-10-CM

## 2022-07-14 DIAGNOSIS — E55.9 VITAMIN D DEFICIENCY: ICD-10-CM

## 2022-07-14 DIAGNOSIS — M79.672 CHRONIC PAIN OF BOTH FEET: ICD-10-CM

## 2022-07-14 DIAGNOSIS — E78.2 MIXED HYPERLIPIDEMIA: ICD-10-CM

## 2022-07-14 DIAGNOSIS — M79.671 CHRONIC PAIN OF BOTH FEET: ICD-10-CM

## 2022-07-14 DIAGNOSIS — E78.1 HYPERTRIGLYCERIDEMIA: ICD-10-CM

## 2022-07-14 DIAGNOSIS — I10 ESSENTIAL HYPERTENSION: Primary | ICD-10-CM

## 2022-07-14 DIAGNOSIS — K21.9 GASTROESOPHAGEAL REFLUX DISEASE, UNSPECIFIED WHETHER ESOPHAGITIS PRESENT: ICD-10-CM

## 2022-07-14 DIAGNOSIS — M1A.09X0 CHRONIC GOUT OF MULTIPLE SITES, UNSPECIFIED CAUSE: ICD-10-CM

## 2022-07-14 DIAGNOSIS — G89.29 CHRONIC PAIN OF BOTH FEET: ICD-10-CM

## 2022-07-14 PROCEDURE — 99214 OFFICE O/P EST MOD 30 MIN: CPT | Performed by: FAMILY MEDICINE

## 2022-07-14 RX ORDER — LOSARTAN POTASSIUM 100 MG/1
100 TABLET ORAL DAILY
Qty: 90 TABLET | Refills: 1 | Status: SHIPPED | OUTPATIENT
Start: 2022-07-14

## 2022-07-14 RX ORDER — DILTIAZEM HYDROCHLORIDE 300 MG/1
300 CAPSULE, COATED, EXTENDED RELEASE ORAL DAILY
Qty: 90 CAPSULE | Refills: 1 | Status: SHIPPED | OUTPATIENT
Start: 2022-07-14

## 2022-07-14 RX ORDER — FENOFIBRIC ACID 135 MG/1
CAPSULE, DELAYED RELEASE ORAL
Qty: 90 CAPSULE | Refills: 1 | Status: SHIPPED | OUTPATIENT
Start: 2022-07-14

## 2022-07-14 RX ORDER — OMEPRAZOLE 20 MG/1
20 CAPSULE, DELAYED RELEASE ORAL DAILY
Qty: 90 CAPSULE | Refills: 1 | Status: SHIPPED | OUTPATIENT
Start: 2022-07-14

## 2022-07-14 ASSESSMENT — ENCOUNTER SYMPTOMS
CHEST TIGHTNESS: 0
DIARRHEA: 0
SORE THROAT: 0
VOMITING: 0
SHORTNESS OF BREATH: 0
COLOR CHANGE: 0
CONSTIPATION: 0
ABDOMINAL PAIN: 0

## 2022-07-14 NOTE — PROGRESS NOTES
Subjective:      Chief Complaint   Patient presents with    Hypertension       HPI:  Charis Griffin is a 59 y.o. female who presents today for follow up of chronic conditions as listed below. HTN:  BP's normal at home. Still following with cardiology. Had EKG, no changes in medications. HLP:  Still taking trilipix as tolerated. JETHRO:  Did get CPAP equipment, states she uses it mostly every night. Chronic foot pain:  Reports symptoms are mostly stable, is taking 1-2 tabs of norco daily. Has tried tylenol, voltaren, NSAIDs (but upsets stomach). States was prescribed a compounded medication by a pain clinic years ago, but does not remember what it was. Tried aspercreme/OTC topicals, but states it can be painful to apply. Denies numbness/burning. Past Medical History:   Diagnosis Date    Fatty liver     by US    Fibromyalgia     GERD (gastroesophageal reflux disease)     by UGI    Gout     Hyperlipidemia     Hypertension     Hypoglycemia     IBS (irritable bowel syndrome)     Prolonged emergence from general anesthesia     PSVT (paroxysmal supraventricular tachycardia) (HCC)     s/p ablation        Past Surgical History:   Procedure Laterality Date    ABDOMEN SURGERY       Cholecystectomy    ABDOMEN SURGERY      Rt ovarian adhesions removed and cervical laser therapy/Dr Allen Iverson ABDOMEN SURGERY      Rt ovarian cyst removed    ABLATION OF DYSRHYTHMIC FOCUS      APPENDECTOMY      BONE CYST EXCISION  1980    Left bone cyst aneurysm removed    CHOLECYSTECTOMY      COLONOSCOPY  1/19/2016    diverticulosis    FOOT NEUROMA SURGERY Right     TONSILLECTOMY         Social History     Tobacco Use    Smoking status: Never Smoker    Smokeless tobacco: Never Used   Substance Use Topics    Alcohol use: Yes     Comment: occasionally        Review of Systems   Constitutional: Negative for activity change, appetite change, chills, fever and unexpected weight change.    HENT: Negative for congestion and sore throat. Respiratory: Negative for chest tightness and shortness of breath. Cardiovascular: Negative for chest pain and palpitations. Gastrointestinal: Negative for abdominal pain, constipation, diarrhea and vomiting. Genitourinary: Negative for dysuria. Musculoskeletal: Positive for arthralgias. Skin: Negative for color change. Neurological: Negative for dizziness and light-headedness. Psychiatric/Behavioral: Negative for dysphoric mood. The patient is not nervous/anxious. Prior to Visit Medications    Medication Sig Taking? Authorizing Provider   HYDROcodone-acetaminophen (NORCO) 5-325 MG per tablet Take 1 tablet by mouth 2 times daily as needed for Pain (back pain) for up to 60 days.  Yes Jana Zayas MD   Handicap [facility] [address] [facility] by Does not apply route GOOD FOR 5 YEARS Yes MD [place] [facility] by Does not apply route GOOD FOR 5 YEARS Yes Jana Zayas MD   famciclovir (FAMVIR) 500 MG tablet Take 3 tablets as a single dose as directed Yes Jana Zayas MD   UNABLE TO FIND CPAP EQUIPMENT (nosepiece, pillow, tubing) Yes Jana Zayas MD   dilTIAZem (DILTIAZEM CD) 300 MG extended release capsule Take 300 mg by mouth daily Yes Historical Provider, MD   choline fenofibrate (TRILIPIX) 135 MG CPDR delayed release capsule TAKE ONE CAPSULE BY MOUTH EVERY DAY Yes Jana Zayas MD   omeprazole (PRILOSEC) 20 MG delayed release capsule Take 1 capsule by mouth Daily Yes Jana Zayas MD   losartan (COZAAR) 100 MG tablet Take 100 mg by mouth daily  Yes Historical Provider, MD   Coenzyme Q10 (CO Q 10 PO) Take by mouth as needed  Yes Historical Provider, MD   Fexofenadine HCl (ALLEGRA PO) Take by mouth as needed  Yes Historical Provider, MD   Cholecalciferol (VITAMIN D3) 250 MCG (13471 UT) TABS Take by mouth daily  Yes Historical Provider, MD   Multiple Vitamins-Minerals (THERAPEUTIC MULTIVIT/MINERAL PO) Take 1 tablet by mouth daily as needed  Yes Historical Provider, MD   albuterol sulfate HFA (VENTOLIN HFA) 108 (90 Base) MCG/ACT inhaler Inhale 2 puffs into the lungs every 6 hours as needed for Wheezing or Shortness of Breath  Patient not taking: Reported on 7/14/2022  Dionisio Young MD          Objective:      /74   Pulse 70   Wt 177 lb (80.3 kg)   SpO2 98%   BMI 32.37 kg/m²      Physical Exam  Vitals and nursing note reviewed. Constitutional:       General: She is not in acute distress. Appearance: Normal appearance. She is not ill-appearing or toxic-appearing. HENT:      Head: Normocephalic and atraumatic. Right Ear: External ear normal.      Left Ear: External ear normal.      Nose: Nose normal.      Mouth/Throat:      Pharynx: Oropharynx is clear. Eyes:      General: No scleral icterus. Right eye: No discharge. Left eye: No discharge. Extraocular Movements: Extraocular movements intact. Conjunctiva/sclera: Conjunctivae normal.   Cardiovascular:      Rate and Rhythm: Normal rate and regular rhythm. Heart sounds: Normal heart sounds. Pulmonary:      Effort: Pulmonary effort is normal.      Breath sounds: Normal breath sounds. No wheezing or rales. Musculoskeletal:         General: No deformity. Cervical back: Normal range of motion and neck supple. No rigidity. Skin:     General: Skin is warm and dry. Findings: No rash. Neurological:      General: No focal deficit present. Mental Status: She is alert. Mental status is at baseline. Motor: No weakness. Psychiatric:         Mood and Affect: Mood normal.         Behavior: Behavior normal.            Assessment / Plan:      1. Essential hypertension  Stable, well controlled. Continue current medications. - losartan (COZAAR) 100 MG tablet; Take 1 tablet by mouth daily  Dispense: 90 tablet; Refill: 1  - dilTIAZem (DILTIAZEM CD) 300 MG extended release capsule;  Take 1 capsule by mouth daily  Dispense: 90 capsule; Refill: 1  - CBC with Auto Differential; Future  - Comprehensive Metabolic Panel; Future    2. Hypertriglyceridemia  Labs stable, continue current regimen. Following with cardiology. - choline fenofibrate (TRILIPIX) 135 MG CPDR delayed release capsule; TAKE ONE CAPSULE BY MOUTH EVERY DAY  Dispense: 90 capsule; Refill: 1  - Lipid Panel; Future    3. Chronic pain of both feet  Stable, patient interested in trying compounded medication again. Will contact [facility]. Pharmacy and order. 4. Sleep apnea, unspecified type  Continue CPAP. 5. Prediabetes  Will monitor.   - Hemoglobin A1C; Future    6. Mixed hyperlipidemia  - choline fenofibrate (TRILIPIX) 135 MG CPDR delayed release capsule; TAKE ONE CAPSULE BY MOUTH EVERY DAY  Dispense: 90 capsule; Refill: 1  - Lipid Panel; Future    7. Chronic gout of multiple sites, unspecified cause  Will recheck uric acid. - Uric Acid; Future    8. Vitamin D deficiency  Continue supplementation.   - Vitamin D 25 Hydroxy; Future    9. Gastroesophageal reflux disease, unspecified whether esophagitis present  Stable, well controlled. Continue current medications. - omeprazole (PRILOSEC) 20 MG delayed release capsule; Take 1 capsule by mouth Daily  Dispense: 90 capsule; Refill: 1          I have spent 31 minutes on this patient encounter. Patient voiced understanding and agreement with plan. All questions/concerns were addressed, risks/side effects of medications were reviewed. Return precautions and after visit summary were provided. Return in about 6 months (around 1/14/2023). or earlier as needed.       Afsaneh Cornell MD

## 2022-08-25 ENCOUNTER — OFFICE VISIT (OUTPATIENT)
Dept: BARIATRICS/WEIGHT MGMT | Age: 62
End: 2022-08-25

## 2022-08-25 VITALS — WEIGHT: 176.4 LBS | HEIGHT: 62 IN | BODY MASS INDEX: 32.46 KG/M2

## 2022-08-25 DIAGNOSIS — E66.9 OBESITY (BMI 30.0-34.9): Primary | ICD-10-CM

## 2022-08-25 PROCEDURE — 99999 PR OFFICE/OUTPT VISIT,PROCEDURE ONLY: CPT

## 2022-08-25 NOTE — PROGRESS NOTES
[facility] - Non-Surgical Weight Loss Program    REASON FOR VISIT: Initial Non-Surgical    Chief Complaint:    Chief Complaint   Patient presents with    Weight Management       SUBJECTIVE:  Pt here for initial consultation in non-surgical wt loss program.  The patient is a 57 y.o. female being seen for obesity, enrolled in Non-Surgical Weight Loss Program; [facility], Height: 5' 2\" (157.5 cm), Weight: 176 lb 6.4 oz (80 kg), Current Body mass index is 32.26 kg/m². patient's PCP is Scott Aguirre MD    Comorbid Conditions:  Significant diseases affecting this patient are   Past Medical History:   Diagnosis Date    Fatty liver     by US    Fibromyalgia     GERD (gastroesophageal reflux disease)     by UGI    Gout     Hyperlipidemia     Hypertension     Hypoglycemia     IBS (irritable bowel syndrome)     Prolonged emergence from general anesthesia     PSVT (paroxysmal supraventricular tachycardia) ([facility] [address]     s/p ablation   . Review of Systems - Review of Systems  Otherwise per HPI. Allergies:   Allergies   Allergen Reactions    Sulfa Antibiotics Rash    Allegra [Fexofenadine Hydrochloride]      Intol    Avelox [Moxifloxacin Hcl In Nacl]      Intol    Citalopram Hydrobromide      Intol    Erythromycin      Intol    Escitalopram Oxalate      Intol    Lopid [Gemfibrozil]      Intol    Lopressor [Metoprolol Tartrate] Itching    Paxil [Paroxetine]      Intol    Phenol      Intol      Relafen [Nabumetone]      Intol      Zetia [Ezetimibe]      Intol-diarrhea    Zyrtec [Cetirizine Hcl]      Intol    Other Diarrhea     Icosapent Ethyl (VASCEPA)       Past Surgical History:  Past Surgical History:   Procedure Laterality Date    ABDOMEN SURGERY       Cholecystectomy    ABDOMEN SURGERY      Rt ovarian adhesions removed and cervical laser therapy/Dr Maryanne Cota    ABDOMEN SURGERY      Rt ovarian cyst removed    ABLATION OF DYSRHYTHMIC FOCUS      APPENDECTOMY      BONE CYST EXCISION  1980    Left bone cyst aneurysm removed    CHOLECYSTECTOMY      COLONOSCOPY  1/19/2016    diverticulosis    FOOT NEUROMA SURGERY Right     TONSILLECTOMY         Family History:  Family History   Problem Relation Age of Onset    Arthritis Father     High Blood Pressure Father     High Cholesterol Father     Kidney Disease Father     Cancer Father 80        Multiple Myloma    Other Father     Depression Mother     Diabetes Mother     High Blood Pressure Mother     Mental Illness Mother     High Blood Pressure Brother     Stroke Brother     Substance Abuse Brother     Diabetes Paternal Uncle     Heart Disease Maternal Grandmother        Social History:  Social History     Socioeconomic History    Marital status:      Spouse name: Not on file    Number of children: Not on file    Years of education: Not on file    Highest education level: Not on file   Occupational History    Not on file   Tobacco Use    Smoking status: Never    Smokeless tobacco: Never   Vaping Use    Vaping Use: Never used   Substance and Sexual Activity    Alcohol use: Yes     Comment: occasionally    Drug use: No    Sexual activity: Not on file   Other Topics Concern    Not on file   Social History Narrative    Not on file     Social Determinants of Health     Financial Resource Strain: Low Risk     Difficulty of Paying Living Expenses: Not hard at all   Food Insecurity: No Food Insecurity    Worried About Running Out of Food in the Last Year: Never true    Ran Out of Food in the Last Year: Never true   Transportation Needs: Not on file   Physical Activity: Not on file   Stress: Not on file   Social Connections: Not on file   Intimate Partner Violence: Not on file   Housing Stability: Not on file         OBJECTIVE:  Physical Exam   Ht 5' 2\" (1.575 m)   Wt 176 lb 6.4 oz (80 kg)   BMI 32.26 kg/m²        NUTRITION DIAGNOSIS: Overweight / Obesity   Problem: Increased adiposity compared to reference standard or established norm   Etiology: Excess intake compared to output over time   S/S: Ht:5' 2\" Wt: 176 lb 6.4 oz BMI: 32.26 lb    NUTRITION INTERVENTIONS:    Individualized treatment goals to address nutrition diagnosis:   Instructed on 1200 calorie diet for weight loss   Provided sample menus, healthy foods list, recipes and product samples   Encouraged record keeping and physical activity as approved by physician    MONITORING/ EVALUATION/ PLAN:   Pt verbalized understanding of all materials covered   Pt asked pertinent questions throughout the session - expect compliance with nutrition guidelines presented   Provided pt with contact information should questions arise prior to next visit   Will f/u with pt weekly    Davonte Sanchez, MMSc, RD, LD

## 2022-08-29 RX ORDER — FAMCICLOVIR 500 MG/1
TABLET, FILM COATED ORAL
Qty: 6 TABLET | Refills: 2 | Status: SHIPPED | OUTPATIENT
Start: 2022-08-29

## 2022-08-31 ENCOUNTER — OFFICE VISIT (OUTPATIENT)
Dept: BARIATRICS/WEIGHT MGMT | Age: 62
End: 2022-08-31

## 2022-08-31 VITALS — BODY MASS INDEX: 32.28 KG/M2 | HEIGHT: 62 IN | WEIGHT: 175.4 LBS

## 2022-08-31 DIAGNOSIS — E66.9 OBESITY (BMI 30.0-34.9): Primary | ICD-10-CM

## 2022-08-31 PROCEDURE — 99999 PR OFFICE/OUTPT VISIT,PROCEDURE ONLY: CPT

## 2022-08-31 NOTE — PROGRESS NOTES
[facility] - Non-Surgical Weight Loss Program    REASON FOR VISIT:    Chief Complaint:    Chief Complaint   Patient presents with    Weight Management     Weigh In         OBJECTIVE:  Physical Exam   Ht 5' 2\" (1.575 m)   Wt 175 lb 6.4 oz (79.6 kg)   BMI 32.08 kg/m²                Patient lost 1lb

## 2022-09-07 ENCOUNTER — OFFICE VISIT (OUTPATIENT)
Dept: BARIATRICS/WEIGHT MGMT | Age: 62
End: 2022-09-07

## 2022-09-07 VITALS — WEIGHT: 174.2 LBS | HEIGHT: 62 IN | BODY MASS INDEX: 32.06 KG/M2

## 2022-09-07 DIAGNOSIS — E66.9 OBESITY (BMI 30.0-34.9): Primary | ICD-10-CM

## 2022-09-07 PROCEDURE — 99999 PR OFFICE/OUTPT VISIT,PROCEDURE ONLY: CPT

## 2022-09-07 NOTE — PROGRESS NOTES
[facility] - Non-Surgical Weight Loss Program    REASON FOR VISIT:    Chief Complaint:    Chief Complaint   Patient presents with    Weight Management     Weigh In         OBJECTIVE:  Physical Exam   Ht 5' 2\" (1.575 m)   Wt 174 lb 3.2 oz (79 kg)   BMI 31.86 kg/m²                Patient lost 1lb

## 2022-09-16 ENCOUNTER — OFFICE VISIT (OUTPATIENT)
Dept: BARIATRICS/WEIGHT MGMT | Age: 62
End: 2022-09-16

## 2022-09-16 ENCOUNTER — TELEPHONE (OUTPATIENT)
Dept: BARIATRICS/WEIGHT MGMT | Age: 62
End: 2022-09-16

## 2022-09-16 VITALS — HEIGHT: 62 IN | BODY MASS INDEX: 32.39 KG/M2 | WEIGHT: 176 LBS

## 2022-09-16 DIAGNOSIS — E66.9 OBESITY (BMI 30.0-34.9): Primary | ICD-10-CM

## 2022-09-16 PROCEDURE — 99999 PR OFFICE/OUTPT VISIT,PROCEDURE ONLY: CPT | Performed by: SURGERY

## 2022-09-16 NOTE — PROGRESS NOTES
[facility] - Non-Surgical Weight Loss Program    REASON FOR VISIT:    Chief Complaint:    Chief Complaint   Patient presents with    Weight Management     Weigh in         OBJECTIVE:  Physical Exam   Ht 5' 2\" (1.575 m)   Wt 176 lb (79.8 kg)   BMI 32.19 kg/m²      Patient gained 1.8 pounds.

## 2022-09-20 DIAGNOSIS — M79.672 CHRONIC PAIN OF BOTH FEET: ICD-10-CM

## 2022-09-20 DIAGNOSIS — G89.29 CHRONIC PAIN OF BOTH FEET: ICD-10-CM

## 2022-09-20 DIAGNOSIS — M79.671 CHRONIC PAIN OF BOTH FEET: ICD-10-CM

## 2022-09-20 RX ORDER — HYDROCODONE BITARTRATE AND ACETAMINOPHEN 5; 325 MG/1; MG/1
1 TABLET ORAL 2 TIMES DAILY PRN
Qty: 60 TABLET | Refills: 0 | Status: SHIPPED | OUTPATIENT
Start: 2022-09-20 | End: 2022-10-20 | Stop reason: SDUPTHER

## 2022-09-21 ENCOUNTER — OFFICE VISIT (OUTPATIENT)
Dept: BARIATRICS/WEIGHT MGMT | Age: 62
End: 2022-09-21

## 2022-09-21 VITALS — HEIGHT: 62 IN | WEIGHT: 175.6 LBS | BODY MASS INDEX: 32.31 KG/M2

## 2022-09-21 DIAGNOSIS — E66.9 OBESITY (BMI 30.0-34.9): Primary | ICD-10-CM

## 2022-09-21 PROCEDURE — 99999 PR OFFICE/OUTPT VISIT,PROCEDURE ONLY: CPT

## 2022-09-21 NOTE — PROGRESS NOTES
[facility] - Non-Surgical Weight Loss Program    REASON FOR VISIT:    Chief Complaint:    Chief Complaint   Patient presents with    Weight Management     Weigh In         OBJECTIVE:  Physical Exam   Ht 5' 2\" (1.575 m)   Wt 175 lb 9.6 oz (79.7 kg)   BMI 32.12 kg/m²                Patient lost 0.4lbs

## 2022-09-28 ENCOUNTER — OFFICE VISIT (OUTPATIENT)
Dept: BARIATRICS/WEIGHT MGMT | Age: 62
End: 2022-09-28

## 2022-09-28 VITALS — HEIGHT: 62 IN | WEIGHT: 176.2 LBS | BODY MASS INDEX: 32.42 KG/M2

## 2022-09-28 DIAGNOSIS — E66.9 OBESITY (BMI 30.0-34.9): Primary | ICD-10-CM

## 2022-09-28 PROCEDURE — 99999 PR OFFICE/OUTPT VISIT,PROCEDURE ONLY: CPT

## 2022-09-28 NOTE — PROGRESS NOTES
[facility] - Non-Surgical Weight Loss Program    REASON FOR VISIT:    Chief Complaint:    Chief Complaint   Patient presents with    Weight Management     Weigh in           OBJECTIVE:  Physical Exam   Ht 5' 2\" (1.575 m)   Wt 176 lb 3.2 oz (79.9 kg)   BMI 32.23 kg/m²                Patient gained 0.6lbs

## 2022-09-30 ENCOUNTER — PATIENT MESSAGE (OUTPATIENT)
Dept: INTERNAL MEDICINE CLINIC | Age: 62
End: 2022-09-30

## 2022-10-03 NOTE — TELEPHONE ENCOUNTER
I'm not sure of the exact name of the compound that she is currently receiving, but could you call [facility] and order 3 refills for her current compounded RX? Please notify patient afterwards, thank you!

## 2022-10-05 ENCOUNTER — OFFICE VISIT (OUTPATIENT)
Dept: BARIATRICS/WEIGHT MGMT | Age: 62
End: 2022-10-05

## 2022-10-05 VITALS — WEIGHT: 176.6 LBS | BODY MASS INDEX: 32.5 KG/M2 | HEIGHT: 62 IN

## 2022-10-05 DIAGNOSIS — E66.9 OBESITY (BMI 30.0-34.9): Primary | ICD-10-CM

## 2022-10-05 PROCEDURE — 99999 PR OFFICE/OUTPT VISIT,PROCEDURE ONLY: CPT

## 2022-10-05 NOTE — PROGRESS NOTES
[facility] - Non-Surgical Weight Loss Program    REASON FOR VISIT:    Chief Complaint:    Chief Complaint   Patient presents with    Weight Management     Weigh In         OBJECTIVE:  Physical Exam   Ht 5' 2\" (1.575 m)   Wt 176 lb 9.6 oz (80.1 kg)   BMI 32.30 kg/m²                Patient gained 0.4lbs

## 2022-10-19 ENCOUNTER — OFFICE VISIT (OUTPATIENT)
Dept: BARIATRICS/WEIGHT MGMT | Age: 62
End: 2022-10-19

## 2022-10-19 VITALS — BODY MASS INDEX: 32.42 KG/M2 | HEIGHT: 62 IN | WEIGHT: 176.2 LBS

## 2022-10-19 DIAGNOSIS — E66.9 OBESITY (BMI 30.0-34.9): Primary | ICD-10-CM

## 2022-10-19 PROCEDURE — 99999 PR OFFICE/OUTPT VISIT,PROCEDURE ONLY: CPT | Performed by: SURGERY

## 2022-10-19 NOTE — PROGRESS NOTES
[facility] - Non-Surgical Weight Loss Program    REASON FOR VISIT:    Chief Complaint:    Chief Complaint   Patient presents with    Weight Management     Weigh In         OBJECTIVE:  Physical Exam   Ht 5' 2\" (1.575 m)   Wt 176 lb 3.2 oz (79.9 kg)   BMI 32.23 kg/m²                Patient lost 0.4lbs PAIN

## 2022-10-20 DIAGNOSIS — G89.29 CHRONIC PAIN OF BOTH FEET: ICD-10-CM

## 2022-10-20 DIAGNOSIS — M79.672 CHRONIC PAIN OF BOTH FEET: ICD-10-CM

## 2022-10-20 DIAGNOSIS — M79.671 CHRONIC PAIN OF BOTH FEET: ICD-10-CM

## 2022-10-21 RX ORDER — HYDROCODONE BITARTRATE AND ACETAMINOPHEN 5; 325 MG/1; MG/1
1 TABLET ORAL 2 TIMES DAILY PRN
Qty: 60 TABLET | Refills: 0 | Status: SHIPPED | OUTPATIENT
Start: 2022-10-21 | End: 2022-12-20

## 2022-10-26 ENCOUNTER — OFFICE VISIT (OUTPATIENT)
Dept: BARIATRICS/WEIGHT MGMT | Age: 62
End: 2022-10-26

## 2022-10-26 VITALS — BODY MASS INDEX: 32.5 KG/M2 | WEIGHT: 176.6 LBS | HEIGHT: 62 IN

## 2022-10-26 DIAGNOSIS — E66.9 OBESITY (BMI 30.0-34.9): Primary | ICD-10-CM

## 2022-10-26 PROCEDURE — 99999 PR OFFICE/OUTPT VISIT,PROCEDURE ONLY: CPT

## 2022-10-27 ENCOUNTER — HOSPITAL ENCOUNTER (OUTPATIENT)
Dept: GENERAL RADIOLOGY | Age: 62
Discharge: HOME | End: 2022-10-27
Payer: COMMERCIAL

## 2022-10-27 ENCOUNTER — TELEPHONE (OUTPATIENT)
Dept: INTERNAL MEDICINE CLINIC | Age: 62
End: 2022-10-27

## 2022-10-27 ENCOUNTER — HOSPITAL ENCOUNTER (OUTPATIENT)
Age: 62
Discharge: HOME | End: 2022-10-27
Payer: COMMERCIAL

## 2022-10-27 ENCOUNTER — OFFICE VISIT (OUTPATIENT)
Dept: INTERNAL MEDICINE CLINIC | Age: 62
End: 2022-10-27
Payer: COMMERCIAL

## 2022-10-27 VITALS
SYSTOLIC BLOOD PRESSURE: 142 MMHG | DIASTOLIC BLOOD PRESSURE: 84 MMHG | HEART RATE: 79 BPM | OXYGEN SATURATION: 98 % | HEIGHT: 62 IN | WEIGHT: 179.8 LBS | BODY MASS INDEX: 33.09 KG/M2

## 2022-10-27 DIAGNOSIS — G89.29 CHRONIC FOOT PAIN, UNSPECIFIED LATERALITY: ICD-10-CM

## 2022-10-27 DIAGNOSIS — M79.673 CHRONIC FOOT PAIN, UNSPECIFIED LATERALITY: ICD-10-CM

## 2022-10-27 DIAGNOSIS — M25.571 ACUTE RIGHT ANKLE PAIN: ICD-10-CM

## 2022-10-27 DIAGNOSIS — M25.571 ACUTE RIGHT ANKLE PAIN: Primary | ICD-10-CM

## 2022-10-27 PROCEDURE — 73610 X-RAY EXAM OF ANKLE: CPT

## 2022-10-27 PROCEDURE — 99213 OFFICE O/P EST LOW 20 MIN: CPT | Performed by: FAMILY MEDICINE

## 2022-10-27 PROCEDURE — 3074F SYST BP LT 130 MM HG: CPT | Performed by: FAMILY MEDICINE

## 2022-10-27 PROCEDURE — 3078F DIAST BP <80 MM HG: CPT | Performed by: FAMILY MEDICINE

## 2022-10-27 ASSESSMENT — ENCOUNTER SYMPTOMS
SHORTNESS OF BREATH: 0
SORE THROAT: 0
COLOR CHANGE: 0
DIARRHEA: 0
VOMITING: 0
ABDOMINAL PAIN: 0
CHEST TIGHTNESS: 0
CONSTIPATION: 0

## 2022-10-27 NOTE — PROGRESS NOTES
Subjective:      Chief Complaint   Patient presents with    Ankle Injury     Pt rolled ankle (both but mainly right). Feels bruised        HPI:  Virginia Ely is a 57 y.o. female who presents today for ankle pain. States she twisted her ankle 2 days ago. States there was no significant swelling, but has been having pain on the outside of her ankle since that time. Given her chronic foot pain issues, was interested in podiatry referral.     No other concerns today. Past Medical History:   Diagnosis Date    Fatty liver     by US    Fibromyalgia     GERD (gastroesophageal reflux disease)     by UGI    Gout     Hyperlipidemia     Hypertension     Hypoglycemia     IBS (irritable bowel syndrome)     Prolonged emergence from general anesthesia     PSVT (paroxysmal supraventricular tachycardia) ([address]     s/p ablation        Past Surgical History:   Procedure Laterality Date    ABDOMEN SURGERY       Cholecystectomy    ABDOMEN SURGERY      Rt ovarian adhesions removed and cervical laser therapy/Dr Peg Oliveira    ABDOMEN SURGERY      Rt ovarian cyst removed    ABLATION OF DYSRHYTHMIC FOCUS      APPENDECTOMY      BONE CYST EXCISION  1980    Left bone cyst aneurysm removed    CHOLECYSTECTOMY      COLONOSCOPY  1/19/2016    diverticulosis    FOOT NEUROMA SURGERY Right     TONSILLECTOMY         Social History     Tobacco Use    Smoking status: Never    Smokeless tobacco: Never   Substance Use Topics    Alcohol use: Yes     Comment: occasionally        Review of Systems   Constitutional:  Negative for activity change, appetite change, chills, fever and unexpected weight change. HENT:  Negative for congestion and sore throat. Respiratory:  Negative for chest tightness and shortness of breath. Cardiovascular:  Negative for chest pain and palpitations. Gastrointestinal:  Negative for abdominal pain, constipation, diarrhea and vomiting. Genitourinary:  Negative for dysuria.    Musculoskeletal:  Positive for arthralgias. Skin:  Negative for color change. Neurological:  Negative for dizziness and light-headedness. Psychiatric/Behavioral:  Negative for dysphoric mood. The patient is not nervous/anxious. Prior to Visit Medications    Medication Sig Taking? Authorizing Provider   HYDROcodone-acetaminophen (NORCO) 5-325 MG per tablet Take 1 tablet by mouth 2 times daily as needed for Pain (back pain) for up to 60 days. Yes Shabbir Bhandari MD   famciclovir (FAMVIR) 500 MG tablet Take 3 tablets as a single dose as directed Yes Shabbir Bhandari MD   omeprazole (PRILOSEC) 20 MG delayed release capsule Take 1 capsule by mouth Daily Yes Shabbir Bhandari MD   losartan (COZAAR) 100 MG tablet Take 1 tablet by mouth daily Yes Shabbir Bhandari MD   dilTIAZem (DILTIAZEM CD) 300 MG extended release capsule Take 1 capsule by mouth daily Yes Shabbir Bhandari MD   choline fenofibrate (TRILIPIX) 135 MG CPDR delayed release capsule TAKE ONE CAPSULE BY MOUTH EVERY DAY Yes Shabbir Bhandari MD   Handicap Placard [address] [facility] by Does not apply route GOOD FOR 5 YEARS Yes Shabbir Bhandari MD   Handicap [address] by Does not apply route GOOD FOR 5 YEARS Yes Shabbir Bhandari MD   UNABLE TO FIND CPAP EQUIPMENT (nosepiece, pillow, tubing) Yes Shabbir Bhandari MD   Coenzyme Q10 (CO Q 10 PO) Take by mouth as needed  Yes Historical Provider, MD   Fexofenadine HCl (ALLEGRA PO) Take by mouth as needed  Yes Historical Provider, MD   Cholecalciferol (VITAMIN D3) 250 MCG (89289 UT) TABS Take by mouth daily  Yes Historical Provider, MD   Multiple Vitamins-Minerals (THERAPEUTIC MULTIVIT/MINERAL PO) Take 1 tablet by mouth daily as needed  Yes Historical Provider, MD          Objective:      BP (!) 142/84 (Site: Left Upper Arm, Position: Sitting, Cuff Size: Medium Adult)   Pulse 79   Ht 5' 2\" (1.575 m)   Wt 179 lb 12.8 oz (81.6 kg)   SpO2 98%   BMI 32.89 kg/m²      Physical Exam  Vitals and nursing note reviewed. Constitutional:       General: She is not in acute distress. Appearance: Normal appearance. She is not ill-appearing or toxic-appearing. HENT:      Head: Normocephalic and atraumatic. Right Ear: External ear normal.      Left Ear: External ear normal.      Nose: Nose normal.      Mouth/Throat:      Pharynx: Oropharynx is clear. Eyes:      General: No scleral icterus. Right eye: No discharge. Left eye: No discharge. Extraocular Movements: Extraocular movements intact. Conjunctiva/sclera: Conjunctivae normal.   Cardiovascular:      Rate and Rhythm: Normal rate and regular rhythm. Heart sounds: Normal heart sounds. Pulmonary:      Effort: Pulmonary effort is normal.      Breath sounds: Normal breath sounds. No wheezing or rales. Musculoskeletal:         General: Tenderness (tender under lateral malleolus; full ROM of ankle with no pain) and signs of injury present. No swelling or deformity. Cervical back: Normal range of motion and neck supple. No rigidity. Skin:     General: Skin is warm and dry. Findings: No bruising or rash. Neurological:      General: No focal deficit present. Mental Status: She is alert. Mental status is at baseline. Motor: No weakness. Psychiatric:         Mood and Affect: Mood normal.         Behavior: Behavior normal.          Assessment / Plan:      1. Acute right ankle pain  Do not suspect fracture but given injury with point tenderness on exam, will check XR. Likely sprain- advised ice, compression, elevation as needed. - XR ANKLE RIGHT (MIN 3 VIEWS); Future  - Amb External Referral To Podiatry    2. Chronic foot pain, unspecified laterality  Patient requesting referral to podiatry for history of chronic foot pain. - Amb External Referral To Podiatry        I have spent 20 minutes on this patient encounter. Patient voiced understanding and agreement with plan.   All questions/concerns were addressed, risks/side effects of medications were reviewed. Return precautions and after visit summary were provided. Return if symptoms worsen or fail to improve. or earlier as needed.       Guillermo Ochoa MD

## 2022-10-27 NOTE — TELEPHONE ENCOUNTER
Pt called stating that she missed a step and rolled her ankles. Her right ankle feels bruised. Pt states when she puts all her weight on her left foot she feels unsteady. Pt requesting to see PCP today. Scheduled pt to see PCP today.

## 2022-11-02 ENCOUNTER — OFFICE VISIT (OUTPATIENT)
Dept: BARIATRICS/WEIGHT MGMT | Age: 62
End: 2022-11-02

## 2022-11-02 VITALS — BODY MASS INDEX: 32.5 KG/M2 | WEIGHT: 176.6 LBS | HEIGHT: 62 IN

## 2022-11-02 DIAGNOSIS — E66.9 OBESITY (BMI 30.0-34.9): Primary | ICD-10-CM

## 2022-11-02 PROCEDURE — 99999 PR OFFICE/OUTPT VISIT,PROCEDURE ONLY: CPT | Performed by: SURGERY

## 2022-11-02 NOTE — PROGRESS NOTES
[facility] - Non-Surgical Weight Loss Program    REASON FOR VISIT:    Chief Complaint:    Chief Complaint   Patient presents with    Weight Management     Weigh In         OBJECTIVE:  Physical Exam   Ht 5' 2\" (1.575 m)   Wt 176 lb 9.6 oz (80.1 kg)   BMI 32.30 kg/m²                Patient weight stayed the same

## 2022-11-03 ENCOUNTER — OFFICE VISIT (OUTPATIENT)
Dept: BARIATRICS/WEIGHT MGMT | Age: 62
End: 2022-11-03

## 2022-11-03 ENCOUNTER — TELEPHONE (OUTPATIENT)
Dept: INTERNAL MEDICINE CLINIC | Age: 62
End: 2022-11-03

## 2022-11-03 VITALS — HEIGHT: 62 IN | WEIGHT: 178 LBS | BODY MASS INDEX: 32.76 KG/M2

## 2022-11-03 DIAGNOSIS — E66.9 OBESITY (BMI 30.0-34.9): Primary | ICD-10-CM

## 2022-11-03 PROCEDURE — 99999 PR OFFICE/OUTPT VISIT,PROCEDURE ONLY: CPT | Performed by: SURGERY

## 2022-11-03 NOTE — PROGRESS NOTES
[facility] - Non-Surgical Weight Loss Program    REASON FOR VISIT: F/U Non-Surgical Weight Loss Program    Chief Complaint:    Chief Complaint   Patient presents with    Weight Management       SUBJECTIVE:  Pt here for f/u nutrition consultation in non-surgical weight loss program. Pt is struggling without results lately. Reviewed importance of lowering sugar, fats, carbs with adequate protein and vegetables. The patient is a 57 y.o. female being seen for obesity, enrolled in Non-Surgical Weight Loss Program; [facility], Height: 5' 2\" (157.5 cm), Weight: 178 lb (80.7 kg), Current Body mass index is 32.56 kg/m². patient's PCP is Claudia Villanueva MD    Comorbid Conditions:  Significant diseases affecting this patient are   Past Medical History:   Diagnosis Date    Fatty liver     by US    Fibromyalgia     GERD (gastroesophageal reflux disease)     by UGI    Gout     Hyperlipidemia     Hypertension     Hypoglycemia     IBS (irritable bowel syndrome)     Prolonged emergence from general anesthesia     PSVT (paroxysmal supraventricular tachycardia) ([facility] [address]     s/p ablation   . Review of Systems - Review of Systems  Otherwise per HPI. Allergies:   Allergies   Allergen Reactions    Sulfa Antibiotics Rash    Allegra [Fexofenadine Hydrochloride]      Intol    Avelox [Moxifloxacin Hcl In Nacl]      Intol    Citalopram Hydrobromide      Intol    Erythromycin      Intol    Escitalopram Oxalate      Intol    Lopid [Gemfibrozil]      Intol    Lopressor [Metoprolol Tartrate] Itching    Paxil [Paroxetine]      Intol    Phenol      Intol      Relafen [Nabumetone]      Intol      Zetia [Ezetimibe]      Intol-diarrhea    Zyrtec [Cetirizine Hcl]      Intol    Other Diarrhea     Icosapent Ethyl (VASCEPA)       Past Surgical History:  Past Surgical History:   Procedure Laterality Date    ABDOMEN SURGERY       Cholecystectomy    ABDOMEN SURGERY      Rt ovarian adhesions removed and cervical laser therapy/ DIAGNOSIS: Overweight / Obesity   Problem: Increased adiposity compared to reference standard or established norm   Etiology: Excess intake compared to output over time   S/S: Ht: 5' 2\" Wt: 178 lb BMI: 32.56    NUTRITION INTERVENTIONS:    Individualized treatment goals to address nutrition diagnosis:   Instructed on 1200 calorie diet for weight loss   Provided sample menus, healthy foods list, and plate model   Encouraged record keeping and physical activity as approved by physician    MONITORING/ EVALUATION/ PLAN:   Pt verbalized understanding of all materials covered   Pt asked pertinent questions throughout the session - expect compliance with nutrition guidelines presented   Provided pt with contact information should questions arise prior to next visit   Will f/u with pt weekly    SARY Gamboac, RD, LD

## 2022-11-09 ENCOUNTER — OFFICE VISIT (OUTPATIENT)
Dept: BARIATRICS/WEIGHT MGMT | Age: 62
End: 2022-11-09

## 2022-11-09 VITALS — HEIGHT: 62 IN | BODY MASS INDEX: 32.57 KG/M2 | WEIGHT: 177 LBS

## 2022-11-09 DIAGNOSIS — E66.9 OBESITY (BMI 30.0-34.9): Primary | ICD-10-CM

## 2022-11-09 PROCEDURE — 99999 PR OFFICE/OUTPT VISIT,PROCEDURE ONLY: CPT | Performed by: SURGERY

## 2022-11-09 NOTE — PROGRESS NOTES
[facility] - Non-Surgical Weight Loss Program    REASON FOR VISIT:    Chief Complaint:    Chief Complaint   Patient presents with    Weight Management     Weigh In         OBJECTIVE:  Physical Exam   Ht 5' 2\" (1.575 m)   Wt 177 lb (80.3 kg)   BMI 32.37 kg/m²                Patient lost 1lbs

## 2022-11-16 ENCOUNTER — OFFICE VISIT (OUTPATIENT)
Dept: BARIATRICS/WEIGHT MGMT | Age: 62
End: 2022-11-16

## 2022-11-16 VITALS — HEIGHT: 62 IN | WEIGHT: 176.2 LBS | BODY MASS INDEX: 32.42 KG/M2

## 2022-11-16 DIAGNOSIS — E66.9 OBESITY (BMI 30.0-34.9): Primary | ICD-10-CM

## 2022-11-16 PROCEDURE — 99999 PR OFFICE/OUTPT VISIT,PROCEDURE ONLY: CPT

## 2022-11-18 ENCOUNTER — TELEPHONE (OUTPATIENT)
Dept: INTERNAL MEDICINE CLINIC | Age: 62
End: 2022-11-18

## 2022-11-18 NOTE — TELEPHONE ENCOUNTER
Medical records request for Transfer of care received and all pertinent information Mailed to [facility] [address]

## 2022-11-30 ENCOUNTER — OFFICE VISIT (OUTPATIENT)
Dept: BARIATRICS/WEIGHT MGMT | Age: 62
End: 2022-11-30

## 2022-11-30 VITALS — BODY MASS INDEX: 32.57 KG/M2 | HEIGHT: 62 IN | WEIGHT: 177 LBS

## 2022-11-30 DIAGNOSIS — E66.9 OBESITY (BMI 30.0-34.9): Primary | ICD-10-CM

## 2022-11-30 NOTE — PROGRESS NOTES
[facility]patient[facility]rition Counsel[facility] - Non-Surgical Weight Loss Program    REASON FOR VISIT:    Chief Complaint:    Chief Complaint   Patient presents with    Weight Management     Weigh In         OBJECTIVE:  Physical Exam   Ht 5' 2\" (1.575 m)   Wt 177 lb (80.3 kg)   BMI 32.37 kg/m²                Patient gained 0.8lbs

## 2022-12-07 ENCOUNTER — OFFICE VISIT (OUTPATIENT)
Dept: BARIATRICS/WEIGHT MGMT | Age: 62
End: 2022-12-07

## 2022-12-07 VITALS — HEIGHT: 62 IN | WEIGHT: 177.4 LBS | BODY MASS INDEX: 32.65 KG/M2

## 2022-12-07 DIAGNOSIS — E66.9 OBESITY (BMI 30.0-34.9): Primary | ICD-10-CM

## 2022-12-07 PROCEDURE — 99999 PR OFFICE/OUTPT VISIT,PROCEDURE ONLY: CPT | Performed by: SURGERY

## 2022-12-07 NOTE — PROGRESS NOTES
[facility]patient[facility]rition [facility] - Non-Surgical Weight Loss Program    REASON FOR VISIT:    Chief Complaint:    Chief Complaint   Patient presents with    Weight Loss     Weigh In         OBJECTIVE:  Physical Exam   Ht 5' 2\" (1.575 m)   Wt 177 lb 6.4 oz (80.5 kg)   BMI 32.45 kg/m²                Patient gained 0.4lbs

## 2022-12-14 ENCOUNTER — OFFICE VISIT (OUTPATIENT)
Dept: BARIATRICS/WEIGHT MGMT | Age: 62
End: 2022-12-14

## 2022-12-14 VITALS — WEIGHT: 177.6 LBS | HEIGHT: 62 IN | BODY MASS INDEX: 32.68 KG/M2

## 2022-12-14 DIAGNOSIS — E66.9 OBESITY (BMI 30.0-34.9): Primary | ICD-10-CM

## 2022-12-14 PROCEDURE — 99999 PR OFFICE/OUTPT VISIT,PROCEDURE ONLY: CPT | Performed by: SURGERY

## 2022-12-14 NOTE — PROGRESS NOTES
[facility]patient[facility] - Non-Surgical Weight Loss Program    REASON FOR VISIT:    Chief Complaint:    Chief Complaint   Patient presents with    Weight Loss     Weigh In         OBJECTIVE:  Physical Exam   Ht 5' 2\" (1.575 m)   Wt 177 lb 9.6 oz (80.6 kg)   BMI 32.48 kg/m²                Patient gained 0.2lbs

## 2022-12-21 ENCOUNTER — OFFICE VISIT (OUTPATIENT)
Dept: BARIATRICS/WEIGHT MGMT | Age: 62
End: 2022-12-21

## 2022-12-21 VITALS — WEIGHT: 177.2 LBS | BODY MASS INDEX: 32.61 KG/M2 | HEIGHT: 62 IN

## 2022-12-21 DIAGNOSIS — E66.9 OBESITY (BMI 30.0-34.9): Primary | ICD-10-CM

## 2022-12-21 PROCEDURE — 99999 PR OFFICE/OUTPT VISIT,PROCEDURE ONLY: CPT | Performed by: SURGERY

## 2022-12-21 NOTE — PROGRESS NOTES
[facility]patient[facility]rition Counsel[facility] - Non-Surgical Weight Loss Program    REASON FOR VISIT:    Chief Complaint:    Chief Complaint   Patient presents with    Weight Management     Weigh In         OBJECTIVE:  Physical Exam   Ht 5' 2\" (1.575 m)   Wt 177 lb 3.2 oz (80.4 kg)   BMI 32.41 kg/m²           Patient lost 0.4lbs

## 2022-12-28 ENCOUNTER — OFFICE VISIT (OUTPATIENT)
Dept: BARIATRICS/WEIGHT MGMT | Age: 62
End: 2022-12-28

## 2022-12-28 VITALS — WEIGHT: 175.4 LBS | BODY MASS INDEX: 32.28 KG/M2 | HEIGHT: 62 IN

## 2022-12-28 DIAGNOSIS — E66.9 OBESITY (BMI 30.0-34.9): Primary | ICD-10-CM

## 2022-12-28 PROCEDURE — 99999 PR OFFICE/OUTPT VISIT,PROCEDURE ONLY: CPT | Performed by: SURGERY

## 2022-12-28 NOTE — PROGRESS NOTES
[facility]patient[facility] - Non-Surgical Weight Loss Program    REASON FOR VISIT:    Chief Complaint:    Chief Complaint   Patient presents with    Weight Loss     Weigh In         OBJECTIVE:  Physical Exam   Ht 5' 2\" (1.575 m)   Wt 175 lb 6.4 oz (79.6 kg)   BMI 32.08 kg/m²                Patient lost 1.8lbs

## 2023-01-04 ENCOUNTER — OFFICE VISIT (OUTPATIENT)
Dept: BARIATRICS/WEIGHT MGMT | Age: 63
End: 2023-01-04

## 2023-01-04 VITALS — WEIGHT: 176 LBS | BODY MASS INDEX: 32.39 KG/M2 | HEIGHT: 62 IN

## 2023-01-04 DIAGNOSIS — E66.9 OBESITY (BMI 30.0-34.9): Primary | ICD-10-CM

## 2023-01-04 PROCEDURE — 99999 PR OFFICE/OUTPT VISIT,PROCEDURE ONLY: CPT | Performed by: SURGERY

## 2023-01-04 NOTE — PROGRESS NOTES
[facility]patient[facility]rition Counsel[facility] - Non-Surgical Weight Loss Program    REASON FOR VISIT:    Chief Complaint:    Chief Complaint   Patient presents with    Weight Loss     Weigh in         OBJECTIVE:  Physical Exam   Ht 5' 2\" (1.575 m)   Wt 176 lb (79.8 kg)   BMI 32.19 kg/m²                Patient gained 0.6lbs

## 2023-01-18 ENCOUNTER — OFFICE VISIT (OUTPATIENT)
Dept: BARIATRICS/WEIGHT MGMT | Age: 63
End: 2023-01-18

## 2023-01-18 DIAGNOSIS — E66.9 OBESITY (BMI 30.0-34.9): Primary | ICD-10-CM

## 2023-01-18 PROCEDURE — 99999 PR OFFICE/OUTPT VISIT,PROCEDURE ONLY: CPT | Performed by: SURGERY

## 2023-01-18 NOTE — PROGRESS NOTES
[facility]patient[facility]rition Counseling - Non-Surgical Weight Loss Program    REASON FOR VISIT:    Chief Complaint:    Chief Complaint   Patient presents with    Weight Loss     Weigh loss         OBJECTIVE:  Physical Exam   There were no vitals taken for this visit.                Patient lost 1.4lbs

## 2023-02-01 ENCOUNTER — OFFICE VISIT (OUTPATIENT)
Dept: BARIATRICS/WEIGHT MGMT | Age: 63
End: 2023-02-01

## 2023-02-01 VITALS — BODY MASS INDEX: 31.8 KG/M2 | HEIGHT: 62 IN | WEIGHT: 172.8 LBS

## 2023-02-01 DIAGNOSIS — E66.9 OBESITY (BMI 30.0-34.9): Primary | ICD-10-CM

## 2023-02-01 LAB
AVERAGE GLUCOSE: 123
HBA1C MFR BLD: 5.9 %

## 2023-02-01 PROCEDURE — 99999 PR OFFICE/OUTPT VISIT,PROCEDURE ONLY: CPT | Performed by: SURGERY

## 2023-02-01 NOTE — PROGRESS NOTES
[facility]patient[facility]rition Counsel[facility] - Non-Surgical Weight Loss Program    REASON FOR VISIT:    Chief Complaint:    Chief Complaint   Patient presents with    Weight Management     Weigh In         OBJECTIVE:  Physical Exam   Ht 5' 2\" (1.575 m)   Wt 172 lb 12.8 oz (78.4 kg)   BMI 31.61 kg/m²                Patient lost 1.8lbs

## 2023-02-08 ENCOUNTER — OFFICE VISIT (OUTPATIENT)
Dept: BARIATRICS/WEIGHT MGMT | Age: 63
End: 2023-02-08

## 2023-02-08 VITALS — HEIGHT: 62 IN | WEIGHT: 175.2 LBS | BODY MASS INDEX: 32.24 KG/M2

## 2023-02-08 DIAGNOSIS — E66.9 OBESITY (BMI 30.0-34.9): Primary | ICD-10-CM

## 2023-02-08 PROCEDURE — 99999 PR OFFICE/OUTPT VISIT,PROCEDURE ONLY: CPT | Performed by: SURGERY

## 2023-02-08 NOTE — PROGRESS NOTES
[facility]patient[facility]rition Counseling - Non-Surgical Weight Loss Program    REASON FOR VISIT:    Chief Complaint:    Chief Complaint   Patient presents with    Weight Management     Weigh In         OBJECTIVE:  Physical Exam   Ht 5' 2\" (1.575 m)   Wt 175 lb 3.2 oz (79.5 kg)   BMI 32.04 kg/m²                Patient gained 2.4lbs

## 2023-02-15 ENCOUNTER — OFFICE VISIT (OUTPATIENT)
Dept: BARIATRICS/WEIGHT MGMT | Age: 63
End: 2023-02-15

## 2023-02-15 VITALS — HEIGHT: 62 IN | BODY MASS INDEX: 32.06 KG/M2 | WEIGHT: 174.2 LBS

## 2023-02-15 DIAGNOSIS — E66.9 OBESITY (BMI 30.0-34.9): Primary | ICD-10-CM

## 2023-02-15 PROCEDURE — 99999 PR OFFICE/OUTPT VISIT,PROCEDURE ONLY: CPT | Performed by: SURGERY

## 2023-03-01 ENCOUNTER — OFFICE VISIT (OUTPATIENT)
Dept: BARIATRICS/WEIGHT MGMT | Age: 63
End: 2023-03-01

## 2023-03-01 VITALS — HEIGHT: 62 IN | BODY MASS INDEX: 32.35 KG/M2 | WEIGHT: 175.8 LBS

## 2023-03-01 DIAGNOSIS — E66.9 OBESITY (BMI 30.0-34.9): Primary | ICD-10-CM

## 2023-03-01 PROCEDURE — 99999 PR OFFICE/OUTPT VISIT,PROCEDURE ONLY: CPT | Performed by: SURGERY

## 2023-03-01 NOTE — PROGRESS NOTES
Outpatient[facility]rition Counseling - Non-Surgical Weight Loss Program    REASON FOR VISIT:    Chief Complaint:    Chief Complaint   Patient presents with    Weight Management     Weigh In         OBJECTIVE:  Physical Exam   Ht 5' 2\" (1.575 m)   Wt 175 lb 12.8 oz (79.7 kg)   BMI 32.15 kg/m²                Patient gained 1.6lbs

## 2023-03-10 ENCOUNTER — OFFICE VISIT (OUTPATIENT)
Dept: BARIATRICS/WEIGHT MGMT | Age: 63
End: 2023-03-10

## 2023-03-10 VITALS — HEIGHT: 62 IN | BODY MASS INDEX: 32.06 KG/M2 | WEIGHT: 174.2 LBS

## 2023-03-10 DIAGNOSIS — E66.9 OBESITY (BMI 30.0-34.9): Primary | ICD-10-CM

## 2023-03-10 PROCEDURE — 99999 PR OFFICE/OUTPT VISIT,PROCEDURE ONLY: CPT | Performed by: SURGERY

## 2023-03-16 PROBLEM — C50.912 DUCTAL CARCINOMA IN SITU (DCIS) OF LEFT BREAST WITH MICROINVASIVE COMPONENT (HCC): Status: ACTIVE | Noted: 2023-03-16

## 2023-03-17 ENCOUNTER — OFFICE VISIT (OUTPATIENT)
Dept: BARIATRICS/WEIGHT MGMT | Age: 63
End: 2023-03-17

## 2023-03-17 VITALS — WEIGHT: 173.2 LBS | BODY MASS INDEX: 31.87 KG/M2 | HEIGHT: 62 IN

## 2023-03-17 DIAGNOSIS — E66.9 OBESITY (BMI 30.0-34.9): Primary | ICD-10-CM

## 2023-03-17 PROCEDURE — 99999 PR OFFICE/OUTPT VISIT,PROCEDURE ONLY: CPT | Performed by: SURGERY

## 2023-03-17 NOTE — PROGRESS NOTES
Outpatient[facility]utrition Counseling - Non-Surgical Weight Loss Program    REASON FOR VISIT:    Chief Complaint:    Chief Complaint   Patient presents with    Weight Management     Weigh In         OBJECTIVE:  Physical Exam   Ht 5' 2\" (1.575 m)   Wt 173 lb 3.2 oz (78.6 kg)   BMI 31.68 kg/m²                Patient lost 1.0lb

## 2023-03-24 ENCOUNTER — OFFICE VISIT (OUTPATIENT)
Dept: BARIATRICS/WEIGHT MGMT | Age: 63
End: 2023-03-24

## 2023-03-24 VITALS — BODY MASS INDEX: 31.5 KG/M2 | HEIGHT: 62 IN | WEIGHT: 171.2 LBS

## 2023-03-24 DIAGNOSIS — E66.9 OBESITY (BMI 30.0-34.9): Primary | ICD-10-CM

## 2023-03-24 NOTE — PROGRESS NOTES
Outpatient[facility]rition Counseling - Non-Surgical Weight Loss Program    REASON FOR VISIT:    Chief Complaint:    Chief Complaint   Patient presents with    Weight Management     Weigh in         OBJECTIVE:  Physical Exam   Ht 5' 2\" (1.575 m)   Wt 171 lb 3.2 oz (77.7 kg)   BMI 31.31 kg/m²        Patient lost 2 pounds.

## 2023-03-31 ENCOUNTER — OFFICE VISIT (OUTPATIENT)
Dept: BARIATRICS/WEIGHT MGMT | Age: 63
End: 2023-03-31

## 2023-03-31 VITALS — WEIGHT: 170.4 LBS | HEIGHT: 62 IN | BODY MASS INDEX: 31.36 KG/M2

## 2023-03-31 DIAGNOSIS — E66.9 OBESITY (BMI 30.0-34.9): Primary | ICD-10-CM

## 2023-03-31 NOTE — PROGRESS NOTES
[facility]patient[facility]rition Counseling - Non-Surgical Weight Loss Program    REASON FOR VISIT:    Chief Complaint:  No chief complaint on file. OBJECTIVE:  Physical Exam   There were no vitals taken for this visit.                Patient lost 0.8lbs

## 2023-04-07 ENCOUNTER — OFFICE VISIT (OUTPATIENT)
Dept: BARIATRICS/WEIGHT MGMT | Age: 63
End: 2023-04-07

## 2023-04-07 VITALS — BODY MASS INDEX: 31.54 KG/M2 | HEIGHT: 62 IN | WEIGHT: 171.4 LBS

## 2023-04-07 DIAGNOSIS — E66.9 OBESITY (BMI 30.0-34.9): Primary | ICD-10-CM

## 2023-04-07 NOTE — PROGRESS NOTES
[facility]patient[facility]rition Counseling - Non-Surgical Weight Loss Program    REASON FOR VISIT:    Chief Complaint:    Chief Complaint   Patient presents with    Weight Loss     Weigh In         OBJECTIVE:  Physical Exam   Ht 5' 2\" (1.575 m)   Wt 171 lb 6.4 oz (77.7 kg)   BMI 31.35 kg/m²                Patient gained 1.0lb

## 2023-05-01 ENCOUNTER — INITIAL CONSULT (OUTPATIENT)
Dept: ONCOLOGY | Age: 63
End: 2023-05-01
Payer: COMMERCIAL

## 2023-05-01 ENCOUNTER — HOSPITAL ENCOUNTER (OUTPATIENT)
Dept: INFUSION THERAPY | Age: 63
Discharge: HOME | End: 2023-05-01
Payer: COMMERCIAL

## 2023-05-01 VITALS
OXYGEN SATURATION: 97 % | HEART RATE: 75 BPM | DIASTOLIC BLOOD PRESSURE: 90 MMHG | HEIGHT: 62 IN | WEIGHT: 171.8 LBS | BODY MASS INDEX: 31.62 KG/M2 | TEMPERATURE: 98 F | SYSTOLIC BLOOD PRESSURE: 179 MMHG

## 2023-05-01 DIAGNOSIS — C50.912 DUCTAL CARCINOMA IN SITU (DCIS) OF LEFT BREAST WITH MICROINVASIVE COMPONENT (HCC): Primary | ICD-10-CM

## 2023-05-01 PROCEDURE — 99204 OFFICE O/P NEW MOD 45 MIN: CPT | Performed by: INTERNAL MEDICINE

## 2023-05-01 PROCEDURE — 99211 OFF/OP EST MAY X REQ PHY/QHP: CPT

## 2023-05-01 PROCEDURE — 3074F SYST BP LT 130 MM HG: CPT | Performed by: INTERNAL MEDICINE

## 2023-05-01 PROCEDURE — 3078F DIAST BP <80 MM HG: CPT | Performed by: INTERNAL MEDICINE

## 2023-05-01 RX ORDER — ALLOPURINOL 100 MG/1
TABLET ORAL
COMMUNITY
Start: 2023-03-30

## 2023-05-01 ASSESSMENT — PATIENT HEALTH QUESTIONNAIRE - PHQ9
SUM OF ALL RESPONSES TO PHQ QUESTIONS 1-9: 1
2. FEELING DOWN, DEPRESSED OR HOPELESS: 1
SUM OF ALL RESPONSES TO PHQ QUESTIONS 1-9: 1
1. LITTLE INTEREST OR PLEASURE IN DOING THINGS: 0
SUM OF ALL RESPONSES TO PHQ QUESTIONS 1-9: 1
SUM OF ALL RESPONSES TO PHQ QUESTIONS 1-9: 1
SUM OF ALL RESPONSES TO PHQ9 QUESTIONS 1 & 2: 1

## 2023-05-01 NOTE — PROGRESS NOTES
MA Rooming Questions  Patient: Renny Singh  MRN: [id number]    Date: 5/1/2023        NEW PATIENT     5. Did the patient have a depression screening completed today?  Yes    PHQ-9 Total Score: 1 (5/1/2023 10:36 AM)       PHQ-9 Given to (if applicable):               PHQ-9 Score (if applicable):                     [] Positive     []  Negative              Does question #9 need addressed (if applicable)                     [] Yes    []  No               Denice Glasgow CMA

## 2023-05-02 ENCOUNTER — CLINICAL DOCUMENTATION (OUTPATIENT)
Dept: ONCOLOGY | Age: 63
End: 2023-05-02

## 2023-05-02 NOTE — PROGRESS NOTES
Per Dr. Martina Bell request, patient added to next Breast Tumor Board scheduled for Wednesday, 5/10/23. E-mail sent to [email or web address] per department direction.

## 2023-05-03 LAB
AVERAGE GLUCOSE: 120
HBA1C MFR BLD: 5.8 %

## 2023-05-05 ENCOUNTER — OFFICE VISIT (OUTPATIENT)
Dept: BARIATRICS/WEIGHT MGMT | Age: 63
End: 2023-05-05

## 2023-05-05 VITALS — BODY MASS INDEX: 31.39 KG/M2 | HEIGHT: 62 IN | WEIGHT: 170.6 LBS

## 2023-05-05 DIAGNOSIS — E66.9 OBESITY (BMI 30.0-34.9): Primary | ICD-10-CM

## 2023-05-05 NOTE — PROGRESS NOTES
OutpatientNutrition Counseling - Non-Surgical Weight Loss Program    REASON FOR VISIT:    Chief Complaint:    Chief Complaint   Patient presents with    Weight Management     Weigh in         OBJECTIVE:  Physical Exam   Ht 5' 2\" (1.575 m)   Wt 170 lb 9.6 oz (77.4 kg)   BMI 31.20 kg/m²        Patient lost 0.8 pounds.

## 2023-05-12 ENCOUNTER — OFFICE VISIT (OUTPATIENT)
Dept: BARIATRICS/WEIGHT MGMT | Age: 63
End: 2023-05-12

## 2023-05-12 VITALS — HEIGHT: 62 IN | WEIGHT: 171.2 LBS | BODY MASS INDEX: 31.5 KG/M2

## 2023-05-12 DIAGNOSIS — E66.9 OBESITY (BMI 30.0-34.9): Primary | ICD-10-CM

## 2023-05-12 PROCEDURE — 99999 PR OFFICE/OUTPT VISIT,PROCEDURE ONLY: CPT

## 2023-05-12 NOTE — PROGRESS NOTES
OutpatientNutrition Counseling - Non-Surgical Weight Loss Program    REASON FOR VISIT:    Chief Complaint:    Chief Complaint   Patient presents with    Weight Management     Weigh In         OBJECTIVE:  Physical Exam   Ht 5' 2\" (1.575 m)   Wt 171 lb 3.2 oz (77.7 kg)   BMI 31.31 kg/m²                Patient gained 0.6lbs

## 2023-05-19 ENCOUNTER — OFFICE VISIT (OUTPATIENT)
Dept: BARIATRICS/WEIGHT MGMT | Age: 63
End: 2023-05-19

## 2023-05-19 VITALS — WEIGHT: 171.6 LBS | BODY MASS INDEX: 31.58 KG/M2 | HEIGHT: 62 IN

## 2023-05-19 DIAGNOSIS — E66.9 OBESITY (BMI 30.0-34.9): Primary | ICD-10-CM

## 2023-05-19 NOTE — PROGRESS NOTES
Outpatient[facility]utrition Counseling - Non-Surgical Weight Loss Program    REASON FOR VISIT:    Chief Complaint:    Chief Complaint   Patient presents with    Weight Management     Weigh in          OBJECTIVE:  Physical Exam   Ht 5' 2\" (1.575 m)   Wt 171 lb 9.6 oz (77.8 kg)   BMI 31.39 kg/m²        Pt gained 0.4 lb

## 2023-05-26 ENCOUNTER — OFFICE VISIT (OUTPATIENT)
Dept: BARIATRICS/WEIGHT MGMT | Age: 63
End: 2023-05-26

## 2023-05-26 VITALS — HEIGHT: 62 IN | BODY MASS INDEX: 31.47 KG/M2 | WEIGHT: 171 LBS

## 2023-05-26 DIAGNOSIS — E66.9 OBESITY (BMI 30.0-34.9): Primary | ICD-10-CM

## 2023-05-26 PROCEDURE — 99999 PR OFFICE/OUTPT VISIT,PROCEDURE ONLY: CPT | Performed by: SURGERY

## 2023-05-26 NOTE — PROGRESS NOTES
OutpatientNutrition Counseling - Non-Surgical Weight Loss Program    REASON FOR VISIT:    Chief Complaint:    Chief Complaint   Patient presents with    Weight Management     Weigh In         OBJECTIVE:  Physical Exam   Ht 5' 2\" (1.575 m)   Wt 171 lb (77.6 kg)   BMI 31.28 kg/m²                Patient lost 0.6lbs

## 2023-06-02 ENCOUNTER — OFFICE VISIT (OUTPATIENT)
Dept: BARIATRICS/WEIGHT MGMT | Age: 63
End: 2023-06-02

## 2023-06-02 VITALS — HEIGHT: 62 IN | WEIGHT: 170.6 LBS | BODY MASS INDEX: 31.39 KG/M2

## 2023-06-02 DIAGNOSIS — E66.9 OBESITY (BMI 30.0-34.9): Primary | ICD-10-CM

## 2023-06-02 PROCEDURE — 99999 PR OFFICE/OUTPT VISIT,PROCEDURE ONLY: CPT | Performed by: SURGERY

## 2023-06-02 NOTE — PROGRESS NOTES
OutpatientNutrition Counseling - Non-Surgical Weight Loss Program    REASON FOR VISIT:    Chief Complaint:    Chief Complaint   Patient presents with    Weight Management     Weigh in. OBJECTIVE:  Physical Exam   Ht 5' 2\" (1.575 m)   Wt 170 lb 9.6 oz (77.4 kg)   BMI 31.20 kg/m²      Patient lost 0.4 pounds.

## 2023-06-19 ENCOUNTER — OFFICE VISIT (OUTPATIENT)
Dept: ONCOLOGY | Age: 63
End: 2023-06-19
Payer: COMMERCIAL

## 2023-06-19 ENCOUNTER — HOSPITAL ENCOUNTER (OUTPATIENT)
Dept: INFUSION THERAPY | Age: 63
Discharge: HOME | End: 2023-06-19
Payer: COMMERCIAL

## 2023-06-19 VITALS
SYSTOLIC BLOOD PRESSURE: 151 MMHG | WEIGHT: 173.2 LBS | OXYGEN SATURATION: 98 % | RESPIRATION RATE: 16 BRPM | HEART RATE: 79 BPM | DIASTOLIC BLOOD PRESSURE: 73 MMHG | HEIGHT: 62 IN | TEMPERATURE: 97.8 F | BODY MASS INDEX: 31.87 KG/M2

## 2023-06-19 DIAGNOSIS — Z80.42 FAMILY HISTORY OF PROSTATE CANCER: Primary | ICD-10-CM

## 2023-06-19 DIAGNOSIS — Z71.83 ENCOUNTER FOR NONPROCREATIVE GENETIC COUNSELING: ICD-10-CM

## 2023-06-19 DIAGNOSIS — C50.912 DUCTAL CARCINOMA IN SITU (DCIS) OF LEFT BREAST WITH MICROINVASIVE COMPONENT (HCC): ICD-10-CM

## 2023-06-19 PROCEDURE — 3078F DIAST BP <80 MM HG: CPT | Performed by: NURSE PRACTITIONER

## 2023-06-19 PROCEDURE — 3077F SYST BP >= 140 MM HG: CPT | Performed by: NURSE PRACTITIONER

## 2023-06-19 PROCEDURE — 99211 OFF/OP EST MAY X REQ PHY/QHP: CPT

## 2023-06-19 PROCEDURE — 99215 OFFICE O/P EST HI 40 MIN: CPT | Performed by: NURSE PRACTITIONER

## 2023-06-19 NOTE — PROGRESS NOTES
MA Rooming Questions  Patient: Luis Varela  MRN: [id number]    Date: 6/19/2023        Pt here for Genetic Consult  5. Did the patient have a depression screening completed today?  No    No data recorded     PHQ-9 Given to (if applicable):               PHQ-9 Score (if applicable):                     [] Positive     []  Negative              Does question #9 need addressed (if applicable)                     [] Yes    []  No               Sridhar Alexander MA
increased risk for ovarian cancer  3) Possible recommendations for prophylactic hysterectomy for results which suggest an increased risk for endometrial cancer  4) Increased colon cancer surveillance by colonoscopy screening every 1-2 years beginning by age 18-19y      Lastly, we discussed that the results of Ms. Ansari's genetic testing may be beneficial in defining her risk for cancer as well as for her family members. SUMMARY & PLAN  1) Ms. Blake Amador meets the NCCN criteria for genetic testing based on her personal and family history. 2) Genetic testing via a multi-gene panel was recommended and offered to Ms. Ansari. 3) Ms. Ansari elected to proceed with the Multi-Cancer Hereditary Cancer Gene Panel. 4) Ms. Blake Amador is aware that she will receive a notification from the laboratory ([facility]) if the out of pocket cost for testing exceeds $250 (based on individual insurance plan) and the alternative option to proceed with the self pay price of $250.     5) Informed consent was obtained and a blood sample was sent to [facility]. We will call Ms. Ansari with results as soon as they are available. A follow up appointment may be recommended. A summary letter with results and final medical management recommendations will be sent once available. A total of 60 minutes were spent face to face with Ms. Ansari and 50% of the time was spent educating and counseling. The [address] [facility]  [employer] [facility] would be glad to offer our assistance should you have any questions or concerns about this information. Please feel free to contact us at [phone].         Fe Seaman, MSN, APRN  Advanced Practice Provider Genetics

## 2023-06-23 ENCOUNTER — OFFICE VISIT (OUTPATIENT)
Dept: BARIATRICS/WEIGHT MGMT | Age: 63
End: 2023-06-23

## 2023-06-23 VITALS — WEIGHT: 171.6 LBS | HEIGHT: 62 IN | BODY MASS INDEX: 31.58 KG/M2

## 2023-06-23 DIAGNOSIS — E66.9 OBESITY (BMI 30.0-34.9): Primary | ICD-10-CM

## 2023-06-23 NOTE — PROGRESS NOTES
OutpatientNutrition Counseling - Non-Surgical Weight Loss Program    REASON FOR VISIT:    Chief Complaint:    Chief Complaint   Patient presents with    Weight Management         OBJECTIVE:  Physical Exam   Ht 5' 2\" (1.575 m)   Wt 171 lb 9.6 oz (77.8 kg)   BMI 31.39 kg/m²                Patient lost 1.4lbs

## 2023-06-30 ENCOUNTER — OFFICE VISIT (OUTPATIENT)
Dept: BARIATRICS/WEIGHT MGMT | Age: 63
End: 2023-06-30

## 2023-06-30 VITALS — WEIGHT: 169.8 LBS | HEIGHT: 62 IN | BODY MASS INDEX: 31.25 KG/M2

## 2023-06-30 DIAGNOSIS — E66.9 OBESITY (BMI 30.0-34.9): Primary | ICD-10-CM

## 2023-07-07 ENCOUNTER — OFFICE VISIT (OUTPATIENT)
Dept: BARIATRICS/WEIGHT MGMT | Age: 63
End: 2023-07-07

## 2023-07-07 VITALS — HEIGHT: 62 IN | WEIGHT: 170.6 LBS | BODY MASS INDEX: 31.39 KG/M2

## 2023-07-07 DIAGNOSIS — E66.9 OBESITY (BMI 30.0-34.9): Primary | ICD-10-CM

## 2023-07-14 ENCOUNTER — OFFICE VISIT (OUTPATIENT)
Dept: BARIATRICS/WEIGHT MGMT | Age: 63
End: 2023-07-14

## 2023-07-14 VITALS — HEIGHT: 62 IN | BODY MASS INDEX: 31.76 KG/M2 | WEIGHT: 172.6 LBS

## 2023-07-14 DIAGNOSIS — E66.9 OBESITY (BMI 30.0-34.9): Primary | ICD-10-CM

## 2023-07-17 ENCOUNTER — TELEPHONE (OUTPATIENT)
Dept: ONCOLOGY | Age: 63
End: 2023-07-17

## 2023-07-21 ENCOUNTER — OFFICE VISIT (OUTPATIENT)
Dept: BARIATRICS/WEIGHT MGMT | Age: 63
End: 2023-07-21

## 2023-07-21 ENCOUNTER — CLINICAL DOCUMENTATION (OUTPATIENT)
Dept: ONCOLOGY | Age: 63
End: 2023-07-21

## 2023-07-21 VITALS — WEIGHT: 170.6 LBS | BODY MASS INDEX: 31.39 KG/M2 | HEIGHT: 62 IN

## 2023-07-21 DIAGNOSIS — E66.9 OBESITY (BMI 30.0-34.9): Primary | ICD-10-CM

## 2023-07-21 NOTE — PROGRESS NOTES
Genetics consult visit note faxed to [place]/Medical Records @ [phone] and [phone]. Confirmation rec'd.

## 2023-07-28 ENCOUNTER — OFFICE VISIT (OUTPATIENT)
Dept: BARIATRICS/WEIGHT MGMT | Age: 63
End: 2023-07-28

## 2023-07-28 VITALS — BODY MASS INDEX: 31.5 KG/M2 | WEIGHT: 171.2 LBS | HEIGHT: 62 IN

## 2023-07-28 DIAGNOSIS — E66.9 OBESITY (BMI 30.0-34.9): Primary | ICD-10-CM

## 2023-08-04 ENCOUNTER — OFFICE VISIT (OUTPATIENT)
Dept: BARIATRICS/WEIGHT MGMT | Age: 63
End: 2023-08-04

## 2023-08-04 ENCOUNTER — HOSPITAL ENCOUNTER (OUTPATIENT)
Dept: INFUSION THERAPY | Age: 63
Discharge: HOME | End: 2023-08-04
Payer: COMMERCIAL

## 2023-08-04 VITALS — BODY MASS INDEX: 31.8 KG/M2 | HEIGHT: 62 IN | WEIGHT: 172.8 LBS

## 2023-08-04 DIAGNOSIS — C50.912 DUCTAL CARCINOMA IN SITU (DCIS) OF LEFT BREAST WITH MICROINVASIVE COMPONENT (HCC): ICD-10-CM

## 2023-08-04 DIAGNOSIS — E66.9 OBESITY (BMI 30.0-34.9): Primary | ICD-10-CM

## 2023-08-04 LAB
ALBUMIN SERPL-MCNC: 4.6 GM/DL (ref 3.4–5)
ALP BLD-CCNC: 93 IU/L (ref 40–128)
ALT SERPL-CCNC: 28 U/L (ref 10–40)
ANION GAP SERPL CALCULATED.3IONS-SCNC: 11 MMOL/L (ref 4–16)
AST SERPL-CCNC: 23 IU/L (ref 15–37)
BASOPHILS ABSOLUTE: 0 K/CU MM
BASOPHILS RELATIVE PERCENT: 0.3 % (ref 0–1)
BILIRUB SERPL-MCNC: 0.4 MG/DL (ref 0–1)
BUN SERPL-MCNC: 14 MG/DL (ref 6–23)
CALCIUM SERPL-MCNC: 9.6 MG/DL (ref 8.3–10.6)
CHLORIDE BLD-SCNC: 102 MMOL/L (ref 99–110)
CO2: 26 MMOL/L (ref 21–32)
CREAT SERPL-MCNC: 0.5 MG/DL (ref 0.6–1.1)
DIFFERENTIAL TYPE: ABNORMAL
EOSINOPHILS ABSOLUTE: 0.2 K/CU MM
EOSINOPHILS RELATIVE PERCENT: 2.2 % (ref 0–3)
GFR SERPL CREATININE-BSD FRML MDRD: >60 ML/MIN/1.73M2
GLUCOSE SERPL-MCNC: 107 MG/DL (ref 70–99)
HCT VFR BLD CALC: 41.1 % (ref 37–47)
HEMOGLOBIN: 13.9 GM/DL (ref 12.5–16)
LYMPHOCYTES ABSOLUTE: 2.9 K/CU MM
LYMPHOCYTES RELATIVE PERCENT: 37.1 % (ref 24–44)
MCH RBC QN AUTO: 28.8 PG (ref 27–31)
MCHC RBC AUTO-ENTMCNC: 33.8 % (ref 32–36)
MCV RBC AUTO: 85.1 FL (ref 78–100)
MONOCYTES ABSOLUTE: 0.6 K/CU MM
MONOCYTES RELATIVE PERCENT: 8 % (ref 0–4)
PDW BLD-RTO: 14.6 % (ref 11.7–14.9)
PLATELET # BLD: 233 K/CU MM (ref 140–440)
PMV BLD AUTO: 9.9 FL (ref 7.5–11.1)
POTASSIUM SERPL-SCNC: 4.4 MMOL/L (ref 3.5–5.1)
RBC # BLD: 4.83 M/CU MM (ref 4.2–5.4)
SEGMENTED NEUTROPHILS ABSOLUTE COUNT: 4.1 K/CU MM
SEGMENTED NEUTROPHILS RELATIVE PERCENT: 52.4 % (ref 36–66)
SODIUM BLD-SCNC: 139 MMOL/L (ref 135–145)
TOTAL PROTEIN: 6.8 GM/DL (ref 6.4–8.2)
WBC # BLD: 7.8 K/CU MM (ref 4–10.5)

## 2023-08-04 PROCEDURE — 36415 COLL VENOUS BLD VENIPUNCTURE: CPT

## 2023-08-04 PROCEDURE — 85025 COMPLETE CBC W/AUTO DIFF WBC: CPT

## 2023-08-04 PROCEDURE — 86300 IMMUNOASSAY TUMOR CA 15-3: CPT

## 2023-08-04 PROCEDURE — 80053 COMPREHEN METABOLIC PANEL: CPT

## 2023-08-06 LAB — CANCER AG27-29 SERPL-ACNC: 30.3 U/ML

## 2023-08-10 NOTE — PROGRESS NOTES
Patient Name:  Dayanara Villanueva  Patient :  1960  Patient MRN:  [id number]     Primary Oncologist: Marvie Kehr, MD  Referring Provider: Alfreda Rodriguez MD     Date of Service: 2023      Reason for Consult:  Left Breast DCIS      Chief Complaint:    Chief Complaint   Patient presents with    Follow-up       Encounter Diagnosis   Name Primary? 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 Found to have suspicious calcifications in the left breast on routine mammogram.  She did not report any palpable breast mass. Biopsy revealed estrogen receptor negative progesterone receptor negative, ductal carcinoma in situ with microinvasion. She then subsequently underwent left mastectomy with sentinel node removal.  Pathology with DCIS, no invasive cancer identified. 1 lymph node was negative. Grade and Ki-67 could not be evaluated. No CP, No SOB. No ABD Pain. Reports Left Rib pain. Pain at Left mastectomy site. No new masses. No LAD. No Fevers. No Headaches. No Nausea or Diarrhea. No Constipation or Diarrhea. 23 [facility]  Right Breast Negative. In the lower outer quadrant of the left breast at approx 6-7 o'clock , there are indeterminate calcifications. 23 Left Breast Mammogram  Birads 4-S Stereotactic biopsy recommended of central to slightly lower outer left breast calcifications.     3/3/23 Underwent Left Breast Biopsy         3/15/23 BMP WNL  CBC WBC 10.5, Hemoglobin 14.8, Hematocrit 43.3, MCV 82.5, Platelets 394     5/15/81 Underwent Left Total Mastectomy with SN Lymph Node biopsy with Dr Bhavin Maria      23 [facility]      23 WBC 7.8, HGB 13.9, HCT 41.1, MCV 85.1, Platelets 002, ANC 0994  CMP Creatinine 0.5  CA 27.29: 30.3    Past Medical History:     HTN  Allergies  Heart Disease- SVT  Arthritis  Situational depression  IBS-D

## 2023-08-11 ENCOUNTER — OFFICE VISIT (OUTPATIENT)
Dept: BARIATRICS/WEIGHT MGMT | Age: 63
End: 2023-08-11

## 2023-08-11 ENCOUNTER — OFFICE VISIT (OUTPATIENT)
Dept: ONCOLOGY | Age: 63
End: 2023-08-11
Payer: MEDICARE

## 2023-08-11 ENCOUNTER — HOSPITAL ENCOUNTER (OUTPATIENT)
Dept: INFUSION THERAPY | Age: 63
Discharge: HOME | End: 2023-08-11
Payer: COMMERCIAL

## 2023-08-11 VITALS — WEIGHT: 174.2 LBS | HEIGHT: 62 IN | BODY MASS INDEX: 32.06 KG/M2

## 2023-08-11 VITALS
WEIGHT: 173.8 LBS | HEART RATE: 65 BPM | SYSTOLIC BLOOD PRESSURE: 168 MMHG | DIASTOLIC BLOOD PRESSURE: 81 MMHG | OXYGEN SATURATION: 98 % | TEMPERATURE: 98.1 F | HEIGHT: 62 IN | BODY MASS INDEX: 31.98 KG/M2 | RESPIRATION RATE: 16 BRPM

## 2023-08-11 DIAGNOSIS — C50.912 DUCTAL CARCINOMA IN SITU (DCIS) OF LEFT BREAST WITH MICROINVASIVE COMPONENT (HCC): Primary | ICD-10-CM

## 2023-08-11 DIAGNOSIS — E66.9 OBESITY (BMI 30.0-34.9): Primary | ICD-10-CM

## 2023-08-11 PROCEDURE — 3078F DIAST BP <80 MM HG: CPT | Performed by: INTERNAL MEDICINE

## 2023-08-11 PROCEDURE — G8417 CALC BMI ABV UP PARAM F/U: HCPCS | Performed by: INTERNAL MEDICINE

## 2023-08-11 PROCEDURE — 3017F COLORECTAL CA SCREEN DOC REV: CPT | Performed by: INTERNAL MEDICINE

## 2023-08-11 PROCEDURE — 1123F ACP DISCUSS/DSCN MKR DOCD: CPT | Performed by: INTERNAL MEDICINE

## 2023-08-11 PROCEDURE — 1090F PRES/ABSN URINE INCON ASSESS: CPT | Performed by: INTERNAL MEDICINE

## 2023-08-11 PROCEDURE — 1036F TOBACCO NON-USER: CPT | Performed by: INTERNAL MEDICINE

## 2023-08-11 PROCEDURE — 99211 OFF/OP EST MAY X REQ PHY/QHP: CPT

## 2023-08-11 PROCEDURE — G8427 DOCREV CUR MEDS BY ELIG CLIN: HCPCS | Performed by: INTERNAL MEDICINE

## 2023-08-11 PROCEDURE — 99213 OFFICE O/P EST LOW 20 MIN: CPT | Performed by: INTERNAL MEDICINE

## 2023-08-11 PROCEDURE — G8399 PT W/DXA RESULTS DOCUMENT: HCPCS | Performed by: INTERNAL MEDICINE

## 2023-08-11 PROCEDURE — 3074F SYST BP LT 130 MM HG: CPT | Performed by: INTERNAL MEDICINE

## 2023-08-11 NOTE — PROGRESS NOTES
MA Rooming Questions  Patient: Eli Chan  MRN: [id number]    Date: 8/11/2023        1. Do you have any new issues? yes tired, anexity and rt side          2. Do you need any refills on medications?    no    3. Have you had any imaging done since your last visit?   no    4. Have you been hospitalized or seen in the emergency room since your last visit here?   no    5. Did the patient have a depression screening completed today?  No    No data recorded     PHQ-9 Given to (if applicable):               PHQ-9 Score (if applicable):                     [] Positive     []  Negative              Does question #9 need addressed (if applicable)                     [] Yes    []  No               Arianna Barton LPN

## 2023-08-15 ENCOUNTER — TELEPHONE (OUTPATIENT)
Dept: ONCOLOGY | Age: 63
End: 2023-08-15

## 2023-08-15 LAB
AVERAGE GLUCOSE: 117
HBA1C MFR BLD: 5.7 %

## 2023-08-15 NOTE — TELEPHONE ENCOUNTER
Patient called and stated she was to have a u/s of the right breast per office visit 08/11/23 at [facility]. I do not see an order placed, please review and place order for [facility].

## 2023-08-17 DIAGNOSIS — N64.4 BREAST PAIN, RIGHT: ICD-10-CM

## 2023-08-17 DIAGNOSIS — C50.912 DUCTAL CARCINOMA IN SITU (DCIS) OF LEFT BREAST WITH MICROINVASIVE COMPONENT (HCC): Primary | ICD-10-CM

## 2023-08-17 NOTE — PROGRESS NOTES
Patient was here to see Dr. Sy Kaiser on 8/11/23. Patient reported tenderness under her right breast.  Per Dr. Sy Kaiser - order placed for right breast dx mammogram/US to be scheduled at [facility]. Order faxed to [phone]. They will call and get patient scheduled.

## 2023-08-18 ENCOUNTER — OFFICE VISIT (OUTPATIENT)
Dept: BARIATRICS/WEIGHT MGMT | Age: 63
End: 2023-08-18

## 2023-08-18 ENCOUNTER — CLINICAL DOCUMENTATION (OUTPATIENT)
Dept: ONCOLOGY | Age: 63
End: 2023-08-18

## 2023-08-18 VITALS — BODY MASS INDEX: 31.87 KG/M2 | WEIGHT: 173.2 LBS | HEIGHT: 62 IN

## 2023-08-18 DIAGNOSIS — E66.9 OBESITY (BMI 30.0-34.9): Primary | ICD-10-CM

## 2023-08-25 ENCOUNTER — OFFICE VISIT (OUTPATIENT)
Dept: BARIATRICS/WEIGHT MGMT | Age: 63
End: 2023-08-25

## 2023-08-25 VITALS — BODY MASS INDEX: 31.73 KG/M2 | WEIGHT: 172.4 LBS | HEIGHT: 62 IN

## 2023-08-25 DIAGNOSIS — E66.9 OBESITY (BMI 30.0-34.9): Primary | ICD-10-CM

## 2023-08-25 PROBLEM — E66.811 OBESITY (BMI 30.0-34.9): Status: ACTIVE | Noted: 2023-08-25

## 2023-09-01 ENCOUNTER — OFFICE VISIT (OUTPATIENT)
Dept: BARIATRICS/WEIGHT MGMT | Age: 63
End: 2023-09-01

## 2023-09-01 VITALS — BODY MASS INDEX: 31.83 KG/M2 | WEIGHT: 173 LBS | HEIGHT: 62 IN

## 2023-09-01 DIAGNOSIS — E66.9 OBESITY (BMI 30.0-34.9): Primary | ICD-10-CM

## 2023-09-19 ENCOUNTER — HOSPITAL ENCOUNTER (OUTPATIENT)
Dept: INFUSION THERAPY | Age: 63
Discharge: HOME | End: 2023-09-19
Payer: COMMERCIAL

## 2023-09-19 ENCOUNTER — OFFICE VISIT (OUTPATIENT)
Dept: ONCOLOGY | Age: 63
End: 2023-09-19
Payer: MEDICARE

## 2023-09-19 VITALS
BODY MASS INDEX: 32.76 KG/M2 | HEIGHT: 62 IN | SYSTOLIC BLOOD PRESSURE: 150 MMHG | RESPIRATION RATE: 16 BRPM | WEIGHT: 178 LBS | TEMPERATURE: 97.8 F | HEART RATE: 68 BPM | OXYGEN SATURATION: 96 % | DIASTOLIC BLOOD PRESSURE: 82 MMHG

## 2023-09-19 DIAGNOSIS — C50.912 DUCTAL CARCINOMA IN SITU (DCIS) OF LEFT BREAST WITH MICROINVASIVE COMPONENT (HCC): Primary | ICD-10-CM

## 2023-09-19 DIAGNOSIS — Z71.89 ADVANCE DIRECTIVE DISCUSSED WITH PATIENT: ICD-10-CM

## 2023-09-19 PROCEDURE — 99215 OFFICE O/P EST HI 40 MIN: CPT | Performed by: NURSE PRACTITIONER

## 2023-09-19 PROCEDURE — 3077F SYST BP >= 140 MM HG: CPT | Performed by: NURSE PRACTITIONER

## 2023-09-19 PROCEDURE — G8417 CALC BMI ABV UP PARAM F/U: HCPCS | Performed by: NURSE PRACTITIONER

## 2023-09-19 PROCEDURE — 1036F TOBACCO NON-USER: CPT | Performed by: NURSE PRACTITIONER

## 2023-09-19 PROCEDURE — G8399 PT W/DXA RESULTS DOCUMENT: HCPCS | Performed by: NURSE PRACTITIONER

## 2023-09-19 PROCEDURE — G8427 DOCREV CUR MEDS BY ELIG CLIN: HCPCS | Performed by: NURSE PRACTITIONER

## 2023-09-19 PROCEDURE — 1090F PRES/ABSN URINE INCON ASSESS: CPT | Performed by: NURSE PRACTITIONER

## 2023-09-19 PROCEDURE — 1123F ACP DISCUSS/DSCN MKR DOCD: CPT | Performed by: NURSE PRACTITIONER

## 2023-09-19 PROCEDURE — 3079F DIAST BP 80-89 MM HG: CPT | Performed by: NURSE PRACTITIONER

## 2023-09-19 PROCEDURE — 3017F COLORECTAL CA SCREEN DOC REV: CPT | Performed by: NURSE PRACTITIONER

## 2023-09-19 PROCEDURE — 99211 OFF/OP EST MAY X REQ PHY/QHP: CPT

## 2023-09-19 NOTE — PROGRESS NOTES
MA Rooming Questions  Patient: Cristo Johnson  MRN: [id number]    Date: 9/19/2023        Survivorship    Baldemar Gonzalez CMA
a mastectomy and had your breast reconstructed, you may get harmless lumps caused by a build-up of scar tissue or dead fat cells in the reconstructed breast. These types of lumps aren't cancer. Still, your doctor needs to know about any lumps you feel in your breast so they can be monitored for any change in size or tenderness. Since mastectomy and reconstruction usually removes all of the breast tissue and replaces it with other tissue and/or an implant, mammogram is not usually recommended for reconstructed breasts. Your doctor can monitor any new lumps on a reconstructed breast by performing a clinical breast exam. He or she may also recommend additional screening methods such as MRI. Regional Recurrence:  A regional breast cancer recurrence means the cancer has come back in the nearby lymph nodes. Signs and symptoms of regional recurrence may include:  -a lump or swelling in the lymph nodes under the arm, above the collarbone, near the breastbone, or in your neck   -swelling in the arm on the same side where the breast cancer was first found   -constant pain in the arm and shoulder   -loss of feeling in the arm and shoulder  -constant pain in the chest   -problems swallowing    Distant Recurrence:  A distant (metastatic) recurrence means the cancer has traveled to distant parts of the body, most commonly the bones, liver, lungs, and brain. Signs and symptoms of distant recurrence may include:  -persistent and worsening pain, such as chest or bone pain  -numbness or weakness anywhere in the body  -persistent cough  -difficulty breathing  -loss of appetite  -constant nausea  -weight loss  -severe headaches  -seizures  -vision problems  -loss of balance  -confusion    Contact your medical/radiation oncologists, surgeon, or PCP if you need assistance in these areas of concern or if you experience any new, unusual, or persistent symptoms.        [employer]

## 2023-10-04 ENCOUNTER — CLINICAL DOCUMENTATION (OUTPATIENT)
Dept: RADIATION ONCOLOGY | Age: 63
End: 2023-10-04

## 2023-10-04 NOTE — CARE COORDINATION
Pt referred to [facility] for supportive counseling services.

## 2023-11-20 ENCOUNTER — COMMUNITY OUTREACH (OUTPATIENT)
Dept: INTERNAL MEDICINE CLINIC | Age: 63
End: 2023-11-20

## 2023-11-21 ENCOUNTER — COMMUNITY OUTREACH (OUTPATIENT)
Dept: INTERNAL MEDICINE CLINIC | Age: 63
End: 2023-11-21

## 2023-12-11 ENCOUNTER — HOSPITAL ENCOUNTER (OUTPATIENT)
Dept: INFUSION THERAPY | Age: 63
Discharge: HOME | End: 2023-12-11
Payer: MEDICARE

## 2023-12-11 DIAGNOSIS — C50.912 DUCTAL CARCINOMA IN SITU (DCIS) OF LEFT BREAST WITH MICROINVASIVE COMPONENT (HCC): ICD-10-CM

## 2023-12-11 LAB
ALBUMIN SERPL-MCNC: 4.9 GM/DL (ref 3.4–5)
ALP BLD-CCNC: 91 IU/L (ref 40–129)
ALT SERPL-CCNC: 34 U/L (ref 10–40)
ANION GAP SERPL CALCULATED.3IONS-SCNC: 17 MMOL/L (ref 4–16)
AST SERPL-CCNC: 26 IU/L (ref 15–37)
BASOPHILS ABSOLUTE: 0 K/CU MM
BASOPHILS RELATIVE PERCENT: 0.1 % (ref 0–1)
BILIRUB SERPL-MCNC: 0.5 MG/DL (ref 0–1)
BUN SERPL-MCNC: 10 MG/DL (ref 6–23)
CALCIUM SERPL-MCNC: 10 MG/DL (ref 8.3–10.6)
CHLORIDE BLD-SCNC: 103 MMOL/L (ref 99–110)
CO2: 25 MMOL/L (ref 21–32)
CREAT SERPL-MCNC: 0.6 MG/DL (ref 0.6–1.1)
DIFFERENTIAL TYPE: ABNORMAL
EOSINOPHILS ABSOLUTE: 0.1 K/CU MM
EOSINOPHILS RELATIVE PERCENT: 1.7 % (ref 0–3)
GFR SERPL CREATININE-BSD FRML MDRD: >60 ML/MIN/1.73M2
GLUCOSE SERPL-MCNC: 102 MG/DL (ref 70–99)
HCT VFR BLD CALC: 42.6 % (ref 37–47)
HEMOGLOBIN: 14.3 GM/DL (ref 12.5–16)
LYMPHOCYTES ABSOLUTE: 2.3 K/CU MM
LYMPHOCYTES RELATIVE PERCENT: 31 % (ref 24–44)
MCH RBC QN AUTO: 28.6 PG (ref 27–31)
MCHC RBC AUTO-ENTMCNC: 33.6 % (ref 32–36)
MCV RBC AUTO: 85.2 FL (ref 78–100)
MONOCYTES ABSOLUTE: 0.5 K/CU MM
MONOCYTES RELATIVE PERCENT: 7.1 % (ref 0–4)
PDW BLD-RTO: 14.5 % (ref 11.7–14.9)
PLATELET # BLD: 247 K/CU MM (ref 140–440)
PMV BLD AUTO: 9.7 FL (ref 7.5–11.1)
POTASSIUM SERPL-SCNC: 4.2 MMOL/L (ref 3.5–5.1)
RBC # BLD: 5 M/CU MM (ref 4.2–5.4)
SEGMENTED NEUTROPHILS ABSOLUTE COUNT: 4.5 K/CU MM
SEGMENTED NEUTROPHILS RELATIVE PERCENT: 60.1 % (ref 36–66)
SODIUM BLD-SCNC: 145 MMOL/L (ref 135–145)
TOTAL PROTEIN: 7.6 GM/DL (ref 6.4–8.2)
WBC # BLD: 7.5 K/CU MM (ref 4–10.5)

## 2023-12-11 PROCEDURE — 36415 COLL VENOUS BLD VENIPUNCTURE: CPT

## 2023-12-11 PROCEDURE — 85025 COMPLETE CBC W/AUTO DIFF WBC: CPT

## 2023-12-11 PROCEDURE — 86300 IMMUNOASSAY TUMOR CA 15-3: CPT

## 2023-12-11 PROCEDURE — 80053 COMPREHEN METABOLIC PANEL: CPT

## 2023-12-13 LAB — CANCER AG27-29 SERPL-ACNC: 35.3 U/ML

## 2023-12-18 ENCOUNTER — HOSPITAL ENCOUNTER (OUTPATIENT)
Dept: INFUSION THERAPY | Age: 63
Discharge: HOME | End: 2023-12-18
Payer: MEDICARE

## 2023-12-18 PROCEDURE — 99211 OFF/OP EST MAY X REQ PHY/QHP: CPT

## 2023-12-20 ENCOUNTER — TELEPHONE (OUTPATIENT)
Dept: ONCOLOGY | Age: 63
End: 2023-12-20

## 2023-12-20 NOTE — TELEPHONE ENCOUNTER
Pt would like clarification on the reason/type of mammogram ordered as the associated dx states \"erythema\".

## 2024-07-22 ENCOUNTER — HOSPITAL ENCOUNTER (OUTPATIENT)
Dept: INFUSION THERAPY | Age: 64
Discharge: HOME | End: 2024-07-22
Payer: MEDICARE

## 2024-07-22 DIAGNOSIS — C50.912 DUCTAL CARCINOMA IN SITU (DCIS) OF LEFT BREAST WITH MICROINVASIVE COMPONENT (HCC): ICD-10-CM

## 2024-07-22 LAB
ALBUMIN SERPL-MCNC: 4.6 GM/DL (ref 3.4–5)
ALP BLD-CCNC: 89 IU/L (ref 40–129)
ALT SERPL-CCNC: 32 U/L (ref 10–40)
ANION GAP SERPL CALCULATED.3IONS-SCNC: 15 MMOL/L (ref 7–16)
AST SERPL-CCNC: 26 IU/L (ref 15–37)
BASOPHILS ABSOLUTE: 0 K/CU MM
BASOPHILS RELATIVE PERCENT: 0.3 % (ref 0–1)
BILIRUB SERPL-MCNC: 0.2 MG/DL (ref 0–1)
BUN SERPL-MCNC: 14 MG/DL (ref 6–23)
CALCIUM SERPL-MCNC: 10.1 MG/DL (ref 8.3–10.6)
CHLORIDE BLD-SCNC: 102 MMOL/L (ref 99–110)
CO2: 24 MMOL/L (ref 21–32)
CREAT SERPL-MCNC: 0.6 MG/DL (ref 0.6–1.1)
DIFFERENTIAL TYPE: ABNORMAL
EOSINOPHILS ABSOLUTE: 0.2 K/CU MM
EOSINOPHILS RELATIVE PERCENT: 3.3 % (ref 0–3)
GFR, ESTIMATED: >90 ML/MIN/1.73M2
GLUCOSE SERPL-MCNC: 151 MG/DL (ref 70–99)
HCT VFR BLD CALC: 39.5 % (ref 37–47)
HEMOGLOBIN: 13.6 GM/DL (ref 12.5–16)
LYMPHOCYTES ABSOLUTE: 2.6 K/CU MM
LYMPHOCYTES RELATIVE PERCENT: 34.6 % (ref 24–44)
MCH RBC QN AUTO: 29.3 PG (ref 27–31)
MCHC RBC AUTO-ENTMCNC: 34.4 % (ref 32–36)
MCV RBC AUTO: 85.1 FL (ref 78–100)
MONOCYTES ABSOLUTE: 0.6 K/CU MM
MONOCYTES RELATIVE PERCENT: 8 % (ref 0–4)
NEUTROPHILS ABSOLUTE: 4 K/CU MM
NEUTROPHILS RELATIVE PERCENT: 53.8 % (ref 36–66)
PDW BLD-RTO: 14.3 % (ref 11.7–14.9)
PLATELET # BLD: 270 K/CU MM (ref 140–440)
PMV BLD AUTO: 10 FL (ref 7.5–11.1)
POTASSIUM SERPL-SCNC: 4 MMOL/L (ref 3.5–5.1)
RBC # BLD: 4.64 M/CU MM (ref 4.2–5.4)
SODIUM BLD-SCNC: 141 MMOL/L (ref 135–145)
TOTAL PROTEIN: 7.1 GM/DL (ref 6.4–8.2)
WBC # BLD: 7.4 K/CU MM (ref 4–10.5)

## 2024-07-22 PROCEDURE — 85025 COMPLETE CBC W/AUTO DIFF WBC: CPT

## 2024-07-22 PROCEDURE — 80053 COMPREHEN METABOLIC PANEL: CPT

## 2024-07-22 PROCEDURE — 36415 COLL VENOUS BLD VENIPUNCTURE: CPT

## 2024-07-22 PROCEDURE — 86300 IMMUNOASSAY TUMOR CA 15-3: CPT

## 2024-07-24 ENCOUNTER — HOSPITAL ENCOUNTER (OUTPATIENT)
Dept: INFUSION THERAPY | Age: 64
Discharge: HOME | End: 2024-07-24
Payer: MEDICARE

## 2024-07-24 ENCOUNTER — OFFICE VISIT (OUTPATIENT)
Dept: ONCOLOGY | Age: 64
End: 2024-07-24
Payer: MEDICARE

## 2024-07-24 VITALS
BODY MASS INDEX: 33.68 KG/M2 | SYSTOLIC BLOOD PRESSURE: 173 MMHG | WEIGHT: 183 LBS | TEMPERATURE: 98.1 F | HEART RATE: 82 BPM | HEIGHT: 62 IN | DIASTOLIC BLOOD PRESSURE: 80 MMHG | OXYGEN SATURATION: 96 %

## 2024-07-24 DIAGNOSIS — Z71.89 ADVANCE DIRECTIVE DISCUSSED WITH PATIENT: ICD-10-CM

## 2024-07-24 DIAGNOSIS — C50.912 DUCTAL CARCINOMA IN SITU (DCIS) OF LEFT BREAST WITH MICROINVASIVE COMPONENT (HCC): Primary | ICD-10-CM

## 2024-07-24 DIAGNOSIS — Z80.42 FAMILY HISTORY OF PROSTATE CANCER: ICD-10-CM

## 2024-07-24 LAB — CANCER AG27-29 SERPL-ACNC: 17.4 U/ML

## 2024-07-24 PROCEDURE — 99214 OFFICE O/P EST MOD 30 MIN: CPT | Performed by: INTERNAL MEDICINE

## 2024-07-24 PROCEDURE — G8399 PT W/DXA RESULTS DOCUMENT: HCPCS | Performed by: INTERNAL MEDICINE

## 2024-07-24 PROCEDURE — 3079F DIAST BP 80-89 MM HG: CPT | Performed by: INTERNAL MEDICINE

## 2024-07-24 PROCEDURE — 99211 OFF/OP EST MAY X REQ PHY/QHP: CPT

## 2024-07-24 PROCEDURE — 1036F TOBACCO NON-USER: CPT | Performed by: INTERNAL MEDICINE

## 2024-07-24 PROCEDURE — G8427 DOCREV CUR MEDS BY ELIG CLIN: HCPCS | Performed by: INTERNAL MEDICINE

## 2024-07-24 PROCEDURE — 1123F ACP DISCUSS/DSCN MKR DOCD: CPT | Performed by: INTERNAL MEDICINE

## 2024-07-24 PROCEDURE — 1090F PRES/ABSN URINE INCON ASSESS: CPT | Performed by: INTERNAL MEDICINE

## 2024-07-24 PROCEDURE — 3077F SYST BP >= 140 MM HG: CPT | Performed by: INTERNAL MEDICINE

## 2024-07-24 PROCEDURE — G8417 CALC BMI ABV UP PARAM F/U: HCPCS | Performed by: INTERNAL MEDICINE

## 2024-07-24 PROCEDURE — 3017F COLORECTAL CA SCREEN DOC REV: CPT | Performed by: INTERNAL MEDICINE

## 2024-07-24 RX ORDER — FENOFIBRIC ACID 135 MG/1
CAPSULE, DELAYED RELEASE ORAL
COMMUNITY
Start: 2024-07-19

## 2024-07-24 NOTE — PROGRESS NOTES
MA Rooming Questions  Patient: Adele Ansari  MRN: [id number]    Date: 7/24/2024        1. Do you have any new issues?   no         2. Do you need any refills on medications?    no    3. Have you had any imaging done since your last visit?   no    4. Have you been hospitalized or seen in the emergency room since your last visit here?   yes - mastectomy 6/7    5. Did the patient have a depression screening completed today? Yes    PHQ-9 Total Score: 0 (7/24/2024  1:49 PM)       PHQ-9 Given to (if applicable):               PHQ-9 Score (if applicable):                     [] Positive     []  Negative              Does question #9 need addressed (if applicable)                     [] Yes    []  No               Nora Kaiser CMA      
every mistake has been identified and corrected by editing.    Time Spent with patient for face to face, exam, education, discussing treatment options, reviewing imaging, reviewing labs, decision making, Pre-charting, and documenting today's visit, >30  mins.     [facility]

## 2024-11-14 ENCOUNTER — HOSPITAL ENCOUNTER (OUTPATIENT)
Dept: INFUSION THERAPY | Age: 64
Discharge: HOME | End: 2024-11-14
Payer: MEDICARE

## 2024-11-14 DIAGNOSIS — Z71.89 ADVANCE DIRECTIVE DISCUSSED WITH PATIENT: ICD-10-CM

## 2024-11-14 DIAGNOSIS — Z80.42 FAMILY HISTORY OF PROSTATE CANCER: ICD-10-CM

## 2024-11-14 DIAGNOSIS — C50.912 DUCTAL CARCINOMA IN SITU (DCIS) OF LEFT BREAST WITH MICROINVASIVE COMPONENT (HCC): ICD-10-CM

## 2024-11-14 LAB
ALBUMIN SERPL-MCNC: 4.5 G/DL (ref 3.4–5)
ALBUMIN/GLOB SERPL: 1.9 {RATIO} (ref 1.1–2.2)
ALP SERPL-CCNC: 69 U/L (ref 40–129)
ALT SERPL-CCNC: 27 U/L (ref 10–40)
ANION GAP SERPL CALCULATED.3IONS-SCNC: 12 MMOL/L (ref 9–17)
AST SERPL-CCNC: 23 U/L (ref 15–37)
BASOPHILS # BLD: 0.02 K/UL
BASOPHILS NFR BLD: 0 % (ref 0–1)
BILIRUB SERPL-MCNC: 0.3 MG/DL (ref 0–1)
BUN SERPL-MCNC: 13 MG/DL (ref 7–20)
CALCIUM SERPL-MCNC: 9.9 MG/DL (ref 8.3–10.6)
CHLORIDE SERPL-SCNC: 105 MMOL/L (ref 99–110)
CO2 SERPL-SCNC: 23 MMOL/L (ref 21–32)
CREAT SERPL-MCNC: 0.7 MG/DL (ref 0.6–1.2)
EOSINOPHIL # BLD: 0.18 K/UL
EOSINOPHILS RELATIVE PERCENT: 3 % (ref 0–3)
ERYTHROCYTE [DISTWIDTH] IN BLOOD BY AUTOMATED COUNT: 14.6 % (ref 11.7–14.9)
GFR, ESTIMATED: 85 ML/MIN/1.73M2
GLUCOSE SERPL-MCNC: 134 MG/DL (ref 74–99)
HCT VFR BLD AUTO: 39.6 % (ref 37–47)
HGB BLD-MCNC: 13.3 G/DL (ref 12.5–16)
LYMPHOCYTES NFR BLD: 2.55 K/UL
LYMPHOCYTES RELATIVE PERCENT: 37 % (ref 24–44)
MCH RBC QN AUTO: 28.4 PG (ref 27–31)
MCHC RBC AUTO-ENTMCNC: 33.6 G/DL (ref 32–36)
MCV RBC AUTO: 84.4 FL (ref 78–100)
MONOCYTES NFR BLD: 0.49 K/UL
MONOCYTES NFR BLD: 7 % (ref 0–4)
NEUTROPHILS NFR BLD: 53 % (ref 36–66)
NEUTS SEG NFR BLD: 3.68 K/UL
PLATELET # BLD AUTO: 260 K/UL (ref 140–440)
PMV BLD AUTO: 9.1 FL (ref 7.5–11.1)
POTASSIUM SERPL-SCNC: 4.2 MMOL/L (ref 3.5–5.1)
PROT SERPL-MCNC: 6.9 G/DL (ref 6.4–8.2)
RBC # BLD AUTO: 4.69 M/UL (ref 4.2–5.4)
SODIUM SERPL-SCNC: 141 MMOL/L (ref 136–145)
WBC OTHER # BLD: 6.9 K/UL (ref 4–10.5)

## 2024-11-14 PROCEDURE — 86300 IMMUNOASSAY TUMOR CA 15-3: CPT

## 2024-11-14 PROCEDURE — 85025 COMPLETE CBC W/AUTO DIFF WBC: CPT

## 2024-11-14 PROCEDURE — 80053 COMPREHEN METABOLIC PANEL: CPT

## 2024-11-14 PROCEDURE — 36415 COLL VENOUS BLD VENIPUNCTURE: CPT

## 2024-11-15 LAB
MISCELLANEOUS LAB TEST RESULT: NORMAL
TEST NAME: NORMAL

## 2024-11-21 ENCOUNTER — HOSPITAL ENCOUNTER (OUTPATIENT)
Dept: INFUSION THERAPY | Age: 64
Discharge: HOME | End: 2024-11-21
Payer: MEDICARE

## 2024-11-21 ENCOUNTER — OFFICE VISIT (OUTPATIENT)
Dept: ONCOLOGY | Age: 64
End: 2024-11-21
Payer: MEDICARE

## 2024-11-21 VITALS
RESPIRATION RATE: 16 BRPM | TEMPERATURE: 98 F | OXYGEN SATURATION: 98 % | HEIGHT: 62 IN | DIASTOLIC BLOOD PRESSURE: 72 MMHG | BODY MASS INDEX: 34.01 KG/M2 | SYSTOLIC BLOOD PRESSURE: 146 MMHG | HEART RATE: 99 BPM | WEIGHT: 184.8 LBS

## 2024-11-21 DIAGNOSIS — C50.912 DUCTAL CARCINOMA IN SITU (DCIS) OF LEFT BREAST WITH MICROINVASIVE COMPONENT (HCC): Primary | ICD-10-CM

## 2024-11-21 PROCEDURE — G8427 DOCREV CUR MEDS BY ELIG CLIN: HCPCS | Performed by: INTERNAL MEDICINE

## 2024-11-21 PROCEDURE — 1126F AMNT PAIN NOTED NONE PRSNT: CPT | Performed by: INTERNAL MEDICINE

## 2024-11-21 PROCEDURE — G8417 CALC BMI ABV UP PARAM F/U: HCPCS | Performed by: INTERNAL MEDICINE

## 2024-11-21 PROCEDURE — 1159F MED LIST DOCD IN RCRD: CPT | Performed by: INTERNAL MEDICINE

## 2024-11-21 PROCEDURE — G8484 FLU IMMUNIZE NO ADMIN: HCPCS | Performed by: INTERNAL MEDICINE

## 2024-11-21 PROCEDURE — 3017F COLORECTAL CA SCREEN DOC REV: CPT | Performed by: INTERNAL MEDICINE

## 2024-11-21 PROCEDURE — 99211 OFF/OP EST MAY X REQ PHY/QHP: CPT

## 2024-11-21 PROCEDURE — 1123F ACP DISCUSS/DSCN MKR DOCD: CPT | Performed by: INTERNAL MEDICINE

## 2024-11-21 PROCEDURE — 99214 OFFICE O/P EST MOD 30 MIN: CPT | Performed by: INTERNAL MEDICINE

## 2024-11-21 PROCEDURE — G8399 PT W/DXA RESULTS DOCUMENT: HCPCS | Performed by: INTERNAL MEDICINE

## 2024-11-21 PROCEDURE — 1090F PRES/ABSN URINE INCON ASSESS: CPT | Performed by: INTERNAL MEDICINE

## 2024-11-21 PROCEDURE — 1036F TOBACCO NON-USER: CPT | Performed by: INTERNAL MEDICINE

## 2024-11-21 PROCEDURE — 3077F SYST BP >= 140 MM HG: CPT | Performed by: INTERNAL MEDICINE

## 2024-11-21 PROCEDURE — 3078F DIAST BP <80 MM HG: CPT | Performed by: INTERNAL MEDICINE

## 2024-11-21 NOTE — PROGRESS NOTES
Patient Name:  Adele Ansari  Patient :  1960  Patient MRN:  [id number]     Primary Oncologist: Kiana Gardner MD  Referring Provider: Ro Dwyer APRN - CNP     Date of Service: 2024      Reason for Consult:  Left Breast DCIS      Chief Complaint:    Chief Complaint   Patient presents with    Follow-up       Encounter Diagnosis   Name Primary?    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  Found to have suspicious calcifications in the left breast on routine mammogram.  She did not report any palpable breast mass.  Biopsy revealed estrogen receptor negative progesterone receptor negative, ductal carcinoma in situ with microinvasion.  She then subsequently underwent left mastectomy with sentinel node removal.  Pathology with DCIS, no invasive cancer identified.  1 lymph node was negative.  Grade and Ki-67 could not be evaluated.    No CP, No SOB. No ABD Pain. Reports Left Rib pain. Pain at Left mastectomy site.No new masses. No LAD. No Fevers. No Headaches. No Nausea or Diarrhea. No Constipation or Diarrhea.     23 [facility]  Right Breast Negative. In the lower outer quadrant of the left breast at approx 6-7 o'clock , there are indeterminate calcifications.    23 Left Breast Mammogram  Birads 4-S Stereotactic biopsy recommended of central to slightly lower outer left breast calcifications.    3/3/23 Underwent Left Breast Biopsy         3/15/23 BMP WNL  CBC WBC 10.5, Hemoglobin 14.8, Hematocrit 43.3, MCV 82.5, Platelets 283     23 Underwent Left Total Mastectomy with SN Lymph Node biopsy with Dr Morris        23 [facility]      23 WBC 7.8, HGB 13.9, HCT 41.1, MCV 85.1, Platelets 233, ANC 4100  CMP Creatinine 0.5  CA 27.29: 30.3    2023: mammogram right normal.     23: CBC,CMP and ca 27-29 wnl  Was 35    4/15/2024 MRI of the brain still with 0.6 cm

## 2024-11-21 NOTE — PROGRESS NOTES
MA Rooming Questions  Patient: Adele Ansari  MRN: [id number]    Date: 11/21/2024        1. Do you have any new issues?   yes - discomfort under right arm from reconstruction surgery - surgeon aware. Still having pain in her feet         2. Do you need any refills on medications?    no    3. Have you had any imaging done since your last visit?   yes - [facility]    4. Have you been hospitalized or seen in the emergency room since your last visit here?   no    5. Did the patient have a depression screening completed today? No    No data recorded     PHQ-9 Given to (if applicable):               PHQ-9 Score (if applicable):                     [] Positive     []  Negative              Does question #9 need addressed (if applicable)                     [] Yes    []  No               Carla North MA

## 2024-12-03 ENCOUNTER — HOSPITAL ENCOUNTER (OUTPATIENT)
Dept: SLEEP CENTER | Age: 64
Discharge: HOME | End: 2024-12-03
Payer: MEDICARE

## 2024-12-03 VITALS
HEIGHT: 62 IN | HEART RATE: 79 BPM | DIASTOLIC BLOOD PRESSURE: 79 MMHG | SYSTOLIC BLOOD PRESSURE: 160 MMHG | WEIGHT: 180 LBS | OXYGEN SATURATION: 95 % | BODY MASS INDEX: 33.13 KG/M2

## 2024-12-03 DIAGNOSIS — G47.33 OSA (OBSTRUCTIVE SLEEP APNEA): Primary | ICD-10-CM

## 2024-12-03 PROCEDURE — 99212 OFFICE O/P EST SF 10 MIN: CPT

## 2024-12-03 PROCEDURE — 99205 OFFICE O/P NEW HI 60 MIN: CPT | Performed by: STUDENT IN AN ORGANIZED HEALTH CARE EDUCATION/TRAINING PROGRAM

## 2024-12-03 ASSESSMENT — SLEEP AND FATIGUE QUESTIONNAIRES
HOW LIKELY ARE YOU TO NOD OFF OR FALL ASLEEP WHILE SITTING AND READING: WOULD NEVER DOZE
HOW LIKELY ARE YOU TO NOD OFF OR FALL ASLEEP WHILE WATCHING TV: WOULD NEVER DOZE
HOW LIKELY ARE YOU TO NOD OFF OR FALL ASLEEP WHEN YOU ARE A PASSENGER IN A CAR FOR AN HOUR WITHOUT A BREAK: WOULD NEVER DOZE
HOW LIKELY ARE YOU TO NOD OFF OR FALL ASLEEP WHILE SITTING AND TALKING TO SOMEONE: WOULD NEVER DOZE
HOW LIKELY ARE YOU TO NOD OFF OR FALL ASLEEP WHILE LYING DOWN TO REST IN THE AFTERNOON WHEN CIRCUMSTANCES PERMIT: HIGH CHANCE OF DOZING
HOW LIKELY ARE YOU TO NOD OFF OR FALL ASLEEP WHILE SITTING QUIETLY AFTER LUNCH WITHOUT ALCOHOL: WOULD NEVER DOZE
HOW LIKELY ARE YOU TO NOD OFF OR FALL ASLEEP WHILE SITTING INACTIVE IN A PUBLIC PLACE: WOULD NEVER DOZE
HOW LIKELY ARE YOU TO NOD OFF OR FALL ASLEEP IN A CAR, WHILE STOPPED FOR A FEW MINUTES IN TRAFFIC: WOULD NEVER DOZE
ESS TOTAL SCORE: 3

## 2024-12-03 NOTE — CONSULTS
[facility]      Agapito Pope MD, FACP, [facility]  Kannan Carter MD, [facility]  Anival Solano MD, [facility]  Etta Mcknight DO  Perri Rivera DO      [address]  [address]  [place] [address]   PH: [phone]  F: [phone]     Subjective:     Patient ID: Adele Ansari is a 64 y.o. female, referred to the sleep center for   Chief Complaint   Patient presents with    G47.33   .    Referring Provider:    Ro Dwyer, APRN - CNP  [address] [place] [address]    Summary: Ms. Ansari is seen today to establish with a new sleep medicine provider for her history of JETHRO. She uses her CPAP nightly she says since 2013 but was not following with a sleep doctor for many years. She had repeat sleep study this year with Dr. Gould, prescribed a new CPAP. She has not been happy with follow up through that office and wants to transition to another group. She says her new CPAP settings are very noisy and keeps her awake. After she does finally fall asleep, she says the pressure ramping up wakes her again.  She says for years it was set at 7 and she slept well.     She had mastectomy and is not able to tolerate sleeping on her back comfortably.     Her CPAP isn't transmitting her data.     Has nasal mask. Nasal pillows     Symptoms:   [x]  Snoring      []  Dry Mouth  []  Choking                                                                []  Morning Headaches  []  Gasping for Air                                                     []  Trouble Falling asleep  []  Tired during the daytime                                      []  Trouble Staying Asleep  []  Tired when you wake up                                      []  Weight Gain in Last 5 Years  []  Wake up frequently at night                                 []  Weight Loss in Last 5 Years  []  Shortness Of Breath                                           []  Shift

## 2025-02-10 ENCOUNTER — HOSPITAL ENCOUNTER (OUTPATIENT)
Dept: SLEEP CENTER | Age: 65
Discharge: HOME | End: 2025-02-10
Payer: MEDICARE

## 2025-02-10 DIAGNOSIS — G47.33 OSA (OBSTRUCTIVE SLEEP APNEA): ICD-10-CM

## 2025-02-10 PROCEDURE — 95810 POLYSOM 6/> YRS 4/> PARAM: CPT

## 2025-02-21 ENCOUNTER — HOSPITAL ENCOUNTER (OUTPATIENT)
Dept: INFUSION THERAPY | Age: 65
Discharge: HOME | End: 2025-02-21
Payer: MEDICARE

## 2025-02-21 DIAGNOSIS — C50.912 DUCTAL CARCINOMA IN SITU (DCIS) OF LEFT BREAST WITH MICROINVASIVE COMPONENT (HCC): ICD-10-CM

## 2025-02-21 LAB
ALBUMIN SERPL-MCNC: 4.6 G/DL (ref 3.4–5)
ALBUMIN/GLOB SERPL: 1.7 {RATIO} (ref 1.1–2.2)
ALP SERPL-CCNC: 71 U/L (ref 40–129)
ALT SERPL-CCNC: 23 U/L (ref 10–40)
ANION GAP SERPL CALCULATED.3IONS-SCNC: 12 MMOL/L (ref 9–17)
AST SERPL-CCNC: 22 U/L (ref 15–37)
BASOPHILS # BLD: 0.01 K/UL
BASOPHILS NFR BLD: 0 % (ref 0–1)
BILIRUB SERPL-MCNC: 0.3 MG/DL (ref 0–1)
BUN SERPL-MCNC: 15 MG/DL (ref 7–20)
CALCIUM SERPL-MCNC: 10.8 MG/DL (ref 8.3–10.6)
CHLORIDE SERPL-SCNC: 102 MMOL/L (ref 99–110)
CO2 SERPL-SCNC: 29 MMOL/L (ref 21–32)
CREAT SERPL-MCNC: 0.7 MG/DL (ref 0.6–1.2)
EOSINOPHIL # BLD: 0.16 K/UL
EOSINOPHILS RELATIVE PERCENT: 2 % (ref 0–3)
ERYTHROCYTE [DISTWIDTH] IN BLOOD BY AUTOMATED COUNT: 14.7 % (ref 11.7–14.9)
GFR, ESTIMATED: 83 ML/MIN/1.73M2
GLUCOSE SERPL-MCNC: 130 MG/DL (ref 74–99)
HCT VFR BLD AUTO: 42.2 % (ref 37–47)
HGB BLD-MCNC: 13.8 G/DL (ref 12.5–16)
LYMPHOCYTES NFR BLD: 2.79 K/UL
LYMPHOCYTES RELATIVE PERCENT: 31 % (ref 24–44)
MCH RBC QN AUTO: 28.3 PG (ref 27–31)
MCHC RBC AUTO-ENTMCNC: 32.7 G/DL (ref 32–36)
MCV RBC AUTO: 86.7 FL (ref 78–100)
MONOCYTES NFR BLD: 0.65 K/UL
MONOCYTES NFR BLD: 7 % (ref 0–4)
NEUTROPHILS NFR BLD: 59 % (ref 36–66)
NEUTS SEG NFR BLD: 5.27 K/UL
PLATELET # BLD AUTO: 287 K/UL (ref 140–440)
PMV BLD AUTO: 9.7 FL (ref 7.5–11.1)
POTASSIUM SERPL-SCNC: 3.7 MMOL/L (ref 3.5–5.1)
PROT SERPL-MCNC: 7.2 G/DL (ref 6.4–8.2)
RBC # BLD AUTO: 4.87 M/UL (ref 4.2–5.4)
SODIUM SERPL-SCNC: 143 MMOL/L (ref 136–145)
WBC OTHER # BLD: 8.9 K/UL (ref 4–10.5)

## 2025-02-21 PROCEDURE — 36415 COLL VENOUS BLD VENIPUNCTURE: CPT

## 2025-02-21 PROCEDURE — 85025 COMPLETE CBC W/AUTO DIFF WBC: CPT

## 2025-02-21 PROCEDURE — 80053 COMPREHEN METABOLIC PANEL: CPT

## 2025-02-21 PROCEDURE — 86300 IMMUNOASSAY TUMOR CA 15-3: CPT

## 2025-02-26 LAB
MISCELLANEOUS LAB TEST RESULT: NORMAL
TEST NAME: NORMAL

## 2025-02-28 ENCOUNTER — OFFICE VISIT (OUTPATIENT)
Dept: ONCOLOGY | Age: 65
End: 2025-02-28
Payer: MEDICARE

## 2025-02-28 ENCOUNTER — HOSPITAL ENCOUNTER (OUTPATIENT)
Dept: INFUSION THERAPY | Age: 65
Discharge: HOME | End: 2025-02-28
Payer: MEDICARE

## 2025-02-28 VITALS
SYSTOLIC BLOOD PRESSURE: 151 MMHG | DIASTOLIC BLOOD PRESSURE: 83 MMHG | TEMPERATURE: 98 F | HEART RATE: 65 BPM | OXYGEN SATURATION: 99 % | RESPIRATION RATE: 16 BRPM | BODY MASS INDEX: 33.68 KG/M2 | WEIGHT: 183 LBS | HEIGHT: 62 IN

## 2025-02-28 DIAGNOSIS — C50.912 DUCTAL CARCINOMA IN SITU (DCIS) OF LEFT BREAST WITH MICROINVASIVE COMPONENT (HCC): Primary | ICD-10-CM

## 2025-02-28 PROCEDURE — 1090F PRES/ABSN URINE INCON ASSESS: CPT | Performed by: INTERNAL MEDICINE

## 2025-02-28 PROCEDURE — 1123F ACP DISCUSS/DSCN MKR DOCD: CPT | Performed by: INTERNAL MEDICINE

## 2025-02-28 PROCEDURE — 1036F TOBACCO NON-USER: CPT | Performed by: INTERNAL MEDICINE

## 2025-02-28 PROCEDURE — G8427 DOCREV CUR MEDS BY ELIG CLIN: HCPCS | Performed by: INTERNAL MEDICINE

## 2025-02-28 PROCEDURE — 3017F COLORECTAL CA SCREEN DOC REV: CPT | Performed by: INTERNAL MEDICINE

## 2025-02-28 PROCEDURE — G8417 CALC BMI ABV UP PARAM F/U: HCPCS | Performed by: INTERNAL MEDICINE

## 2025-02-28 PROCEDURE — 99214 OFFICE O/P EST MOD 30 MIN: CPT | Performed by: INTERNAL MEDICINE

## 2025-02-28 PROCEDURE — 3079F DIAST BP 80-89 MM HG: CPT | Performed by: INTERNAL MEDICINE

## 2025-02-28 PROCEDURE — 99212 OFFICE O/P EST SF 10 MIN: CPT

## 2025-02-28 PROCEDURE — 1159F MED LIST DOCD IN RCRD: CPT | Performed by: INTERNAL MEDICINE

## 2025-02-28 PROCEDURE — 3077F SYST BP >= 140 MM HG: CPT | Performed by: INTERNAL MEDICINE

## 2025-02-28 PROCEDURE — G8399 PT W/DXA RESULTS DOCUMENT: HCPCS | Performed by: INTERNAL MEDICINE

## 2025-02-28 PROCEDURE — 1126F AMNT PAIN NOTED NONE PRSNT: CPT | Performed by: INTERNAL MEDICINE

## 2025-02-28 RX ORDER — LATANOPROST 50 UG/ML
SOLUTION/ DROPS OPHTHALMIC
COMMUNITY
Start: 2025-02-27

## 2025-02-28 RX ORDER — TIRZEPATIDE 2.5 MG/.5ML
2.5 INJECTION, SOLUTION SUBCUTANEOUS
COMMUNITY
Start: 2025-02-19

## 2025-02-28 RX ORDER — DILTIAZEM HYDROCHLORIDE 300 MG/1
CAPSULE, EXTENDED RELEASE ORAL
COMMUNITY
Start: 2025-02-24 | End: 2025-02-28

## 2025-02-28 ASSESSMENT — PATIENT HEALTH QUESTIONNAIRE - PHQ9
SUM OF ALL RESPONSES TO PHQ QUESTIONS 1-9: 0
1. LITTLE INTEREST OR PLEASURE IN DOING THINGS: NOT AT ALL
2. FEELING DOWN, DEPRESSED OR HOPELESS: NOT AT ALL
SUM OF ALL RESPONSES TO PHQ QUESTIONS 1-9: 0
SUM OF ALL RESPONSES TO PHQ9 QUESTIONS 1 & 2: 0

## 2025-02-28 NOTE — PROGRESS NOTES
MA Rooming Questions  Patient: Adele Ansari  MRN: [id number]    Date: 2/28/2025        1. Do you have any new issues?   no         2. Do you need any refills on medications?    no    3. Have you had any imaging done since your last visit?   no    4. Have you been hospitalized or seen in the emergency room since your last visit here?   no    5. Did the patient have a depression screening completed today? Yes    No data recorded     PHQ-9 Given to (if applicable):               PHQ-9 Score (if applicable):                     [] Positive     []  Negative              Does question #9 need addressed (if applicable)                     [] Yes    []  No               Nena Frias CMA

## 2025-02-28 NOTE — PROGRESS NOTES
Patient Name:  Adele Ansari  Patient :  1960  Patient MRN:  [id number]     Primary Oncologist: Kiana Gardner MD  Referring Provider: Ro Dwyer APRN - CNP     Date of Service: 2025      Reason for Consult:  Left Breast DCIS      Chief Complaint:    Chief Complaint   Patient presents with    Follow-up       Encounter Diagnosis   Name Primary?    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  Found to have suspicious calcifications in the left breast on routine mammogram.  She did not report any palpable breast mass.  Biopsy revealed estrogen receptor negative progesterone receptor negative, ductal carcinoma in situ with microinvasion.  She then subsequently underwent left mastectomy with sentinel node removal.  Pathology with DCIS, no invasive cancer identified.  1 lymph node was negative.  Grade and Ki-67 could not be evaluated.    No CP, No SOB. No ABD Pain. Reports Left Rib pain. Pain at Left mastectomy site.No new masses. No LAD. No Fevers. No Headaches. No Nausea or Diarrhea. No Constipation or Diarrhea.     23 [facility]  Right Breast Negative. In the lower outer quadrant of the left breast at approx 6-7 o'clock , there are indeterminate calcifications.    23 Left Breast Mammogram  Birads 4-S Stereotactic biopsy recommended of central to slightly lower outer left breast calcifications.    3/3/23 Underwent Left Breast Biopsy         3/15/23 BMP WNL  CBC WBC 10.5, Hemoglobin 14.8, Hematocrit 43.3, MCV 82.5, Platelets 283     23 Underwent Left Total Mastectomy with SN Lymph Node biopsy with Dr Morris        23 [facility]      23 WBC 7.8, HGB 13.9, HCT 41.1, MCV 85.1, Platelets 233, ANC 4100  CMP Creatinine 0.5  CA 27.29: 30.3    2023: mammogram right normal.     23: CBC,CMP and ca 27-29 wnl  Was 35    4/15/2024 MRI of the brain still with 0.6 cm

## 2025-03-25 ENCOUNTER — HOSPITAL ENCOUNTER (OUTPATIENT)
Dept: SLEEP CENTER | Age: 65
Discharge: HOME | End: 2025-03-25
Payer: MEDICARE

## 2025-03-25 DIAGNOSIS — G47.33 OSA (OBSTRUCTIVE SLEEP APNEA): Primary | ICD-10-CM

## 2025-03-25 PROCEDURE — 99215 OFFICE O/P EST HI 40 MIN: CPT | Performed by: STUDENT IN AN ORGANIZED HEALTH CARE EDUCATION/TRAINING PROGRAM

## 2025-03-25 PROCEDURE — 99212 OFFICE O/P EST SF 10 MIN: CPT

## 2025-03-26 NOTE — PROGRESS NOTES
All information and orders sent to [facility] for Oral Appliance.  
Escitalopram oxalate      Lopid [gemfibrozil]      Lopressor [metoprolol tartrate] Itching 03/14/2012    Paxil [paroxetine]      Phenol      Relafen [nabumetone]      Trimethoprim  04/05/2023    Zetia [ezetimibe]      Zyrtec [cetirizine hcl]      Other Diarrhea 01/02/2018       Patient Active Problem List   Diagnosis    Essential hypertension    Mixed hyperlipidemia    PSVT (paroxysmal supraventricular tachycardia)    Raynaud disease    Vitamin D deficiency    JETHRO (obstructive sleep apnea)    Gout of multiple sites    Hordeolum externum of left upper eyelid    Acute recurrent maxillary sinusitis    Chronic pain of both feet    GERD (gastroesophageal reflux disease)    Hypertriglyceridemia    Prediabetes    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)    Obesity (BMI 30.0-34.9)       Past Medical History:   Diagnosis Date    Acute pain after mastectomy 2024    Anxiety     Arthritis     Cancer (HCC)     Depression     Fatty liver     by US    Fibromyalgia     GERD (gastroesophageal reflux disease)     by UGI    Gout     Hyperlipidemia     Hypertension     Hypoglycemia     IBS (irritable bowel syndrome)     IBS (irritable bowel syndrome)     Prolonged emergence from general anesthesia     PSVT (paroxysmal supraventricular tachycardia)     s/p ablation       Past Surgical History:   Procedure Laterality Date    ABDOMEN SURGERY       Cholecystectomy    ABDOMEN SURGERY      Rt ovarian adhesions removed and cervical laser therapy/Dr Joel    ABDOMEN SURGERY      Rt ovarian cyst removed    ABLATION OF DYSRHYTHMIC FOCUS      APPENDECTOMY      BONE CYST EXCISION  1980    Left bone cyst aneurysm removed    CHOLECYSTECTOMY      COLONOSCOPY  01/19/2016    diverticulosis    FOOT NEUROMA SURGERY Right     MASTECTOMY      TONSILLECTOMY         Family History   Problem Relation Age of Onset    Arthritis Father     High Blood Pressure Father     High Cholesterol Father     Kidney Disease Father

## 2025-05-27 ENCOUNTER — HOSPITAL ENCOUNTER (OUTPATIENT)
Dept: SLEEP CENTER | Age: 65
Discharge: HOME | End: 2025-05-27
Payer: MEDICARE

## 2025-05-27 DIAGNOSIS — G47.33 OSA (OBSTRUCTIVE SLEEP APNEA): Primary | ICD-10-CM

## 2025-05-27 PROCEDURE — 99215 OFFICE O/P EST HI 40 MIN: CPT | Performed by: STUDENT IN AN ORGANIZED HEALTH CARE EDUCATION/TRAINING PROGRAM

## 2025-05-27 PROCEDURE — 99211 OFF/OP EST MAY X REQ PHY/QHP: CPT

## 2025-05-27 RX ORDER — EVOLOCUMAB 140 MG/ML
INJECTION, SOLUTION SUBCUTANEOUS
COMMUNITY
Start: 2025-04-30

## 2025-05-27 ASSESSMENT — SLEEP AND FATIGUE QUESTIONNAIRES
HOW LIKELY ARE YOU TO NOD OFF OR FALL ASLEEP WHILE SITTING QUIETLY AFTER LUNCH WITHOUT ALCOHOL: WOULD NEVER DOZE
ESS TOTAL SCORE: 3
HOW LIKELY ARE YOU TO NOD OFF OR FALL ASLEEP IN A CAR, WHILE STOPPED FOR A FEW MINUTES IN TRAFFIC: WOULD NEVER DOZE
HOW LIKELY ARE YOU TO NOD OFF OR FALL ASLEEP WHILE LYING DOWN TO REST IN THE AFTERNOON WHEN CIRCUMSTANCES PERMIT: HIGH CHANCE OF DOZING
HOW LIKELY ARE YOU TO NOD OFF OR FALL ASLEEP WHILE SITTING INACTIVE IN A PUBLIC PLACE: WOULD NEVER DOZE
HOW LIKELY ARE YOU TO NOD OFF OR FALL ASLEEP WHILE SITTING AND READING: WOULD NEVER DOZE
HOW LIKELY ARE YOU TO NOD OFF OR FALL ASLEEP WHILE SITTING AND TALKING TO SOMEONE: WOULD NEVER DOZE
HOW LIKELY ARE YOU TO NOD OFF OR FALL ASLEEP WHEN YOU ARE A PASSENGER IN A CAR FOR AN HOUR WITHOUT A BREAK: WOULD NEVER DOZE
HOW LIKELY ARE YOU TO NOD OFF OR FALL ASLEEP WHILE WATCHING TV: WOULD NEVER DOZE

## 2025-05-27 NOTE — PROGRESS NOTES
[facility]      Agapito Pope MD, FACP, [facility]  Kannan Carter MD, [facility]  Anival Solano MD, [facility]  Etta Mcknight DO  Perri Rivera DO      [address]  [address]  [place] [address]   PH: [phone]  F: [phone]     Subjective:     Patient ID: Adele Ansari is a 64 y.o. female, following up today with the sleep center.     Reason for Follow Up/Chief Complaint: No chief complaint on file.      Results:    History: Ms. Ansari was initiallly seen today to establish with a new sleep medicine provider for her history of JETHRO. She uses her CPAP nightly she says since 2013 but was not following with a sleep doctor for many years. She had mastectomy and is not able to tolerate sleeping on her back comfortably. Has nasal mask. Nasal pillows     SHe underwent PSG with split titration for suspected JETHRO on 2/10/25 demonstrating mild JETHRO (13.3). She had some improved compliance with CPAP but still opted to get a mouthguard to try instead.     She started using her dental appliance 5/19/25 and reports feeling just as well-rested during the day as she had been when using the CPAP, but has added benefit of feeling she is getting into deeper more restful sleep at night, and she does prefer it.  No other changes in health since last visit.     Provider notes reviewed 5/20/25, 3/18/25, 2/28/25    Social History     Socioeconomic History    Marital status:      Spouse name: Not on file    Number of children: Not on file    Years of education: Not on file    Highest education level: Not on file   Occupational History    Not on file   Tobacco Use    Smoking status: Never     Passive exposure: Past    Smokeless tobacco: Never   Vaping Use    Vaping status: Never Used   Substance and Sexual Activity    Alcohol use: Yes     Comment: occasionally    Drug use: No    Sexual activity: Not on file   Other Topics Concern    Not on file   Social History Narrative    Not on file     Social

## 2025-05-29 ENCOUNTER — HOSPITAL ENCOUNTER (OUTPATIENT)
Dept: SLEEP CENTER | Age: 65
Discharge: HOME | End: 2025-05-29
Payer: MEDICARE

## 2025-05-29 DIAGNOSIS — G47.33 OSA (OBSTRUCTIVE SLEEP APNEA): ICD-10-CM

## 2025-05-29 PROCEDURE — G0399 HOME SLEEP TEST/TYPE 3 PORTA: HCPCS

## 2025-05-29 NOTE — PROGRESS NOTES
Adele Ansari  1960  arrived at Sleep Center on 5/29/2025 for set up and instruction of home sleep study with the [facility].  she was instructed on proper set-up and operation of HST unit. Written instructions with set up diagram given for reference and reinforcement of verbal instruction and demonstration. she was able to return demonstration appropriately. No home environment, vision, dexterity, comprehension concerns with this patient based on completed forms and patient interactions. Patient will return unit after one night as instructed.    Electronically signed by Marcial Carranza on 5/29/2025 at 1:43 PM

## 2025-06-03 ENCOUNTER — HOSPITAL ENCOUNTER (OUTPATIENT)
Dept: SLEEP CENTER | Age: 65
Discharge: HOME | End: 2025-06-03
Payer: MEDICARE

## 2025-06-03 PROCEDURE — 99211 OFF/OP EST MAY X REQ PHY/QHP: CPT

## 2025-06-03 PROCEDURE — 99213 OFFICE O/P EST LOW 20 MIN: CPT | Performed by: PSYCHIATRY & NEUROLOGY

## 2025-06-03 NOTE — PROGRESS NOTES
[facility]      Agapito Pope MD, FACP, [facility]  Kannan Carter MD, [facility]  Anival Solano MD, [facility]  Etta Mcknight DO  Perri Rivera DO      [address]  [address]  [place] [address]   PH: [phone]  F: [phone]     Subjective:     Patient ID: Adele Ansari is a 64 y.o. female, following up today with the sleep center.     Reason for Follow Up/Chief Complaint:   Chief Complaint   Patient presents with    Sleep Apnea    2 Week Follow-Up       Results:    History: Mrs. Ansari was initiallly seen today to establish with a new sleep medicine provider for her history of JETHRO.     She had a CPAP but she just couldn't tolerate it any more. She did get an oral appliance and feels she is sleeping better with it. She is now dreaming and sleeping more soundly.     When she did her sleep study, it was the worst night of her life. She couldn't sleep, the alarm on the sleep study kept going off, her 's CPAP kept making noise. She finally slept at about 130 in the am but she buried her face in the pillow which isn't how she normally sleeps. She does not want to do another sleep study.       Provider notes reviewed 5/27/25, 5/20/25, 3/18/25, 2/28/25    Social History     Socioeconomic History    Marital status:      Spouse name: Not on file    Number of children: Not on file    Years of education: Not on file    Highest education level: Not on file   Occupational History    Not on file   Tobacco Use    Smoking status: Never     Passive exposure: Past    Smokeless tobacco: Never   Vaping Use    Vaping status: Never Used   Substance and Sexual Activity    Alcohol use: Yes     Comment: occasionally    Drug use: No    Sexual activity: Not on file   Other Topics Concern    Not on file   Social History Narrative    Not on file     Social Drivers of Health     Financial Resource Strain: Low Risk  (5/20/2025)    Received from [employer]    [employer]

## 2025-07-30 ENCOUNTER — TELEPHONE (OUTPATIENT)
Dept: SLEEP CENTER | Age: 65
End: 2025-07-30

## 2025-07-30 NOTE — TELEPHONE ENCOUNTER
I called and Spoke to Adele to try and schedule a follow up with the  After receiving her Oral appliance however she refused saying she's already done all this and the  Told her she was good to go and she's not able to talk right now and told me to \"do my research\" and she hung up.

## 2025-08-18 ENCOUNTER — HOSPITAL ENCOUNTER (OUTPATIENT)
Dept: INFUSION THERAPY | Age: 65
Discharge: HOME | End: 2025-08-18
Payer: MEDICARE

## 2025-08-18 DIAGNOSIS — C50.912 DUCTAL CARCINOMA IN SITU (DCIS) OF LEFT BREAST WITH MICROINVASIVE COMPONENT (HCC): ICD-10-CM

## 2025-08-18 LAB
ALBUMIN SERPL-MCNC: 4.4 G/DL (ref 3.4–5)
ALBUMIN/GLOB SERPL: 1.6 {RATIO} (ref 1.1–2.2)
ALP SERPL-CCNC: 72 U/L (ref 40–129)
ALT SERPL-CCNC: 20 U/L (ref 10–40)
ANION GAP SERPL CALCULATED.3IONS-SCNC: 11 MMOL/L (ref 9–17)
AST SERPL-CCNC: 25 U/L (ref 15–37)
BASOPHILS # BLD: 0.02 K/UL
BASOPHILS NFR BLD: 0 % (ref 0–1)
BILIRUB SERPL-MCNC: 0.4 MG/DL (ref 0–1)
BUN SERPL-MCNC: 14 MG/DL (ref 7–20)
CALCIUM SERPL-MCNC: 9.7 MG/DL (ref 8.3–10.6)
CHLORIDE SERPL-SCNC: 106 MMOL/L (ref 99–110)
CO2 SERPL-SCNC: 21 MMOL/L (ref 21–32)
CREAT SERPL-MCNC: 0.6 MG/DL (ref 0.6–1.2)
EOSINOPHIL # BLD: 0.17 K/UL
EOSINOPHILS RELATIVE PERCENT: 2 % (ref 0–3)
ERYTHROCYTE [DISTWIDTH] IN BLOOD BY AUTOMATED COUNT: 15 % (ref 11.7–14.9)
GFR, ESTIMATED: >90 ML/MIN/1.73M2
GLUCOSE SERPL-MCNC: 106 MG/DL (ref 74–99)
HCT VFR BLD AUTO: 42 % (ref 37–47)
HGB BLD-MCNC: 13.9 G/DL (ref 12.5–16)
LYMPHOCYTES NFR BLD: 2.83 K/UL
LYMPHOCYTES RELATIVE PERCENT: 38 % (ref 24–44)
MCH RBC QN AUTO: 28.7 PG (ref 27–31)
MCHC RBC AUTO-ENTMCNC: 33.1 G/DL (ref 32–36)
MCV RBC AUTO: 86.8 FL (ref 78–100)
MONOCYTES NFR BLD: 0.66 K/UL
MONOCYTES NFR BLD: 9 % (ref 0–5)
NEUTROPHILS NFR BLD: 51 % (ref 36–66)
NEUTS SEG NFR BLD: 3.85 K/UL
PLATELET # BLD AUTO: 267 K/UL (ref 140–440)
PMV BLD AUTO: 10.2 FL (ref 7.5–11.1)
POTASSIUM SERPL-SCNC: 4.1 MMOL/L (ref 3.5–5.1)
PROT SERPL-MCNC: 7.1 G/DL (ref 6.4–8.2)
RBC # BLD AUTO: 4.84 M/UL (ref 4.2–5.4)
SODIUM SERPL-SCNC: 138 MMOL/L (ref 136–145)
WBC OTHER # BLD: 7.5 K/UL (ref 4–10.5)

## 2025-08-18 PROCEDURE — 36415 COLL VENOUS BLD VENIPUNCTURE: CPT

## 2025-08-18 PROCEDURE — 80053 COMPREHEN METABOLIC PANEL: CPT

## 2025-08-18 PROCEDURE — 85025 COMPLETE CBC W/AUTO DIFF WBC: CPT

## 2025-08-18 PROCEDURE — 86300 IMMUNOASSAY TUMOR CA 15-3: CPT

## 2025-08-24 LAB
MISCELLANEOUS LAB TEST RESULT: NORMAL
TEST NAME: NORMAL

## 2025-08-25 ENCOUNTER — OFFICE VISIT (OUTPATIENT)
Dept: ONCOLOGY | Age: 65
End: 2025-08-25
Payer: MEDICARE

## 2025-08-25 ENCOUNTER — HOSPITAL ENCOUNTER (OUTPATIENT)
Dept: INFUSION THERAPY | Age: 65
Discharge: HOME | End: 2025-08-25
Payer: MEDICARE

## 2025-08-25 VITALS
BODY MASS INDEX: 34.78 KG/M2 | WEIGHT: 189 LBS | HEART RATE: 88 BPM | TEMPERATURE: 97.3 F | SYSTOLIC BLOOD PRESSURE: 169 MMHG | HEIGHT: 62 IN | DIASTOLIC BLOOD PRESSURE: 91 MMHG | OXYGEN SATURATION: 96 %

## 2025-08-25 DIAGNOSIS — C50.912 DUCTAL CARCINOMA IN SITU (DCIS) OF LEFT BREAST WITH MICROINVASIVE COMPONENT (HCC): Primary | ICD-10-CM

## 2025-08-25 PROCEDURE — 3077F SYST BP >= 140 MM HG: CPT | Performed by: INTERNAL MEDICINE

## 2025-08-25 PROCEDURE — G8417 CALC BMI ABV UP PARAM F/U: HCPCS | Performed by: INTERNAL MEDICINE

## 2025-08-25 PROCEDURE — 3080F DIAST BP >= 90 MM HG: CPT | Performed by: INTERNAL MEDICINE

## 2025-08-25 PROCEDURE — G8399 PT W/DXA RESULTS DOCUMENT: HCPCS | Performed by: INTERNAL MEDICINE

## 2025-08-25 PROCEDURE — 1125F AMNT PAIN NOTED PAIN PRSNT: CPT | Performed by: INTERNAL MEDICINE

## 2025-08-25 PROCEDURE — 3017F COLORECTAL CA SCREEN DOC REV: CPT | Performed by: INTERNAL MEDICINE

## 2025-08-25 PROCEDURE — 1159F MED LIST DOCD IN RCRD: CPT | Performed by: INTERNAL MEDICINE

## 2025-08-25 PROCEDURE — 1090F PRES/ABSN URINE INCON ASSESS: CPT | Performed by: INTERNAL MEDICINE

## 2025-08-25 PROCEDURE — 99214 OFFICE O/P EST MOD 30 MIN: CPT | Performed by: INTERNAL MEDICINE

## 2025-08-25 PROCEDURE — 1123F ACP DISCUSS/DSCN MKR DOCD: CPT | Performed by: INTERNAL MEDICINE

## 2025-08-25 PROCEDURE — 1036F TOBACCO NON-USER: CPT | Performed by: INTERNAL MEDICINE

## 2025-08-25 PROCEDURE — G8427 DOCREV CUR MEDS BY ELIG CLIN: HCPCS | Performed by: INTERNAL MEDICINE

## 2025-08-25 PROCEDURE — 99212 OFFICE O/P EST SF 10 MIN: CPT

## 2025-08-28 RX ORDER — ALLOPURINOL 100 MG/1
100 TABLET ORAL DAILY
COMMUNITY

## 2025-08-28 RX ORDER — EZETIMIBE 10 MG/1
10 TABLET ORAL DAILY
COMMUNITY

## 2025-08-28 RX ORDER — DILTIAZEM HYDROCHLORIDE 120 MG/1
120 TABLET, FILM COATED ORAL 4 TIMES DAILY
COMMUNITY